# Patient Record
Sex: MALE | Race: WHITE
[De-identification: names, ages, dates, MRNs, and addresses within clinical notes are randomized per-mention and may not be internally consistent; named-entity substitution may affect disease eponyms.]

---

## 2017-04-27 ENCOUNTER — HOSPITAL ENCOUNTER (OUTPATIENT)
Dept: HOSPITAL 61 - ECHO | Age: 82
Discharge: HOME | End: 2017-04-27
Attending: INTERNAL MEDICINE
Payer: MEDICARE

## 2017-04-27 DIAGNOSIS — I08.3: Primary | ICD-10-CM

## 2017-04-27 PROCEDURE — 93306 TTE W/DOPPLER COMPLETE: CPT

## 2017-04-27 NOTE — CARD
--------------- APPROVED REPORT --------------





EXAM: Two-dimensional and M-mode echocardiogram with Doppler and color Doppler.



Other Information 

Quality : Average

Rhythm : Pacemaker



INDICATION

Dyspnea 

Fatigue 

Cardiomyopathy 



2D DIMENSIONS 

RVDd2.6 (2.9-3.5cm)Left Atrium(2D)3.1 (1.6-4.0cm)

IVSd1.2 (0.7-1.1cm)Aortic Root(2D)3.4 (2.0-3.7cm)

LVDd5.4 (3.9-5.9cm)LVOT Diameter2.1 (1.8-2.4cm)

PWd1.2 (0.7-1.1cm)LVDs4.9 (2.5-4.0cm)

SV28.5 mlLVEF(%)38.0 (>50%)



Aortic Valve

AoV Peak Karsten.98.6cm/sAoV VTI21.0cm

AO Peak GR.3.9mmHgLVOT Peak Karsten.76.4cm/s

AO Mean GR.2mmHgAVA (VMAX)2.63cm2

AI P 1/2 [id number]



Mitral Valve

MV E Oafbiari52.7cm/sMV DECEL [id number]

MV A Tnoxcsdr99.6cm/sMV [id number]

E/A  Ratio1.1MV A [id number]

MVA (PHT)4.36cm2



Tricuspid Valve

TR P. Sipjvwar417qd/sRAP [id number]

TR Peak Gr.37ceIjYQAM15ztAn



 LEFT VENTRICLE 

The left ventricle is normal size. There is borderline concentric left ventricular hypertrophy. Left 
ventricle systolic function is moderately impaired. The Ejection Fraction is 38%. There is global hyp
okinesis of the left ventricle more moderate in the basal inferior wall. The left ventricular diastol
ic function and filling is normal for age.



 RIGHT VENTRICLE 

The right ventricle is normal size. The right ventricular systolic function is normal. There is a pac
emaker/ICD lead in the RV/RA.



 ATRIA 

The left atrium size is normal. The right atrium size is normal. The interatrial septum is intact wit
h no evidence for an atrial septal defect or patent foramen ovale as noted on 2-D or Doppler imaging.




 AORTIC VALVE 

The aortic valve is calcified but opens well. The aortic valve is trileaflet. Doppler and Color Flow 
revealed mild aortic regurgitation. There is no significant aortic valvular stenosis.



 MITRAL VALVE 

Mitral annular calcification is mild to moderate. There is no mitral valve stenosis. Doppler and [place]
r Flow revealed mild mitral regurgitation.



 TRICUSPID VALVE 

The tricuspid valve is normal in structure Doppler and Color Flow revealed mild tricuspid regurgitati
on. The PA pressure was estimated at 26 mmHg. There is no tricuspid valve stenosis.



 PULMONIC VALVE 

The pulmonic valve is not well visualized. Doppler and Color Flow revealed mild pulmonic valvular reg
urgitation. There is no pulmonic valvular stenosis.



 GREAT VESSELS 

The aortic root is normal in size. The IVC is normal in size and collapses >50% with inspiration.



 PERICARDIAL EFFUSION 

There is no evidence of significant pericardial effusion.



Critical Notification

Critical Value: No



<Conclusion>

Left ventricle systolic function is moderately impaired.

The Ejection Fraction is 38%.

There is global hypokinesis of the left ventricle more moderate in the basal inferior wall.

There is borderline concentric left ventricular hypertrophy.

The left atrium size is normal.

The right atrium size is normal.

The aortic valve is calcified but opens well.

The aortic valve is trileaflet.

Doppler and Color Flow revealed  mild aortic regurgitation.

Mitral annular calcification is mild to moderate.

Doppler and Color Flow revealed mild mitral regurgitation.

Doppler and Color Flow revealed mild tricuspid regurgitation.

The PA pressure was estimated at 26 mmHg.

Doppler and Color Flow revealed mild pulmonic valvular regurgitation.

There is no evidence of significant pericardial effusion.

## 2017-10-27 ENCOUNTER — HOSPITAL ENCOUNTER (OUTPATIENT)
Dept: HOSPITAL 61 - ECHO | Age: 82
Discharge: HOME | End: 2017-10-27
Attending: INTERNAL MEDICINE
Payer: MEDICARE

## 2017-10-27 DIAGNOSIS — I42.9: ICD-10-CM

## 2017-10-27 DIAGNOSIS — I25.5: ICD-10-CM

## 2017-10-27 DIAGNOSIS — R01.1: ICD-10-CM

## 2017-10-27 DIAGNOSIS — I08.3: Primary | ICD-10-CM

## 2017-10-27 PROCEDURE — 93306 TTE W/DOPPLER COMPLETE: CPT

## 2017-10-27 NOTE — CARD
--------------- APPROVED REPORT --------------





EXAM: Two-dimensional and M-mode echocardiogram with Doppler and color Doppler.



Other Information 

Quality : Good



INDICATION

Cardiomyopathy 

Murmur 



2D DIMENSIONS 

Left Atrium(2D)3.2 (1.6-4.0cm)IVSd1.3 (0.7-1.1cm)

Aortic Root(2D)4.0 (2.0-3.7cm)LVDd4.5 (3.9-5.9cm)

LVOT Diameter2.2 (1.8-2.4cm)PWd1.2 (0.7-1.1cm)

LVDs3.1 (2.5-4.0cm)FS (%) 30.3 %

SV54.0 mlLVEF(%)58.0 (>50%)



Aortic Valve

AoV Peak Kartsen.112.9cm/sAoV VTI24.9cm

AO Peak GR.5.1mmHgLVOT Peak Karsten.64.1cm/s

AO Mean GR.3mmHgAVA (VMAX)2.15cm2

JULIO   (VTI)2.53cs6ID P 1/2 [id number]



Mitral Valve

MV E Dmpliuru19.5cm/sMV DECEL [id number]

MV A Zgkhyvxp69.9cm/sE/A  Ratio0.5



Tricuspid Valve

TR P. Qhjivijg019jg/sRAP [id number]

TR Peak Gr.12ogGwJJTB36xcJw



 LEFT VENTRICLE 

The left ventricle is normal size. There is mild concentric left ventricular hypertrophy. Left ventri
may systolic function is low normal. The estimated Ejection Fraction is 58%. There is mild to moderat
e hypokinesis in the mid septal wall. Septal motion consistent with post-operative state. Transmitral
 Doppler flow pattern is Grade I-abnormal relaxation pattern. 



 RIGHT VENTRICLE 

The right ventricle is normal size. The right ventricular systolic function is normal. Defibulator wi
re seen in right ventricle and right atria.



 ATRIA 

The left atrium size is normal. The right atrium size is normal. The interatrial septum is intact wit
h no evidence for an atrial septal defect or patent foramen ovale as noted on 2-D or Doppler imaging.




 AORTIC VALVE 

The aortic valve is mildly thickened but opens well. Doppler and Color Flow revealed mild aortic regu
rgitation. There is no significant aortic valvular stenosis.



 MITRAL VALVE 

The mitral valve is mildly thickened. There is no evidence of mitral valve prolapse. There is no mitr
al valve stenosis. Doppler and Color-flow revealed mild mitral regurgitation.



 TRICUSPID VALVE 

The tricuspid valve is normal in structure Doppler and Color Flow revealed mild tricuspid regurgitati
on. There is no pulmonary hypertension. The PA pressure was estimated at 27 mmHg. There is no tricusp
id valve stenosis.



 PULMONIC VALVE 

The pulmonic valve is mildly thickened. Doppler and Color Flow revealed mild pulmonic valvular regurg
itation. There is no pulmonic valvular stenosis.



 GREAT VESSELS 

The aortic root appears mildly dilatedl in size. The ascending aorta is normal in size. The IVC is no
rmal in size and collapses >50% with inspiration.



 PERICARDIAL EFFUSION 

There is no pleural effusion. There is no evidence of significant pericardial effusion.



Critical Notification

Critical Value: No



<Conclusion>

There is mild to moderate hypokinesis in the mid septal wall.

Septal motion consistent with post-operative state.

Transmitral Doppler flow pattern is Grade I-abnormal relaxation pattern.

Defibulator wire seen in right ventricle and right atria.

The left atrium size is normal.

The right atrium size is normal.

The aortic valve is mildly thickened but opens well.

Doppler and Color Flow revealed mild aortic regurgitation.

Doppler and Color-flow revealed mild mitral regurgitation.

Doppler and Color Flow revealed mild tricuspid regurgitation.

There is no pulmonary hypertension.

The PA pressure was estimated at 27 mmHg.

Doppler and Color Flow revealed mild pulmonic valvular regurgitation.

The aortic root appears mildly dilatedl in size.

There is no evidence of significant pericardial effusion.

## 2018-05-24 ENCOUNTER — HOSPITAL ENCOUNTER (OUTPATIENT)
Dept: HOSPITAL 61 - CCL | Age: 83
Discharge: HOME | End: 2018-05-24
Attending: INTERNAL MEDICINE
Payer: MEDICARE

## 2018-05-24 DIAGNOSIS — Z88.1: ICD-10-CM

## 2018-05-24 DIAGNOSIS — Z79.899: ICD-10-CM

## 2018-05-24 DIAGNOSIS — F17.200: ICD-10-CM

## 2018-05-24 DIAGNOSIS — E78.00: ICD-10-CM

## 2018-05-24 DIAGNOSIS — I42.9: ICD-10-CM

## 2018-05-24 DIAGNOSIS — Z87.440: ICD-10-CM

## 2018-05-24 DIAGNOSIS — Z98.42: ICD-10-CM

## 2018-05-24 DIAGNOSIS — M19.90: ICD-10-CM

## 2018-05-24 DIAGNOSIS — F41.9: ICD-10-CM

## 2018-05-24 DIAGNOSIS — Z82.49: ICD-10-CM

## 2018-05-24 DIAGNOSIS — I50.9: ICD-10-CM

## 2018-05-24 DIAGNOSIS — Z95.5: ICD-10-CM

## 2018-05-24 DIAGNOSIS — Z83.3: ICD-10-CM

## 2018-05-24 DIAGNOSIS — I08.0: ICD-10-CM

## 2018-05-24 DIAGNOSIS — Z79.84: ICD-10-CM

## 2018-05-24 DIAGNOSIS — I49.5: ICD-10-CM

## 2018-05-24 DIAGNOSIS — Z85.840: ICD-10-CM

## 2018-05-24 DIAGNOSIS — Z45.02: Primary | ICD-10-CM

## 2018-05-24 DIAGNOSIS — Z95.1: ICD-10-CM

## 2018-05-24 DIAGNOSIS — K21.9: ICD-10-CM

## 2018-05-24 DIAGNOSIS — I11.0: ICD-10-CM

## 2018-05-24 DIAGNOSIS — Z98.41: ICD-10-CM

## 2018-05-24 DIAGNOSIS — Z91.011: ICD-10-CM

## 2018-05-24 DIAGNOSIS — E11.9: ICD-10-CM

## 2018-05-24 DIAGNOSIS — I47.2: ICD-10-CM

## 2018-05-24 DIAGNOSIS — I25.10: ICD-10-CM

## 2018-05-24 DIAGNOSIS — Z85.46: ICD-10-CM

## 2018-05-24 DIAGNOSIS — Z95.810: ICD-10-CM

## 2018-05-24 DIAGNOSIS — Z79.82: ICD-10-CM

## 2018-05-24 LAB
ANION GAP SERPL CALC-SCNC: 8 MMOL/L (ref 6–14)
BLOOD UREA NITROGEN: 15 MG/DL (ref 8–26)
CALCIUM: 9.1 MG/DL (ref 8.5–10.1)
CHLORIDE: 109 MMOL/L (ref 98–107)
CO2 SERPL-SCNC: 29 MMOL/L (ref 21–32)
CREAT SERPL-MCNC: 1.4 MG/DL (ref 0.7–1.3)
GFR SERPLBLD BASED ON 1.73 SQ M-ARVRAT: 48.8 ML/MIN
GLUCOSE SERPL-MCNC: 114 MG/DL (ref 70–99)
HCG SERPL-ACNC: 5.4 X10^3/UL (ref 4–11)
HEMATOCRIT: 37.4 % (ref 39–53)
HEMOGLOBIN: 12.7 G/DL (ref 13–17.5)
INR: 1.1 (ref 0.8–1.1)
MEAN CORPUSCULAR HEMOGLOBIN: 30 PG (ref 25–35)
MEAN CORPUSCULAR HGB CONC: 34 G/DL (ref 31–37)
MEAN CORPUSCULAR VOLUME: 87 FL (ref 79–100)
PLATELET COUNT: 146 X10^3/UL (ref 140–400)
POTASSIUM SERPL-SCNC: 4.8 MMOL/L (ref 3.5–5.1)
PROTHROMBIN TIME PATIENT: 13.8 SEC (ref 11.7–14)
RED BLOOD COUNT: 4.28 X10^6/UL (ref 4.3–5.7)
RED CELL DISTRIBUTION WIDTH: 13.3 % (ref 11.5–14.5)
SODIUM: 146 MMOL/L (ref 136–145)

## 2018-05-24 PROCEDURE — 36415 COLL VENOUS BLD VENIPUNCTURE: CPT

## 2018-05-24 PROCEDURE — 99153 MOD SED SAME PHYS/QHP EA: CPT

## 2018-05-24 PROCEDURE — 33263 RMVL & RPLCMT DFB GEN 2 LEAD: CPT

## 2018-05-24 PROCEDURE — 85027 COMPLETE CBC AUTOMATED: CPT

## 2018-05-24 PROCEDURE — 99152 MOD SED SAME PHYS/QHP 5/>YRS: CPT

## 2018-05-24 PROCEDURE — 80048 BASIC METABOLIC PNL TOTAL CA: CPT

## 2018-05-24 PROCEDURE — 85610 PROTHROMBIN TIME: CPT

## 2018-05-24 RX ADMIN — CEFAZOLIN 1 GM: 330 INJECTION, POWDER, FOR SOLUTION INTRAMUSCULAR; INTRAVENOUS at 13:50

## 2018-05-24 RX ADMIN — LIDOCAINE HYDROCHLORIDE,EPINEPHRINE BITARTRATE 2 ML: 20; .01 INJECTION, SOLUTION INFILTRATION; PERINEURAL at 13:50

## 2018-05-24 RX ADMIN — BACITRACIN 1 MLS/HR: 5000 INJECTION, POWDER, FOR SOLUTION INTRAMUSCULAR at 13:50

## 2018-05-24 RX ADMIN — FENTANYL CITRATE 1 MCG: 50 INJECTION INTRAMUSCULAR; INTRAVENOUS at 13:51

## 2018-05-24 RX ADMIN — MIDAZOLAM HYDROCHLORIDE 1 MG: 1 INJECTION, SOLUTION INTRAMUSCULAR; INTRAVENOUS at 13:51

## 2018-07-02 ENCOUNTER — HOSPITAL ENCOUNTER (OUTPATIENT)
Dept: HOSPITAL 61 - CT | Age: 83
Discharge: HOME | End: 2018-07-02
Attending: INTERNAL MEDICINE
Payer: MEDICARE

## 2018-07-02 DIAGNOSIS — I71.4: Primary | ICD-10-CM

## 2018-07-02 DIAGNOSIS — K57.30: ICD-10-CM

## 2018-07-02 DIAGNOSIS — I50.9: ICD-10-CM

## 2018-07-02 DIAGNOSIS — E11.9: ICD-10-CM

## 2018-07-02 DIAGNOSIS — K86.2: ICD-10-CM

## 2018-07-02 DIAGNOSIS — I11.0: ICD-10-CM

## 2018-07-02 DIAGNOSIS — E78.5: ICD-10-CM

## 2018-07-02 PROCEDURE — 74177 CT ABD & PELVIS W/CONTRAST: CPT

## 2018-07-02 RX ADMIN — IOHEXOL 1 ML: 240 INJECTION, SOLUTION INTRATHECAL; INTRAVASCULAR; INTRAVENOUS; ORAL at 08:30

## 2018-07-02 RX ADMIN — IOHEXOL 1 ML: 300 INJECTION, SOLUTION INTRAVENOUS at 09:15

## 2018-08-11 ENCOUNTER — HOSPITAL ENCOUNTER (EMERGENCY)
Dept: HOSPITAL 61 - ER | Age: 83
Discharge: HOME | End: 2018-08-11
Payer: MEDICARE

## 2018-08-11 VITALS — SYSTOLIC BLOOD PRESSURE: 120 MMHG | DIASTOLIC BLOOD PRESSURE: 81 MMHG

## 2018-08-11 VITALS — HEIGHT: 66 IN | BODY MASS INDEX: 18.8 KG/M2 | WEIGHT: 117 LBS

## 2018-08-11 DIAGNOSIS — Z88.0: ICD-10-CM

## 2018-08-11 DIAGNOSIS — R21: Primary | ICD-10-CM

## 2018-08-11 DIAGNOSIS — E11.9: ICD-10-CM

## 2018-08-11 DIAGNOSIS — Z95.0: ICD-10-CM

## 2018-08-11 DIAGNOSIS — I11.9: ICD-10-CM

## 2018-08-11 PROCEDURE — 99283 EMERGENCY DEPT VISIT LOW MDM: CPT

## 2018-08-11 NOTE — PHYS DOC
Past Medical History


Past Medical History:  Diabetes-Type II, Heart Disease, Hypertension


Additional Past Medical Histor:  Eye Cancer, Prostate Cancer


Past Surgical History:  Pacemaker


Additional Past Surgical Histo:  Prostatectomy, Left Eye Surgery, Defib 

Placement


Alcohol Use:  None


Drug Use:  None





Adult General


Chief Complaint


Chief Complaint:  INSECT BITE





HPI


HPI





Patient is an 82  year old male with a history of diabetes whom presents to the 

ED complaining redness to left lower leg x 1 day ago. States a circular red 

rash appeared one day ago. States he is unsure if he was bit by something. 

States it doesnt hurt and isnt warm. States [place] 

[place] and may have been bitten by a tick. Thinks that the rash is close in 

appearance to the Lyme disease rash. States he never picked a tick off of him. 

Denies pain, nausea/vomiting, neck pain, photophobia, fever, night sweats, 

chest pain, shortness of breath, abdominal pain, joint pain, swollen lymph nodes

, or weakness.





Review of Systems


Review of Systems





Constitutional: Denies fever or chills []


Eyes: Denies change in visual acuity, redness, or eye pain []


HENT: Denies nasal congestion or sore throat []


Respiratory: Denies cough or shortness of breath []


Cardiovascular: No additional information not addressed in HPI []


GI: Denies abdominal pain, nausea, vomiting, bloody stools or diarrhea []


: Denies dysuria or hematuria []


Musculoskeletal: Denies back pain or joint pain []


Integument: Complains of rash to left lower leg. Denies skin lesions []


Neurologic: Denies headache, focal weakness or sensory changes []





All other systems were reviewed and found to be within normal limits, except as 

documented in this note.





Allergies


Allergies





Allergies








Coded Allergies Type Severity Reaction Last Updated Verified


 


  Sulfa (Sulfonamide Antibiotics) Allergy Unknown  7/21/14 Yes











Physical Exam


Physical Exam





Constitutional: Well developed, well nourished, no acute distress, non-toxic 

appearance. []


HENT: Normocephalic, atraumatic


Eyes: PERRLA, EOMI, conjunctiva normal, no discharge. [] 


Neck: Normal range of motion, no tenderness, supple, no stridor. [] 


Cardiovascular:Heart rate regular rhythm, no murmur []


Lungs & Thorax:  Bilateral breath sounds clear to auscultation []


Abdomen: Bowel sounds normal, soft, no tenderness, no masses, no pulsatile 

masses. [] 


Skin: Warm, dry. erythematous annular rash with central clearing to left 

proximal tibia area.


Back: No tenderness, no CVA tenderness. [] 


Extremities: No tenderness, no cyanosis, no clubbing, ROM intact, no edema. [] 


Neurologic: Alert and oriented X 3, normal motor function, normal sensory 

function, no focal deficits noted. []


Psychologic: Affect normal, judgement normal, mood normal. []





Current Patient Data


Vital Signs





 Vital Signs








  Date Time  Temp Pulse Resp B/P (MAP) Pulse Ox O2 Delivery O2 Flow Rate FiO2


 


8/11/18 12:55 98.5 60 20 120/81 (94) 97 Room Air  





 98.5       











EKG


EKG


[]





Radiology/Procedures


Radiology/Procedures


[]





Course & Med Decision Making


Course & Med Decision Making


Pertinent Labs and Imaging studies reviewed. (See chart for details)





[]Erythematous annular like rash with central clearing which would possibly be 

related to a tick bite. Patient has no systemic symptoms or swollen lymph 

nodes.. We'll place patient on doxycycline. Patient has follow-up with his PCP 

tomorrow for blood testing and further evaluation. Discussed symptomatic 

treatment in the meantime. Discussed follow-up and reasons to return to the ED. 

Patient understands and agrees with plan. Went bedside.





Dragon Disclaimer


Dragon Disclaimer


This electronic medical record was generated, in whole or in part, using a 

voice recognition dictation system.





Departure


Departure


Impression:  


 Primary Impression:  


 Rash


Disposition:  01 HOME, SELF-CARE


Condition:  STABLE


Referrals:  


DOLORES CALLEJAS MD (PCP)


Patient Instructions:  Lyme Disease


Scripts


Doxycycline Hyclate (DOXYCYCLINE HYCLATE) 100 Mg Tablet.


1 TAB PO BID for 10 Days, #20 TAB


   Prov: XAVIER CRONIN         8/11/18











XAVIER CRONIN Aug 11, 2018 13:12

## 2018-10-22 ENCOUNTER — HOSPITAL ENCOUNTER (INPATIENT)
Dept: HOSPITAL 61 - ER | Age: 83
LOS: 4 days | Discharge: HOME HEALTH | DRG: 309 | End: 2018-10-26
Attending: FAMILY MEDICINE | Admitting: FAMILY MEDICINE
Payer: MEDICARE

## 2018-10-22 VITALS — WEIGHT: 115.06 LBS | HEIGHT: 66 IN | BODY MASS INDEX: 18.49 KG/M2

## 2018-10-22 VITALS — DIASTOLIC BLOOD PRESSURE: 49 MMHG | SYSTOLIC BLOOD PRESSURE: 174 MMHG

## 2018-10-22 VITALS — SYSTOLIC BLOOD PRESSURE: 141 MMHG | DIASTOLIC BLOOD PRESSURE: 63 MMHG

## 2018-10-22 DIAGNOSIS — I47.2: Primary | ICD-10-CM

## 2018-10-22 DIAGNOSIS — Z82.49: ICD-10-CM

## 2018-10-22 DIAGNOSIS — E44.1: ICD-10-CM

## 2018-10-22 DIAGNOSIS — I49.5: ICD-10-CM

## 2018-10-22 DIAGNOSIS — M19.90: ICD-10-CM

## 2018-10-22 DIAGNOSIS — Z95.1: ICD-10-CM

## 2018-10-22 DIAGNOSIS — I25.5: ICD-10-CM

## 2018-10-22 DIAGNOSIS — Z95.810: ICD-10-CM

## 2018-10-22 DIAGNOSIS — E83.42: ICD-10-CM

## 2018-10-22 DIAGNOSIS — Z86.79: ICD-10-CM

## 2018-10-22 DIAGNOSIS — I12.9: ICD-10-CM

## 2018-10-22 DIAGNOSIS — I25.10: ICD-10-CM

## 2018-10-22 DIAGNOSIS — Z95.5: ICD-10-CM

## 2018-10-22 DIAGNOSIS — Z83.3: ICD-10-CM

## 2018-10-22 DIAGNOSIS — Z85.46: ICD-10-CM

## 2018-10-22 DIAGNOSIS — Z85.840: ICD-10-CM

## 2018-10-22 DIAGNOSIS — I48.91: ICD-10-CM

## 2018-10-22 DIAGNOSIS — K21.9: ICD-10-CM

## 2018-10-22 DIAGNOSIS — N18.9: ICD-10-CM

## 2018-10-22 DIAGNOSIS — E11.22: ICD-10-CM

## 2018-10-22 DIAGNOSIS — Z91.011: ICD-10-CM

## 2018-10-22 LAB
ALBUMIN SERPL-MCNC: 3.1 G/DL (ref 3.4–5)
ALBUMIN/GLOB SERPL: 0.9 {RATIO} (ref 1–1.7)
ALP SERPL-CCNC: 39 U/L (ref 46–116)
ALT SERPL-CCNC: 21 U/L (ref 16–63)
ANION GAP SERPL CALC-SCNC: 11 MMOL/L (ref 6–14)
AST SERPL-CCNC: 19 U/L (ref 15–37)
BASOPHILS # BLD AUTO: 0 X10^3/UL (ref 0–0.2)
BASOPHILS NFR BLD: 1 % (ref 0–3)
BILIRUB SERPL-MCNC: 0.3 MG/DL (ref 0.2–1)
BUN SERPL-MCNC: 24 MG/DL (ref 8–26)
BUN/CREAT SERPL: 18 (ref 6–20)
CALCIUM SERPL-MCNC: 9.2 MG/DL (ref 8.5–10.1)
CHLORIDE SERPL-SCNC: 104 MMOL/L (ref 98–107)
CK SERPL-CCNC: 28 U/L (ref 39–308)
CO2 SERPL-SCNC: 25 MMOL/L (ref 21–32)
CREAT SERPL-MCNC: 1.3 MG/DL (ref 0.7–1.3)
EOSINOPHIL NFR BLD: 0.2 X10^3/UL (ref 0–0.7)
EOSINOPHIL NFR BLD: 4 % (ref 0–3)
ERYTHROCYTE [DISTWIDTH] IN BLOOD BY AUTOMATED COUNT: 14 % (ref 11.5–14.5)
GFR SERPLBLD BASED ON 1.73 SQ M-ARVRAT: 53 ML/MIN
GLOBULIN SER-MCNC: 3.4 G/DL (ref 2.2–3.8)
GLUCOSE SERPL-MCNC: 114 MG/DL (ref 70–99)
HCT VFR BLD CALC: 31.9 % (ref 39–53)
HGB BLD-MCNC: 10.7 G/DL (ref 13–17.5)
LYMPHOCYTES # BLD: 0.7 X10^3/UL (ref 1–4.8)
LYMPHOCYTES NFR BLD AUTO: 11 % (ref 24–48)
MAGNESIUM SERPL-MCNC: 1.6 MG/DL (ref 1.8–2.4)
MCH RBC QN AUTO: 29 PG (ref 25–35)
MCHC RBC AUTO-ENTMCNC: 34 G/DL (ref 31–37)
MCV RBC AUTO: 87 FL (ref 79–100)
MONO #: 0.5 X10^3/UL (ref 0–1.1)
MONOCYTES NFR BLD: 8 % (ref 0–9)
NEUT #: 4.6 X10^3UL (ref 1.8–7.7)
NEUTROPHILS NFR BLD AUTO: 77 % (ref 31–73)
PLATELET # BLD AUTO: 198 X10^3/UL (ref 140–400)
POTASSIUM SERPL-SCNC: 4.4 MMOL/L (ref 3.5–5.1)
PROT SERPL-MCNC: 6.5 G/DL (ref 6.4–8.2)
RBC # BLD AUTO: 3.68 X10^6/UL (ref 4.3–5.7)
SODIUM SERPL-SCNC: 140 MMOL/L (ref 136–145)
WBC # BLD AUTO: 6 X10^3/UL (ref 4–11)

## 2018-10-22 PROCEDURE — 84484 ASSAY OF TROPONIN QUANT: CPT

## 2018-10-22 PROCEDURE — 36415 COLL VENOUS BLD VENIPUNCTURE: CPT

## 2018-10-22 PROCEDURE — 85025 COMPLETE CBC W/AUTO DIFF WBC: CPT

## 2018-10-22 PROCEDURE — 82962 GLUCOSE BLOOD TEST: CPT

## 2018-10-22 PROCEDURE — 93005 ELECTROCARDIOGRAM TRACING: CPT

## 2018-10-22 PROCEDURE — 4B02XTZ MEASUREMENT OF CARDIAC DEFIBRILLATOR, EXTERNAL APPROACH: ICD-10-PCS | Performed by: FAMILY MEDICINE

## 2018-10-22 PROCEDURE — 83735 ASSAY OF MAGNESIUM: CPT

## 2018-10-22 PROCEDURE — 80053 COMPREHEN METABOLIC PANEL: CPT

## 2018-10-22 PROCEDURE — 82553 CREATINE MB FRACTION: CPT

## 2018-10-22 PROCEDURE — 96365 THER/PROPH/DIAG IV INF INIT: CPT

## 2018-10-22 PROCEDURE — 80048 BASIC METABOLIC PNL TOTAL CA: CPT

## 2018-10-22 PROCEDURE — 71045 X-RAY EXAM CHEST 1 VIEW: CPT

## 2018-10-22 PROCEDURE — 83880 ASSAY OF NATRIURETIC PEPTIDE: CPT

## 2018-10-22 RX ADMIN — PROPAFENONE HYDROCHLORIDE SCH MG: 150 TABLET, FILM COATED ORAL at 20:50

## 2018-10-22 RX ADMIN — ACETAMINOPHEN SCH MG: 500 TABLET ORAL at 20:50

## 2018-10-22 RX ADMIN — ATORVASTATIN CALCIUM SCH MG: 20 TABLET, FILM COATED ORAL at 20:51

## 2018-10-22 NOTE — PHYS DOC
Past Medical History


Past Medical History:  Diabetes-Type II, Heart Disease, Hypertension


Additional Past Medical Histor:  Eye Cancer, Prostate Cancer


Past Surgical History:  Angioplasty, Coronary Bypass Surgery, Pacemaker


Additional Past Surgical Histo:  Prostatectomy, Left Eye Surgery, Defib 

Placement, CARDIAC STENTS


Alcohol Use:  Occasionally


Drug Use:  None





Adult General


Chief Complaint


Chief Complaint:  AICD FIRED OR SHOCKED





HPI


HPI


Patient is an 83-year-old male with a past history of coronary artery disease, 

CABG, history of ventricular tachycardia, status post and ICD placement, who 

presents to the emergency department for evaluation. He states he had just 

finished doing some yard work and was winding up and rolling up his garden hose

, when he began experiencing some dizziness and lightheadedness. He states he 

did not have any chest pain at the time but this lasted several minutes, and he 

states he went inside. He states he sat down and felt slightly better although 

he still felt dizzy, and throughout this entire episode he felt his heart 

beating fast. He states without warning he felt the defibrillator shock, and 

after that his symptoms resolved. He is feeling back to his baseline now and 

has no complaints. He has not had any nausea or vomiting, denies any 

significant shortness of breath. He states he has had defibrillator discharges 

in the past, last one was over a year ago. There are no alleviating, or 

exacerbating factors to his symptoms otherwise.





Review of Systems


Review of Systems





Constitutional: Denies fever or chills []


Eyes: Denies change in visual acuity, redness, or eye pain []


HENT: Denies nasal congestion or sore throat []


Respiratory: Denies cough or shortness of breath []


Cardiovascular: The patient denies any shortness of breath, chest pain, 

palpitations, or orthopnea. Did report palpitations earlier, terminated by the 

defibrillator discharge. []


GI: Denies abdominal pain, nausea, vomiting, bloody stools or diarrhea []


: Denies dysuria or hematuria []


Musculoskeletal: Denies back pain or joint pain []


Integument: Denies rash or skin lesions []


Neurologic: Denies headache, focal weakness or sensory changes []


Endocrine: Denies polyuria or polydipsia []





All other systems were reviewed and found to be within normal limits, except as 

documented in this note.





Current Medications


Current Medications





Current Medications








 Medications


  (Trade)  Dose


 Ordered  Sig/Pedro Pablo  Start Time


 Stop Time Status Last Admin


Dose Admin


 


 Aspirin


  ([facility]


 Aspirin)  324 mg  1X  ONCE  10/22/18 15:15


 10/22/18 15:16 DC 10/22/18 15:14


324 MG











Allergies


Allergies





Allergies








Coded Allergies Type Severity Reaction Last Updated Verified


 


  Sulfa (Sulfonamide Antibiotics) Allergy Unknown  7/21/14 Yes











Physical Exam


Physical Exam


PHYSICAL EXAM:





CONSTITUTIONAL: Well developed, well nourished


HEAD: normocephalic, atraumatic


EENT: PERRL, EOMI. Conjunctivae normal color, sclerae non-icteric; moist mucous 

membranes.


NECK: Supple, non-tender; no meningismus.


LUNGS: Lungs CTA, breathing even and unlabored. Normal air movement.


HEART: Regular rate and rhythm, there is a soft systolic murmur


CHEST: No deformity; non-tender


ABDOMEN: The abdomen is soft, and non-tender, no masses or bruits.


EXTREM: Normal ROM; no deformity, no calf tenderness. Normal pulses palpable in 

all extremities. There is no pedal edema.


SKIN: No rash; no diaphoresis


NEURO: Alert; normal speech and cognition; CN's grossly intact; strength 

grossly intact without focal deficit.


BACK: No CVA TTP.





Current Patient Data


Vital Signs





 Vital Signs








  Date Time  Temp Pulse Resp B/P (MAP) Pulse Ox O2 Delivery O2 Flow Rate FiO2


 


10/22/18 14:25 97.3 57 18 148/64 (92) 98 Room Air  





 97.3       








Lab Values





 Laboratory Tests








Test


 10/22/18


15:37


 


White Blood Count


 6.0 x10^3/uL


(4.0-11.0)


 


Red Blood Count


 3.68 x10^6/uL


(4.30-5.70)  L


 


Hemoglobin


 10.7 g/dL


(13.0-17.5)  L


 


Hematocrit


 31.9 %


(39.0-53.0)  L


 


Mean Corpuscular Volume


 87 fL ()





 


Mean Corpuscular Hemoglobin 29 pg (25-35)  


 


Mean Corpuscular Hemoglobin


Concent 34 g/dL


(31-37)


 


Red Cell Distribution Width


 14.0 %


(11.5-14.5)


 


Platelet Count


 198 x10^3/uL


(140-400)


 


Neutrophils (%) (Auto) 77 % (31-73)  H


 


Lymphocytes (%) (Auto) 11 % (24-48)  L


 


Monocytes (%) (Auto) 8 % (0-9)  


 


Eosinophils (%) (Auto) 4 % (0-3)  H


 


Basophils (%) (Auto) 1 % (0-3)  


 


Neutrophils # (Auto)


 4.6 x10^3uL


(1.8-7.7)


 


Lymphocytes # (Auto)


 0.7 x10^3/uL


(1.0-4.8)  L


 


Monocytes # (Auto)


 0.5 x10^3/uL


(0.0-1.1)


 


Eosinophils # (Auto)


 0.2 x10^3/uL


(0.0-0.7)


 


Basophils # (Auto)


 0.0 x10^3/uL


(0.0-0.2)


 


Sodium Level


 140 mmol/L


(136-145)


 


Potassium Level


 4.4 mmol/L


(3.5-5.1)


 


Chloride Level


 104 mmol/L


()


 


Carbon Dioxide Level


 25 mmol/L


(21-32)


 


Anion Gap 11 (6-14)  


 


Blood Urea Nitrogen


 24 mg/dL


(8-26)


 


Creatinine


 1.3 mg/dL


(0.7-1.3)


 


Estimated GFR


(Cockcroft-Gault) 53.0  





 


BUN/Creatinine Ratio 18 (6-20)  


 


Glucose Level


 114 mg/dL


(70-99)  H


 


Calcium Level


 9.2 mg/dL


(8.5-10.1)


 


Magnesium Level


 1.6 mg/dL


(1.8-2.4)  L


 


Total Bilirubin


 0.3 mg/dL


(0.2-1.0)


 


Aspartate Amino Transferase


(AST) 19 U/L (15-37)





 


Alanine Aminotransferase (ALT)


 21 U/L (16-63)





 


Alkaline Phosphatase


 39 U/L


()  L


 


Troponin I Quantitative


 0.052 ng/mL


(0.000-0.055)


 


Total Protein


 6.5 g/dL


(6.4-8.2)


 


Albumin


 3.1 g/dL


(3.4-5.0)  L


 


Albumin/Globulin Ratio


 0.9 (1.0-1.7)


L





 Laboratory Tests


10/22/18 15:37








 Laboratory Tests


10/22/18 15:37











EKG


EKG


[Normal sinus rhythm at a rate of 57 beats for minute, right axis deviation, 

right bundle-branch block, inferior Q waves are present. There are no acute 

ischemic ST/T changes.]





Radiology/Procedures


Radiology/Procedures


[PROCEDURE: PORTABLE CHEST 1V





Portable chest, 10/22/2018:


 


HISTORY: Dizziness


 


There has been a previous median sternotomy. A left-sided AICD remains in 


place with a single lead extending into the right ventricle. Faint 


wire-like opacities overlying the heart are compatible with old epicardial


leads. The heart size and pulmonary vascularity are normal. There is 


calcific plaquing of the aorta. No pulmonary infiltrate is seen. There is 


no evidence of pleural fluid.


 


IMPRESSION: No acute cardiopulmonary abnormality is detected.]





Course & Med Decision Making


Course & Med Decision Making


Pertinent Labs and Imaging studies reviewed. (See chart for details)





[4:15 PM: The patient's condition remained stable. He has had no further 

symptoms. Interrogation of his defibrillator confirm that between about 1:33 

and 1:39 PM this afternoon, he had 6 separate episodes of ventricular 

tachycardia. One episode self terminated, and 5 episodes were terminated with 

antitachycardia pacing after less than 20 seconds. The final episode went on 

for just under 1 minute, and required a defibrillatory shock after failure of 

antitachycardia pacing. I discussed the patient with Dr. Zhong, his 

cardiologist, who states that he has been adjusting the patient's 

antiarrhythmic medications, and would like him hospitalized for further 

evaluation and treatment. I spoke with Dr. Hou, covering for the patient's PCP

, who will admit the patient.]





Dragon Disclaimer


Dragon Disclaimer


This electronic medical record was generated, in whole or in part, using a 

voice recognition dictation system.





Departure


Departure


Impression:  


 Primary Impression:  


 Paroxysmal ventricular tachycardia


 Additional Impressions:  


 Hypomagnesemia


 Defibrillator discharge


Disposition:  09 ADMITTED AS INPATIENT


Admitting Physician:  MALI Hou


Condition:  STABLE


Referrals:  


DOLORES CALLEJAS MD (PCP)





Problem Qualifiers











CARLOS STANTON MD Oct 22, 2018 15:10

## 2018-10-22 NOTE — EKG
[facility]

              [address] [place] [address]

Test Date:    2018-10-22               Test Time:    14:28:40

Pat Name:     DORIAN WILSON           Department:   

Patient ID:   [id number]           Room:          

Gender:       M                        Technician:   

:          [id number]               Requested By: CARLOS STANTON

Order Number: [id number]           Reading MD:   Ryne Olivares MD

                                 Measurements

Intervals                              Axis          

Rate:         57                       P:            -90

KY:           174                      QRS:          126

QRSD:         164                      T:            3

QT:           462                                    

QTc:          453                                    

                           Interpretive Statements

SINUS RHYTHM

RBBB

LPFB

Electronically Signed On 10- 9:42:03 CDT by Ryne Olivares MD

## 2018-10-22 NOTE — RAD
Portable chest, 10/22/2018:

 

HISTORY: Dizziness

 

There has been a previous median sternotomy. A left-sided AICD remains in 

place with a single lead extending into the right ventricle. Faint 

wire-like opacities overlying the heart are compatible with old epicardial

leads. The heart size and pulmonary vascularity are normal. There is 

calcific plaquing of the aorta. No pulmonary infiltrate is seen. There is 

no evidence of pleural fluid.

 

IMPRESSION: No acute cardiopulmonary abnormality is detected.

 

Electronically signed by: Rick Moritz, MD (10/22/2018 4:03 PM) [facility]

## 2018-10-23 VITALS — SYSTOLIC BLOOD PRESSURE: 117 MMHG | DIASTOLIC BLOOD PRESSURE: 62 MMHG

## 2018-10-23 VITALS — SYSTOLIC BLOOD PRESSURE: 105 MMHG | DIASTOLIC BLOOD PRESSURE: 49 MMHG

## 2018-10-23 VITALS — DIASTOLIC BLOOD PRESSURE: 51 MMHG | SYSTOLIC BLOOD PRESSURE: 119 MMHG

## 2018-10-23 VITALS — SYSTOLIC BLOOD PRESSURE: 123 MMHG | DIASTOLIC BLOOD PRESSURE: 47 MMHG

## 2018-10-23 VITALS — SYSTOLIC BLOOD PRESSURE: 130 MMHG | DIASTOLIC BLOOD PRESSURE: 55 MMHG

## 2018-10-23 VITALS — DIASTOLIC BLOOD PRESSURE: 56 MMHG | SYSTOLIC BLOOD PRESSURE: 122 MMHG

## 2018-10-23 RX ADMIN — ATORVASTATIN CALCIUM SCH MG: 20 TABLET, FILM COATED ORAL at 16:58

## 2018-10-23 RX ADMIN — FENOFIBRATE SCH MG: 134 CAPSULE ORAL at 09:02

## 2018-10-23 RX ADMIN — ENOXAPARIN SODIUM SCH MG: 100 INJECTION SUBCUTANEOUS at 16:59

## 2018-10-23 RX ADMIN — PROPAFENONE HYDROCHLORIDE SCH MG: 150 TABLET, FILM COATED ORAL at 20:47

## 2018-10-23 RX ADMIN — ACETAMINOPHEN SCH MG: 500 TABLET ORAL at 20:46

## 2018-10-23 RX ADMIN — PROPAFENONE HYDROCHLORIDE SCH MG: 150 TABLET, FILM COATED ORAL at 09:02

## 2018-10-23 RX ADMIN — ASPIRIN SCH MG: 325 TABLET, DELAYED RELEASE ORAL at 16:58

## 2018-10-23 RX ADMIN — MULTIPLE VITAMINS W/ MINERALS TAB SCH TAB: TAB at 09:02

## 2018-10-23 RX ADMIN — MAGNESIUM OXIDE TAB 400 MG (241.3 MG ELEMENTAL MG) SCH MG: 400 (241.3 MG) TAB at 09:02

## 2018-10-23 NOTE — PDOC2
CONSULT


Date of Consult


Date of Consult


DATE: 10/23/18 


TIME: 13:44





Reason for Consult


Reason for Consult:


Cardiac dysrhythmias and AICD firing





Referring Physician


Referring Physician:


Dr Hou





Identification/Chief Complaint


Chief Complaint


AICD firing





Source


Source:  Patient





History of Present Illness


Reason for Visit:


Patient is a pleasant 83 year old male that presents with chief complaint of 

AICD firing yesterday. Patient developed dizziness and lightheadedness as 

completing yardwork yesterday. He tried sitting down, which did not alleviate 

his symptoms. He then felt his AICD fire, which resolved his symptoms. Patient 

denies any current symptoms.





Past Medical History


Cardiovascular:  AFIB, HTN, Other


Pulmonary:  Bronchitis


GI:  GERD





Current Problem List


Problem List


Problems


Medical Problems:


(1) Defibrillator discharge


Status: Acute  











Current Medications


Current Medications





Current Medications


Aspirin (Children'S Aspirin) 324 mg 1X  ONCE PO  Last administered on 10/22/

18at 15:14;  Start 10/22/18 at 15:15;  Stop 10/22/18 at 15:16;  Status DC


Magnesium Sulfate/ Dextrose 100 ml @  100 mls/hr 1X  ONCE IV  Last administered 

on 10/22/18at 16:47;  Start 10/22/18 at 16:45;  Stop 10/22/18 at 17:44;  Status 

DC


Propafenone HCl (Rythmol) 150 mg BID PO  Last administered on 10/23/18at 09:02;

  Start 10/22/18 at 21:00


Aspirin (Ecotrin) 325 mg DAILY16 PO ;  Start 10/23/18 at 16:00


Atorvastatin Calcium (Lipitor) 20 mg DAILY16 PO  Last administered on 10/22/

18at 20:51;  Start 10/22/18 at 19:30


Acetaminophen (Tylenol) 500 mg QHS PO  Last administered on 10/22/18at 20:50;  

Start 10/22/18 at 21:00


Fenofibrate (Lofibra) 134 mg DAILY PO  Last administered on 10/23/18at 09:02;  

Start 10/23/18 at 09:00


Magnesium Oxide (Magnesium Oxide) 400 mg DAILY PO  Last administered on 10/23/

18at 09:02;  Start 10/23/18 at 09:00


Metformin HCl (Glucophage) 500 mg BIDWMEALS PO  Last administered on 10/23/18at 

09:02;  Start 10/22/18 at 19:30


Multivitamins (Thera M Plus) 1 tab DAILY PO  Last administered on 10/23/18at 09:

02;  Start 10/23/18 at 09:00


Non-Formulary Medication ([melatonin] ) 10 mg QHS PO ;  Start 10/22/18 at 21:00

;  Status UNV


Diphenhydramine HCl (Benadryl) 25 mg QHS PO  Last administered on 10/22/18at 20:

50;  Start 10/22/18 at 21:00





Active Scripts


Active


Reported


Magnesium (Magnesium Oxide) 400 Mg Capsule 250 Mg PO DAILY


[melatonin]   10 Mg PO QHS


Centrum Silver Tablet (Multivits-Min/Fa/Lycopene/Lut) 1 Each Tablet 1 Tab  DAILY


Acetadryl 500-25 Mg Caplet (Acetaminophen/Diphenhydramine) 1 Each Tablet 1 Tab  

HS


Aspir-Vilma (Aspirin) 325 Mg Tablet.dr 1 Tab PO DAILY16


Metformin Hcl 500 Mg Tablet 1 Tab  BIDBFRMEAL


Atorvastatin Calcium 20 Mg Tablet 1 Tab  DAILY16


Fenofibrate 160 Mg Tablet 1 Tab  DAILY





Allergies


Allergies:  


Coded Allergies:  


     Milk Containing Products (Verified  Allergy, Unknown, 10/22/18)





ROS


Cardiovascular:  yes Lt Headedness





Physical Exam


General:  Alert, Oriented X3, Cooperative, No acute distress


Lungs:  Clear to auscultation


Heart:  Normal S1, Normal S2, Other (bradycardic)


Extremities:  No clubbing, No cyanosis, No edema, Normal pulses





Vitals


VITALS





Vital Signs








  Date Time  Temp Pulse Resp B/P (MAP) Pulse Ox O2 Delivery O2 Flow Rate FiO2


 


10/23/18 11:10 97.4 49 16 105/49 (67) 96 Room Air  





 97.4       











Labs


Labs





Laboratory Tests








Test


 10/22/18


15:37 10/22/18


19:25 10/22/18


22:20 10/23/18


07:41


 


White Blood Count


 6.0 x10^3/uL


(4.0-11.0) 


 


 





 


Red Blood Count


 3.68 x10^6/uL


(4.30-5.70) 


 


 





 


Hemoglobin


 10.7 g/dL


(13.0-17.5) 


 


 





 


Hematocrit


 31.9 %


(39.0-53.0) 


 


 





 


Mean Corpuscular Volume 87 fL ()    


 


Mean Corpuscular Hemoglobin 29 pg (25-35)    


 


Mean Corpuscular Hemoglobin


Concent 34 g/dL


(31-37) 


 


 





 


Red Cell Distribution Width


 14.0 %


(11.5-14.5) 


 


 





 


Platelet Count


 198 x10^3/uL


(140-400) 


 


 





 


Neutrophils (%) (Auto) 77 % (31-73)    


 


Lymphocytes (%) (Auto) 11 % (24-48)    


 


Monocytes (%) (Auto) 8 % (0-9)    


 


Eosinophils (%) (Auto) 4 % (0-3)    


 


Basophils (%) (Auto) 1 % (0-3)    


 


Neutrophils # (Auto)


 4.6 x10^3uL


(1.8-7.7) 


 


 





 


Lymphocytes # (Auto)


 0.7 x10^3/uL


(1.0-4.8) 


 


 





 


Monocytes # (Auto)


 0.5 x10^3/uL


(0.0-1.1) 


 


 





 


Eosinophils # (Auto)


 0.2 x10^3/uL


(0.0-0.7) 


 


 





 


Basophils # (Auto)


 0.0 x10^3/uL


(0.0-0.2) 


 


 





 


Sodium Level


 140 mmol/L


(136-145) 


 


 





 


Potassium Level


 4.4 mmol/L


(3.5-5.1) 


 


 





 


Chloride Level


 104 mmol/L


() 


 


 





 


Carbon Dioxide Level


 25 mmol/L


(21-32) 


 


 





 


Anion Gap 11 (6-14)    


 


Blood Urea Nitrogen


 24 mg/dL


(8-26) 


 


 





 


Creatinine


 1.3 mg/dL


(0.7-1.3) 


 


 





 


Estimated GFR


(Cockcroft-Gault) 53.0 


 


 


 





 


BUN/Creatinine Ratio 18 (6-20)    


 


Glucose Level


 114 mg/dL


(70-99) 


 


 





 


Calcium Level


 9.2 mg/dL


(8.5-10.1) 


 


 





 


Magnesium Level


 1.6 mg/dL


(1.8-2.4) 


 


 





 


Total Bilirubin


 0.3 mg/dL


(0.2-1.0) 


 


 





 


Aspartate Amino Transf


(AST/SGOT) 19 U/L (15-37) 


 


 


 





 


Alanine Aminotransferase


(ALT/SGPT) 21 U/L (16-63) 


 


 


 





 


Alkaline Phosphatase


 39 U/L


() 


 


 





 


Creatine Kinase


 28 U/L


() 


 


 





 


Creatine Kinase MB (Mass)


 0.7 ng/mL


(0.0-3.6) 


 


 





 


Creatine Kinase MB Relative


Index  % (0-4) 


 


 


 





 


Troponin I Quantitative


 0.052 ng/mL


(0.000-0.055) 0.148 ng/mL


(0.000-0.055) 0.144 ng/mL


(0.000-0.055) 





 


NT-Pro-B-Type Natriuretic


Peptide 974 pg/mL


(0-449) 


 


 





 


Total Protein


 6.5 g/dL


(6.4-8.2) 


 


 





 


Albumin


 3.1 g/dL


(3.4-5.0) 


 


 





 


Albumin/Globulin Ratio 0.9 (1.0-1.7)    


 


Glucose (Fingerstick)


 


 


 


 74 mg/dL


(70-99)








Laboratory Tests








Test


 10/22/18


15:37 10/22/18


19:25 10/22/18


22:20 10/23/18


07:41


 


White Blood Count


 6.0 x10^3/uL


(4.0-11.0) 


 


 





 


Red Blood Count


 3.68 x10^6/uL


(4.30-5.70) 


 


 





 


Hemoglobin


 10.7 g/dL


(13.0-17.5) 


 


 





 


Hematocrit


 31.9 %


(39.0-53.0) 


 


 





 


Mean Corpuscular Volume 87 fL ()    


 


Mean Corpuscular Hemoglobin 29 pg (25-35)    


 


Mean Corpuscular Hemoglobin


Concent 34 g/dL


(31-37) 


 


 





 


Red Cell Distribution Width


 14.0 %


(11.5-14.5) 


 


 





 


Platelet Count


 198 x10^3/uL


(140-400) 


 


 





 


Neutrophils (%) (Auto) 77 % (31-73)    


 


Lymphocytes (%) (Auto) 11 % (24-48)    


 


Monocytes (%) (Auto) 8 % (0-9)    


 


Eosinophils (%) (Auto) 4 % (0-3)    


 


Basophils (%) (Auto) 1 % (0-3)    


 


Neutrophils # (Auto)


 4.6 x10^3uL


(1.8-7.7) 


 


 





 


Lymphocytes # (Auto)


 0.7 x10^3/uL


(1.0-4.8) 


 


 





 


Monocytes # (Auto)


 0.5 x10^3/uL


(0.0-1.1) 


 


 





 


Eosinophils # (Auto)


 0.2 x10^3/uL


(0.0-0.7) 


 


 





 


Basophils # (Auto)


 0.0 x10^3/uL


(0.0-0.2) 


 


 





 


Sodium Level


 140 mmol/L


(136-145) 


 


 





 


Potassium Level


 4.4 mmol/L


(3.5-5.1) 


 


 





 


Chloride Level


 104 mmol/L


() 


 


 





 


Carbon Dioxide Level


 25 mmol/L


(21-32) 


 


 





 


Anion Gap 11 (6-14)    


 


Blood Urea Nitrogen


 24 mg/dL


(8-26) 


 


 





 


Creatinine


 1.3 mg/dL


(0.7-1.3) 


 


 





 


Estimated GFR


(Cockcroft-Gault) 53.0 


 


 


 





 


BUN/Creatinine Ratio 18 (6-20)    


 


Glucose Level


 114 mg/dL


(70-99) 


 


 





 


Calcium Level


 9.2 mg/dL


(8.5-10.1) 


 


 





 


Magnesium Level


 1.6 mg/dL


(1.8-2.4) 


 


 





 


Total Bilirubin


 0.3 mg/dL


(0.2-1.0) 


 


 





 


Aspartate Amino Transf


(AST/SGOT) 19 U/L (15-37) 


 


 


 





 


Alanine Aminotransferase


(ALT/SGPT) 21 U/L (16-63) 


 


 


 





 


Alkaline Phosphatase


 39 U/L


() 


 


 





 


Creatine Kinase


 28 U/L


() 


 


 





 


Creatine Kinase MB (Mass)


 0.7 ng/mL


(0.0-3.6) 


 


 





 


Creatine Kinase MB Relative


Index  % (0-4) 


 


 


 





 


Troponin I Quantitative


 0.052 ng/mL


(0.000-0.055) 0.148 ng/mL


(0.000-0.055) 0.144 ng/mL


(0.000-0.055) 





 


NT-Pro-B-Type Natriuretic


Peptide 974 pg/mL


(0-449) 


 


 





 


Total Protein


 6.5 g/dL


(6.4-8.2) 


 


 





 


Albumin


 3.1 g/dL


(3.4-5.0) 


 


 





 


Albumin/Globulin Ratio 0.9 (1.0-1.7)    


 


Glucose (Fingerstick)


 


 


 


 74 mg/dL


(70-99)











Assessment/Plan


Assessment/Plan


Patient with an episode of tachycardia and successful shocking from the AICD.





We have been having difficulties in controlling his rhythm and tried different 

medications but when we tried the flecainide he did not tolerate a significant 

dose.





In view of this I would like to start the patient on Rythmol and see how she 

does.





Elevated troponins possibly due to AICD firing





Thank you very much for asking me to participate in the care of this patient











OSWALD COE MD Oct 23, 2018 13:45

## 2018-10-23 NOTE — PDOC1
History and Physical


Date of Admission


Date of Admission


10/22/18





Identification/Chief Complaint


Chief Complaint


AICD fired after rolling up hose at home





Source


Source:  Chart review, Patient





History of Present Illness


History of Present Illness


He got lightheaded and his AICD fired while rolling up hose yesterday, came to 

ER and found to have low Mg and mildly elevated troponin and pro - BNP





Past Medical History


Cardiovascular:  AFIB, CAD, HTN, Other


Pulmonary:  Bronchitis


GI:  GERD


Heme/Onc:  Cancer (melanoma of eye, left)


Hepatobiliary:  No pertinent hx


Psych:  No pertinent hx, Other


Rheumatologic:  Other (OA, spinal stenosis of lumbar spine)


Infectious disease:  No pertinent hx


ENT:  No pertinent hx


Renal/:  Chronic renal insuff, Prostate Ca.


Endocrine:  Diabetes


Dermatology:  No pertinent hx





Past Surgical History


Past Surgical History:  Pacemaker, CABG, Other (left eye tumor removal, lift)





Family History


Family History:  Diabetes, Heart Disease





Social History


Smoke:  No


ALCOHOL:  none


Drugs:  None





Current Problem List


Problem List


Problems


Medical Problems:


(1) Defibrillator discharge


Status: Acute  











Current Medications


Current Medications





Current Medications








 Medications


  (Trade)  Dose


 Ordered  Sig/Pedro Pablo  Start Time


 Stop Time Status Last Admin


Dose Admin


 


 Acetaminophen


  (Tylenol)  500 mg  QHS  10/22/18 21:00


    10/22/18 20:50


500 MG


 


 Aspirin


  (Children'S


 Aspirin)  324 mg  1X  ONCE  10/22/18 15:15


 10/22/18 15:16 DC 10/22/18 15:14


324 MG


 


 Aspirin


  (Ecotrin)  325 mg  DAILY16  10/23/18 16:00


     





 


 Atorvastatin


 Calcium


  (Lipitor)  20 mg  DAILY16  10/22/18 19:30


    10/22/18 20:51


20 MG


 


 Diphenhydramine


 HCl


  (Benadryl)  25 mg  QHS  10/22/18 21:00


    10/22/18 20:50


25 MG


 


 Fenofibrate


  (Lofibra)  134 mg  DAILY  10/23/18 09:00


     





 


 Magnesium Oxide


  (Magnesium Oxide)  400 mg  DAILY  10/23/18 09:00


     





 


 Magnesium Sulfate/


 Dextrose  100 ml @ 


 100 mls/hr  1X  ONCE  10/22/18 16:45


 10/22/18 17:44 DC 10/22/18 16:47


100 MLS/HR


 


 Metformin HCl


  (Glucophage)  500 mg  BIDWMEALS  10/22/18 19:30


    10/22/18 20:51


500 MG


 


 Multivitamins


  (Thera M Plus)  1 tab  DAILY  10/23/18 09:00


     





 


 Non-Formulary


 Medication


  ([melatonin] )  10 mg  QHS  10/22/18 21:00


   UNV  





 


 Propafenone HCl


  (Rythmol)  150 mg  BID  10/22/18 21:00


    10/22/18 20:50


150 MG











Allergies


Allergies





Allergies








Coded Allergies Type Severity Reaction Last Updated Verified


 


  Milk Containing Products Allergy Unknown  10/22/18 Yes











ROS


Review of System


CONSTITUTIONAL:        No fever or chills


EYES:                          No recent changes


SKIN:               No rash or itching


CARDIOVASCULAR:     No chest pain, syncope, palpitations, or edema


RESPIRATORY:            No SOB or cough


GASTROINTESTINAL:    No nausea, vomiting or abdominal pain


NEUROLOGICAL:          No headaches or weakness


ENDOCRINE:               No cold or heat intolerance


GENITOURINARY:         No urgency or frequency of urination


MUSCULOSKELETAL:   chronic back pain or joint pain


LYMPHATICS:               No enlarged lymph nodes


PSYCHIATRIC:              No anxiety or depression





Physical Exam


Physical Exam


GEN.:    No apparent distress.  Alert and oriented.


HEENT:    Head is normocephalic, atraumatic


NECK:    Supple.  


CHEST:            no tenderness, AICD in place


LUNGS:    Clear to auscultation.


HEART:    RRR, S1, S2 present.  Peripheral pulses intact


ABDOMEN:    Soft, nontender.  Positive bowel sounds.


EXTREMITIES:    Without any cyanosis.    


NEUROLOGIC:     Normal speech, normal tone


PSYCHIATRIC:    Normal affect, normal mood.


SKIN:   No ulcerations





Vitals


Vitals





Vital Signs








  Date Time  Temp Pulse Resp B/P (MAP) Pulse Ox O2 Delivery O2 Flow Rate FiO2


 


10/23/18 07:30 98.0 66 16 117/62 (80) 98 Room Air  





 98.0       











Labs


Labs





Laboratory Tests








Test


 10/22/18


15:37 10/22/18


19:25 10/22/18


22:20 10/23/18


07:41


 


White Blood Count


 6.0 x10^3/uL


(4.0-11.0) 


 


 





 


Red Blood Count


 3.68 x10^6/uL


(4.30-5.70) 


 


 





 


Hemoglobin


 10.7 g/dL


(13.0-17.5) 


 


 





 


Hematocrit


 31.9 %


(39.0-53.0) 


 


 





 


Mean Corpuscular Volume 87 fL ()    


 


Mean Corpuscular Hemoglobin 29 pg (25-35)    


 


Mean Corpuscular Hemoglobin


Concent 34 g/dL


(31-37) 


 


 





 


Red Cell Distribution Width


 14.0 %


(11.5-14.5) 


 


 





 


Platelet Count


 198 x10^3/uL


(140-400) 


 


 





 


Neutrophils (%) (Auto) 77 % (31-73)    


 


Lymphocytes (%) (Auto) 11 % (24-48)    


 


Monocytes (%) (Auto) 8 % (0-9)    


 


Eosinophils (%) (Auto) 4 % (0-3)    


 


Basophils (%) (Auto) 1 % (0-3)    


 


Neutrophils # (Auto)


 4.6 x10^3uL


(1.8-7.7) 


 


 





 


Lymphocytes # (Auto)


 0.7 x10^3/uL


(1.0-4.8) 


 


 





 


Monocytes # (Auto)


 0.5 x10^3/uL


(0.0-1.1) 


 


 





 


Eosinophils # (Auto)


 0.2 x10^3/uL


(0.0-0.7) 


 


 





 


Basophils # (Auto)


 0.0 x10^3/uL


(0.0-0.2) 


 


 





 


Sodium Level


 140 mmol/L


(136-145) 


 


 





 


Potassium Level


 4.4 mmol/L


(3.5-5.1) 


 


 





 


Chloride Level


 104 mmol/L


() 


 


 





 


Carbon Dioxide Level


 25 mmol/L


(21-32) 


 


 





 


Anion Gap 11 (6-14)    


 


Blood Urea Nitrogen


 24 mg/dL


(8-26) 


 


 





 


Creatinine


 1.3 mg/dL


(0.7-1.3) 


 


 





 


Estimated GFR


(Cockcroft-Gault) 53.0 


 


 


 





 


BUN/Creatinine Ratio 18 (6-20)    


 


Glucose Level


 114 mg/dL


(70-99) 


 


 





 


Calcium Level


 9.2 mg/dL


(8.5-10.1) 


 


 





 


Magnesium Level


 1.6 mg/dL


(1.8-2.4) 


 


 





 


Total Bilirubin


 0.3 mg/dL


(0.2-1.0) 


 


 





 


Aspartate Amino Transf


(AST/SGOT) 19 U/L (15-37) 


 


 


 





 


Alanine Aminotransferase


(ALT/SGPT) 21 U/L (16-63) 


 


 


 





 


Alkaline Phosphatase


 39 U/L


() 


 


 





 


Creatine Kinase


 28 U/L


() 


 


 





 


Creatine Kinase MB (Mass)


 0.7 ng/mL


(0.0-3.6) 


 


 





 


Creatine Kinase MB Relative


Index  % (0-4) 


 


 


 





 


Troponin I Quantitative


 0.052 ng/mL


(0.000-0.055) 0.148 ng/mL


(0.000-0.055) 0.144 ng/mL


(0.000-0.055) 





 


NT-Pro-B-Type Natriuretic


Peptide 974 pg/mL


(0-449) 


 


 





 


Total Protein


 6.5 g/dL


(6.4-8.2) 


 


 





 


Albumin


 3.1 g/dL


(3.4-5.0) 


 


 





 


Albumin/Globulin Ratio 0.9 (1.0-1.7)    


 


Glucose (Fingerstick)


 


 


 


 74 mg/dL


(70-99)








Laboratory Tests








Test


 10/22/18


15:37 10/22/18


19:25 10/22/18


22:20 10/23/18


07:41


 


White Blood Count


 6.0 x10^3/uL


(4.0-11.0) 


 


 





 


Red Blood Count


 3.68 x10^6/uL


(4.30-5.70) 


 


 





 


Hemoglobin


 10.7 g/dL


(13.0-17.5) 


 


 





 


Hematocrit


 31.9 %


(39.0-53.0) 


 


 





 


Mean Corpuscular Volume 87 fL ()    


 


Mean Corpuscular Hemoglobin 29 pg (25-35)    


 


Mean Corpuscular Hemoglobin


Concent 34 g/dL


(31-37) 


 


 





 


Red Cell Distribution Width


 14.0 %


(11.5-14.5) 


 


 





 


Platelet Count


 198 x10^3/uL


(140-400) 


 


 





 


Neutrophils (%) (Auto) 77 % (31-73)    


 


Lymphocytes (%) (Auto) 11 % (24-48)    


 


Monocytes (%) (Auto) 8 % (0-9)    


 


Eosinophils (%) (Auto) 4 % (0-3)    


 


Basophils (%) (Auto) 1 % (0-3)    


 


Neutrophils # (Auto)


 4.6 x10^3uL


(1.8-7.7) 


 


 





 


Lymphocytes # (Auto)


 0.7 x10^3/uL


(1.0-4.8) 


 


 





 


Monocytes # (Auto)


 0.5 x10^3/uL


(0.0-1.1) 


 


 





 


Eosinophils # (Auto)


 0.2 x10^3/uL


(0.0-0.7) 


 


 





 


Basophils # (Auto)


 0.0 x10^3/uL


(0.0-0.2) 


 


 





 


Sodium Level


 140 mmol/L


(136-145) 


 


 





 


Potassium Level


 4.4 mmol/L


(3.5-5.1) 


 


 





 


Chloride Level


 104 mmol/L


() 


 


 





 


Carbon Dioxide Level


 25 mmol/L


(21-32) 


 


 





 


Anion Gap 11 (6-14)    


 


Blood Urea Nitrogen


 24 mg/dL


(8-26) 


 


 





 


Creatinine


 1.3 mg/dL


(0.7-1.3) 


 


 





 


Estimated GFR


(Cockcroft-Gault) 53.0 


 


 


 





 


BUN/Creatinine Ratio 18 (6-20)    


 


Glucose Level


 114 mg/dL


(70-99) 


 


 





 


Calcium Level


 9.2 mg/dL


(8.5-10.1) 


 


 





 


Magnesium Level


 1.6 mg/dL


(1.8-2.4) 


 


 





 


Total Bilirubin


 0.3 mg/dL


(0.2-1.0) 


 


 





 


Aspartate Amino Transf


(AST/SGOT) 19 U/L (15-37) 


 


 


 





 


Alanine Aminotransferase


(ALT/SGPT) 21 U/L (16-63) 


 


 


 





 


Alkaline Phosphatase


 39 U/L


() 


 


 





 


Creatine Kinase


 28 U/L


() 


 


 





 


Creatine Kinase MB (Mass)


 0.7 ng/mL


(0.0-3.6) 


 


 





 


Creatine Kinase MB Relative


Index  % (0-4) 


 


 


 





 


Troponin I Quantitative


 0.052 ng/mL


(0.000-0.055) 0.148 ng/mL


(0.000-0.055) 0.144 ng/mL


(0.000-0.055) 





 


NT-Pro-B-Type Natriuretic


Peptide 974 pg/mL


(0-449) 


 


 





 


Total Protein


 6.5 g/dL


(6.4-8.2) 


 


 





 


Albumin


 3.1 g/dL


(3.4-5.0) 


 


 





 


Albumin/Globulin Ratio 0.9 (1.0-1.7)    


 


Glucose (Fingerstick)


 


 


 


 74 mg/dL


(70-99)











VTE Prophylaxis Ordered


VTE Prophylaxis Devices:  No


VTE Pharmacological Prophylaxi:  Yes





Assessment/Plan


Assessment/Plan


arrhythmia causing firing of AICD - admitted for cardiac monitoring, 

replacement of magnesium, cardiac consultation and start of Rythmol


elevated troponin- likely from defibrillator shock


hypomagnesemia - replace, he is not on a water pill


cardiomyopathy











FLORY WALTON MD Oct 23, 2018 08:57

## 2018-10-24 VITALS — SYSTOLIC BLOOD PRESSURE: 109 MMHG | DIASTOLIC BLOOD PRESSURE: 55 MMHG

## 2018-10-24 VITALS — DIASTOLIC BLOOD PRESSURE: 53 MMHG | SYSTOLIC BLOOD PRESSURE: 115 MMHG

## 2018-10-24 VITALS — DIASTOLIC BLOOD PRESSURE: 57 MMHG | SYSTOLIC BLOOD PRESSURE: 111 MMHG

## 2018-10-24 VITALS — SYSTOLIC BLOOD PRESSURE: 121 MMHG | DIASTOLIC BLOOD PRESSURE: 56 MMHG

## 2018-10-24 VITALS — DIASTOLIC BLOOD PRESSURE: 44 MMHG | SYSTOLIC BLOOD PRESSURE: 92 MMHG

## 2018-10-24 VITALS — SYSTOLIC BLOOD PRESSURE: 112 MMHG | DIASTOLIC BLOOD PRESSURE: 45 MMHG

## 2018-10-24 RX ADMIN — ATORVASTATIN CALCIUM SCH MG: 20 TABLET, FILM COATED ORAL at 21:41

## 2018-10-24 RX ADMIN — FENOFIBRATE SCH MG: 134 CAPSULE ORAL at 09:18

## 2018-10-24 RX ADMIN — ENOXAPARIN SODIUM SCH MG: 100 INJECTION SUBCUTANEOUS at 17:16

## 2018-10-24 RX ADMIN — MAGNESIUM OXIDE TAB 400 MG (241.3 MG ELEMENTAL MG) SCH MG: 400 (241.3 MG) TAB at 09:19

## 2018-10-24 RX ADMIN — ASPIRIN SCH MG: 325 TABLET, DELAYED RELEASE ORAL at 17:15

## 2018-10-24 RX ADMIN — PROPAFENONE HYDROCHLORIDE SCH MG: 150 TABLET, FILM COATED ORAL at 09:18

## 2018-10-24 RX ADMIN — MULTIPLE VITAMINS W/ MINERALS TAB SCH TAB: TAB at 09:19

## 2018-10-24 RX ADMIN — PROPAFENONE HYDROCHLORIDE SCH MG: 150 TABLET, FILM COATED ORAL at 21:42

## 2018-10-24 RX ADMIN — ACETAMINOPHEN SCH MG: 500 TABLET ORAL at 21:42

## 2018-10-24 NOTE — PDOC
PROGRESS NOTES


Subjective


Subjective


Patient doing well with no complaints at this time





Objective


Objective





Vital Signs








  Date Time  Temp Pulse Resp B/P (MAP) Pulse Ox O2 Delivery O2 Flow Rate FiO2


 


10/24/18 10:46 97.4 47 18 92/44 (60) 99 Room Air  





 97.4       














Intake and Output 


 


 10/24/18





 07:00


 


Intake Total 1200 ml


 


Output Total 1550 ml


 


Balance -350 ml


 


 


 


Intake Oral 1200 ml


 


Output Urine Total 1550 ml


 


# Voids 2











Physical Exam


Physical Exam


No significant change in cardiac exam





Assessment


Assessment


Problems


Medical Problems:


(1) Defibrillator discharge


Status: Acute  











Plan


Plan of Care


Patient has been having tachycardic/bradycardic events


Currently bradycardic with pulse in mid 40's


Check and reprogram PPM settings to prevent further bradycardic events


Continue Rhythmol and monitor overnight


Thank you very much for asking me to participate in the care of this patient





Comment


Review of Relevant


I have reviewed the following items falguni (where applicable) has been applied.


Labs





Laboratory Tests








Test


 10/22/18


15:37 10/22/18


19:25 10/22/18


22:20 10/23/18


07:41


 


White Blood Count


 6.0 x10^3/uL


(4.0-11.0) 


 


 





 


Red Blood Count


 3.68 x10^6/uL


(4.30-5.70) 


 


 





 


Hemoglobin


 10.7 g/dL


(13.0-17.5) 


 


 





 


Hematocrit


 31.9 %


(39.0-53.0) 


 


 





 


Mean Corpuscular Volume 87 fL ()    


 


Mean Corpuscular Hemoglobin 29 pg (25-35)    


 


Mean Corpuscular Hemoglobin


Concent 34 g/dL


(31-37) 


 


 





 


Red Cell Distribution Width


 14.0 %


(11.5-14.5) 


 


 





 


Platelet Count


 198 x10^3/uL


(140-400) 


 


 





 


Neutrophils (%) (Auto) 77 % (31-73)    


 


Lymphocytes (%) (Auto) 11 % (24-48)    


 


Monocytes (%) (Auto) 8 % (0-9)    


 


Eosinophils (%) (Auto) 4 % (0-3)    


 


Basophils (%) (Auto) 1 % (0-3)    


 


Neutrophils # (Auto)


 4.6 x10^3uL


(1.8-7.7) 


 


 





 


Lymphocytes # (Auto)


 0.7 x10^3/uL


(1.0-4.8) 


 


 





 


Monocytes # (Auto)


 0.5 x10^3/uL


(0.0-1.1) 


 


 





 


Eosinophils # (Auto)


 0.2 x10^3/uL


(0.0-0.7) 


 


 





 


Basophils # (Auto)


 0.0 x10^3/uL


(0.0-0.2) 


 


 





 


Sodium Level


 140 mmol/L


(136-145) 


 


 





 


Potassium Level


 4.4 mmol/L


(3.5-5.1) 


 


 





 


Chloride Level


 104 mmol/L


() 


 


 





 


Carbon Dioxide Level


 25 mmol/L


(21-32) 


 


 





 


Anion Gap 11 (6-14)    


 


Blood Urea Nitrogen


 24 mg/dL


(8-26) 


 


 





 


Creatinine


 1.3 mg/dL


(0.7-1.3) 


 


 





 


Estimated GFR


(Cockcroft-Gault) 53.0 


 


 


 





 


BUN/Creatinine Ratio 18 (6-20)    


 


Glucose Level


 114 mg/dL


(70-99) 


 


 





 


Calcium Level


 9.2 mg/dL


(8.5-10.1) 


 


 





 


Magnesium Level


 1.6 mg/dL


(1.8-2.4) 


 


 





 


Total Bilirubin


 0.3 mg/dL


(0.2-1.0) 


 


 





 


Aspartate Amino Transf


(AST/SGOT) 19 U/L (15-37) 


 


 


 





 


Alanine Aminotransferase


(ALT/SGPT) 21 U/L (16-63) 


 


 


 





 


Alkaline Phosphatase


 39 U/L


() 


 


 





 


Creatine Kinase


 28 U/L


() 


 


 





 


Creatine Kinase MB (Mass)


 0.7 ng/mL


(0.0-3.6) 


 


 





 


Creatine Kinase MB Relative


Index  % (0-4) 


 


 


 





 


Troponin I Quantitative


 0.052 ng/mL


(0.000-0.055) 0.148 ng/mL


(0.000-0.055) 0.144 ng/mL


(0.000-0.055) 





 


NT-Pro-B-Type Natriuretic


Peptide 974 pg/mL


(0-449) 


 


 





 


Total Protein


 6.5 g/dL


(6.4-8.2) 


 


 





 


Albumin


 3.1 g/dL


(3.4-5.0) 


 


 





 


Albumin/Globulin Ratio 0.9 (1.0-1.7)    


 


Glucose (Fingerstick)


 


 


 


 74 mg/dL


(70-99)


 


Test


 10/24/18


08:05 


 


 





 


Glucose (Fingerstick)


 73 mg/dL


(70-99) 


 


 











Laboratory Tests








Test


 10/24/18


08:05


 


Glucose (Fingerstick)


 73 mg/dL


(70-99)








Medications





Current Medications


Aspirin (Children'S Aspirin) 324 mg 1X  ONCE PO  Last administered on 10/22/

18at 15:14;  Start 10/22/18 at 15:15;  Stop 10/22/18 at 15:16;  Status DC


Magnesium Sulfate/ Dextrose 100 ml @  100 mls/hr 1X  ONCE IV  Last administered 

on 10/22/18at 16:47;  Start 10/22/18 at 16:45;  Stop 10/22/18 at 17:44;  Status 

DC


Propafenone HCl (Rythmol) 150 mg BID PO  Last administered on 10/24/18at 09:18;

  Start 10/22/18 at 21:00


Aspirin (Ecotrin) 325 mg DAILY16 PO  Last administered on 10/23/18at 16:58;  

Start 10/23/18 at 16:00


Atorvastatin Calcium (Lipitor) 20 mg DAILY16 PO  Last administered on 10/23/

18at 16:58;  Start 10/22/18 at 19:30


Acetaminophen (Tylenol) 500 mg QHS PO  Last administered on 10/23/18at 20:46;  

Start 10/22/18 at 21:00


Fenofibrate (Lofibra) 134 mg DAILY PO  Last administered on 10/24/18at 09:18;  

Start 10/23/18 at 09:00


Magnesium Oxide (Magnesium Oxide) 400 mg DAILY PO  Last administered on 10/24/

18at 09:19;  Start 10/23/18 at 09:00


Metformin HCl (Glucophage) 500 mg BIDWMEALS PO  Last administered on 10/24/18at 

09:19;  Start 10/22/18 at 19:30


Multivitamins (Thera M Plus) 1 tab DAILY PO  Last administered on 10/24/18at 09:

19;  Start 10/23/18 at 09:00


Non-Formulary Medication ([melatonin] ) 10 mg QHS PO ;  Start 10/22/18 at 21:00

;  Status UNV


Diphenhydramine HCl (Benadryl) 25 mg QHS PO  Last administered on 10/23/18at 20:

46;  Start 10/22/18 at 21:00


Enoxaparin Sodium (Lovenox 30mg Syringe) 30 mg Q24H SQ  Last administered on 10/

23/18at 16:59;  Start 10/23/18 at 17:00





Active Scripts


Active


Reported


Magnesium (Magnesium Oxide) 400 Mg Capsule 250 Mg PO DAILY


[melatonin]   10 Mg PO QHS


Centrum Silver Tablet (Multivits-Min/Fa/Lycopene/Lut) 1 Each Tablet 1 Tab  DAILY


Acetadryl 500-25 Mg Caplet (Acetaminophen/Diphenhydramine) 1 Each Tablet 1 Tab  

HS


Aspir-Vilma (Aspirin) 325 Mg Tablet.dr 1 Tab PO DAILY16


Metformin Hcl 500 Mg Tablet 1 Tab  BIDBFRMEAL


Atorvastatin Calcium 20 Mg Tablet 1 Tab  DAILY16


Fenofibrate 160 Mg Tablet 1 Tab  DAILY


Vitals/I & O





Vital Sign - Last 24 Hours








 10/23/18 10/23/18 10/23/18 10/23/18





 15:42 19:05 20:15 20:47


 


Temp 97.4 97.6  





 97.4 97.6  


 


Pulse 47 50  55


 


Resp 16 18  


 


B/P (MAP) 123/47 (72) 130/55 (80)  130/55


 


Pulse Ox 96 95  


 


O2 Delivery Room Air Room Air Room Air 


 


    





    





 10/23/18 10/24/18 10/24/18 10/24/18





 23:05 03:05 07:30 09:18


 


Temp 97.8 98.1 97.5 





 97.8 98.1 97.5 


 


Pulse 55 45 62 62


 


Resp 16 18 18 


 


B/P (MAP) 119/51 (73) 115/53 (73) 109/55 (73) 109/55


 


Pulse Ox 98 98 97 


 


O2 Delivery Room Air Room Air Room Air 


 


    





    





 10/24/18   





 10:46   


 


Temp 97.4   





 97.4   


 


Pulse 47   


 


Resp 18   


 


B/P (MAP) 92/44 (60)   


 


Pulse Ox 99   


 


O2 Delivery Room Air   














Intake and Output   


 


 10/23/18 10/23/18 10/24/18





 15:00 23:00 07:00


 


Intake Total  500 ml 700 ml


 


Output Total 300 ml 600 ml 650 ml


 


Balance -300 ml -100 ml 50 ml

















OSWALD COE MD Oct 24, 2018 11:54

## 2018-10-24 NOTE — PDOC
PROGRESS NOTES


Subjective


He has had some lightheadedness and HR has been from low 40s to 140.  Pacemaker 

set at HR of 40.  No Vtach


Objective


Afebrile


General:  NAD


Heart:  bradycardic 40-50s


Lungs:  CTAB


Abd:  soft and non tender


Ext:  no C/C/E


Vital Signs





Vital Signs








  Date Time  Temp Pulse Resp B/P (MAP) Pulse Ox O2 Delivery O2 Flow Rate FiO2


 


10/24/18 10:46 97.4 47 18 92/44 (60) 99 Room Air  





 97.4       








I & O











Intake and Output 


 


 10/24/18





 07:00


 


Intake Total 1200 ml


 


Output Total 1550 ml


 


Balance -350 ml


 


 


 


Intake Oral 1200 ml


 


Output Urine Total 1550 ml


 


# Voids 2








Assessment and Plan





(1) Defibrillator discharge


(2) SSS - continue to monitor and adjust meds, cardiology followng


(3) weakness - PT/OT


(4) hypomagnesemia - replace











FLORY WALTON MD Oct 24, 2018 13:13

## 2018-10-25 VITALS — DIASTOLIC BLOOD PRESSURE: 48 MMHG | SYSTOLIC BLOOD PRESSURE: 85 MMHG

## 2018-10-25 VITALS — DIASTOLIC BLOOD PRESSURE: 56 MMHG | SYSTOLIC BLOOD PRESSURE: 116 MMHG

## 2018-10-25 VITALS — DIASTOLIC BLOOD PRESSURE: 49 MMHG | SYSTOLIC BLOOD PRESSURE: 93 MMHG

## 2018-10-25 VITALS — DIASTOLIC BLOOD PRESSURE: 51 MMHG | SYSTOLIC BLOOD PRESSURE: 97 MMHG

## 2018-10-25 VITALS — SYSTOLIC BLOOD PRESSURE: 94 MMHG | DIASTOLIC BLOOD PRESSURE: 51 MMHG

## 2018-10-25 VITALS — DIASTOLIC BLOOD PRESSURE: 48 MMHG | SYSTOLIC BLOOD PRESSURE: 102 MMHG

## 2018-10-25 LAB
BASOPHILS # BLD AUTO: 0 X10^3/UL (ref 0–0.2)
BASOPHILS NFR BLD: 0 % (ref 0–3)
EOSINOPHIL NFR BLD: 0.2 X10^3/UL (ref 0–0.7)
EOSINOPHIL NFR BLD: 2 % (ref 0–3)
ERYTHROCYTE [DISTWIDTH] IN BLOOD BY AUTOMATED COUNT: 13.7 % (ref 11.5–14.5)
HCT VFR BLD CALC: 36 % (ref 39–53)
HGB BLD-MCNC: 12.2 G/DL (ref 13–17.5)
LYMPHOCYTES # BLD: 1 X10^3/UL (ref 1–4.8)
LYMPHOCYTES NFR BLD AUTO: 10 % (ref 24–48)
MCH RBC QN AUTO: 29 PG (ref 25–35)
MCHC RBC AUTO-ENTMCNC: 34 G/DL (ref 31–37)
MCV RBC AUTO: 87 FL (ref 79–100)
MONO #: 0.8 X10^3/UL (ref 0–1.1)
MONOCYTES NFR BLD: 8 % (ref 0–9)
NEUT #: 7.8 X10^3UL (ref 1.8–7.7)
NEUTROPHILS NFR BLD AUTO: 80 % (ref 31–73)
PLATELET # BLD AUTO: 251 X10^3/UL (ref 140–400)
RBC # BLD AUTO: 4.15 X10^6/UL (ref 4.3–5.7)
WBC # BLD AUTO: 9.8 X10^3/UL (ref 4–11)

## 2018-10-25 RX ADMIN — ACETAMINOPHEN SCH MG: 500 TABLET ORAL at 20:54

## 2018-10-25 RX ADMIN — ATORVASTATIN CALCIUM SCH MG: 20 TABLET, FILM COATED ORAL at 20:54

## 2018-10-25 RX ADMIN — MAGNESIUM OXIDE TAB 400 MG (241.3 MG ELEMENTAL MG) SCH MG: 400 (241.3 MG) TAB at 08:53

## 2018-10-25 RX ADMIN — MULTIPLE VITAMINS W/ MINERALS TAB SCH TAB: TAB at 08:53

## 2018-10-25 RX ADMIN — ASPIRIN SCH MG: 325 TABLET, DELAYED RELEASE ORAL at 18:06

## 2018-10-25 RX ADMIN — ENOXAPARIN SODIUM SCH MG: 100 INJECTION SUBCUTANEOUS at 18:09

## 2018-10-25 RX ADMIN — PROPAFENONE HYDROCHLORIDE SCH MG: 150 TABLET, FILM COATED ORAL at 08:53

## 2018-10-25 RX ADMIN — FENOFIBRATE SCH MG: 134 CAPSULE ORAL at 08:53

## 2018-10-25 RX ADMIN — PROPAFENONE HYDROCHLORIDE SCH MG: 150 TABLET, FILM COATED ORAL at 20:54

## 2018-10-25 RX ADMIN — MAGNESIUM OXIDE TAB 400 MG (241.3 MG ELEMENTAL MG) SCH MG: 400 (241.3 MG) TAB at 20:54

## 2018-10-25 NOTE — PDOC
PROGRESS NOTES


Subjective


MG was still low so replacing IV and increasing po dose.  His wife is worried 

about his BP being low (SBP 85).  He denies being lightheaded.  Monitor not 

showing significant arrhythmia


Objective


Afebrile


General:  NAD, A&O


Heart:  RRR


Lungs:  CTAB


Abd:  soft and non distended


Ext:  no C/C/E


Vital Signs





Vital Signs








  Date Time  Temp Pulse Resp B/P (MAP) Pulse Ox O2 Delivery O2 Flow Rate FiO2


 


10/25/18 10:29 99.2 63 20 85/48 (60) 97 Room Air  





 99.2       








I & O











Intake and Output 


 


 10/25/18





 07:00


 


Intake Total 840 ml


 


Output Total 1250 ml


 


Balance -410 ml


 


 


 


Intake Oral 840 ml


 


Output Urine Total 1250 ml


 


# Voids 2








Assessment and Plan


(1) Defibrillator discharge


(2) SSS - continue to monitor and adjust meds, cardiology followng


(3) weakness - PT/OT


(4) hypomagnesemia - replace











FLORY WALTON MD Oct 25, 2018 12:13

## 2018-10-25 NOTE — PDOC
PROGRESS NOTES


Subjective


Subjective


pt is feeling well this morning after his pacemaker was paced at a rate of 60


he denies any other chest pain, shortness of breath or light headedness


patient is going to try walking around the unit as he feels able to perform 

activity today





Objective


Objective





Vital Signs








  Date Time  Temp Pulse Resp B/P (MAP) Pulse Ox O2 Delivery O2 Flow Rate FiO2


 


10/25/18 10:29 99.2 63 20 85/48 (60) 97 Room Air  





 99.2       














Intake and Output 


 


 10/25/18





 07:00


 


Intake Total 840 ml


 


Output Total 1250 ml


 


Balance -410 ml


 


 


 


Intake Oral 840 ml


 


Output Urine Total 1250 ml


 


# Voids 2











Physical Exam


Heart:  Regular rate, Normal S1, Normal S2, No murmurs


Extremities:  No clubbing, No cyanosis, No edema, Normal pulses


General:  Alert, Oriented X3, Cooperative, No acute distress


Lungs:  Clear to auscultation, Normal air movement





Assessment


Assessment


Problems


Medical Problems:


(1) Defibrillator discharge


Status: Acute  











Plan


Plan of Care


continue to monitor patient at new pacemaker rate


encouraged patient to walk around the unit and monitor heart rhythm  


if patient can tolerate activity, would recommend discharge in AM if okay from 

primary physician





Comment


Review of Relevant


I have reviewed the following items falguni (where applicable) has been applied.


Labs





Laboratory Tests








Test


 10/24/18


08:05 10/24/18


14:35 10/25/18


08:13


 


Glucose (Fingerstick)


 73 mg/dL


(70-99) 


 81 mg/dL


(70-99)


 


Magnesium Level


 


 1.6 mg/dL


(1.8-2.4) 











Laboratory Tests








Test


 10/24/18


14:35 10/25/18


08:13


 


Magnesium Level


 1.6 mg/dL


(1.8-2.4) 





 


Glucose (Fingerstick)


 


 81 mg/dL


(70-99)








Medications





Current Medications


Aspirin (Children'S Aspirin) 324 mg 1X  ONCE PO  Last administered on 10/22/

18at 15:14;  Start 10/22/18 at 15:15;  Stop 10/22/18 at 15:16;  Status DC


Magnesium Sulfate/ Dextrose 100 ml @  100 mls/hr 1X  ONCE IV  Last administered 

on 10/22/18at 16:47;  Start 10/22/18 at 16:45;  Stop 10/22/18 at 17:44;  Status 

DC


Propafenone HCl (Rythmol) 150 mg BID PO  Last administered on 10/25/18at 08:53;

  Start 10/22/18 at 21:00


Aspirin (Ecotrin) 325 mg DAILY16 PO  Last administered on 10/24/18at 17:15;  

Start 10/23/18 at 16:00


Atorvastatin Calcium (Lipitor) 20 mg DAILY16 PO  Last administered on 10/23/

18at 16:58;  Start 10/22/18 at 19:30;  Stop 10/24/18 at 16:02;  Status DC


Acetaminophen (Tylenol) 500 mg QHS PO  Last administered on 10/24/18at 21:42;  

Start 10/22/18 at 21:00


Fenofibrate (Lofibra) 134 mg DAILY PO  Last administered on 10/25/18at 08:53;  

Start 10/23/18 at 09:00


Magnesium Oxide (Magnesium Oxide) 400 mg DAILY PO  Last administered on 10/25/

18at 08:53;  Start 10/23/18 at 09:00;  Stop 10/25/18 at 09:40;  Status DC


Metformin HCl (Glucophage) 500 mg BIDWMEALS PO  Last administered on 10/25/18at 

08:53;  Start 10/22/18 at 19:30


Multivitamins (Thera M Plus) 1 tab DAILY PO  Last administered on 10/25/18at 08:

53;  Start 10/23/18 at 09:00


Non-Formulary Medication ([melatonin] ) 10 mg QHS PO ;  Start 10/22/18 at 21:00

;  Status UNV


Diphenhydramine HCl (Benadryl) 25 mg QHS PO  Last administered on 10/24/18at 21:

42;  Start 10/22/18 at 21:00


Enoxaparin Sodium (Lovenox 30mg Syringe) 30 mg Q24H SQ  Last administered on 10/

24/18at 17:16;  Start 10/23/18 at 17:00


Atorvastatin Calcium (Lipitor) 20 mg HS PO  Last administered on 10/24/18at 21:

41;  Start 10/24/18 at 21:00


Magnesium Oxide (Magnesium Oxide) 400 mg BID PO ;  Start 10/25/18 at 21:00


Magnesium Sulfate 50 ml @ 25 mls/hr 1X  ONCE IV  Last administered on 10/25/

18at 10:54;  Start 10/25/18 at 10:00;  Stop 10/25/18 at 11:59;  Status DC





Active Scripts


Active


Reported


Magnesium (Magnesium Oxide) 400 Mg Capsule 250 Mg PO DAILY


[melatonin]   10 Mg PO QHS


Centrum Silver Tablet (Multivits-Min/Fa/Lycopene/Lut) 1 Each Tablet 1 Tab  DAILY


Acetadryl 500-25 Mg Caplet (Acetaminophen/Diphenhydramine) 1 Each Tablet 1 Tab  

HS


Aspir-Vilma (Aspirin) 325 Mg Tablet.dr 1 Tab PO DAILY16


Metformin Hcl 500 Mg Tablet 1 Tab  BIDBFRMEAL


Atorvastatin Calcium 20 Mg Tablet 1 Tab  DAILY16


Fenofibrate 160 Mg Tablet 1 Tab  DAILY


Vitals/I & O





Vital Sign - Last 24 Hours








 10/24/18 10/24/18 10/24/18 10/24/18





 15:11 19:23 20:00 21:42


 


Temp 97.4 98.4  





 97.4 98.4  


 


Pulse 44 53  73


 


Resp 18 18  


 


B/P (MAP) 111/57 (75) 112/45 (67)  112/45


 


Pulse Ox 98 97  


 


O2 Delivery Room Air Room Air Room Air 


 


    





    





 10/24/18 10/25/18 10/25/18 10/25/18





 23:05 03:24 07:00 08:53


 


Temp 98.4 98.3 98.6 





 98.4 98.3 98.6 


 


Pulse 76 70 74 89


 


Resp 16 18 20 


 


B/P (MAP) 121/56 (77) 93/49 (64) 97/51 (66) 97/51


 


Pulse Ox 96 97 97 


 


O2 Delivery Room Air Room Air Room Air 


 


    





    





 10/25/18   





 10:29   


 


Temp 99.2   





 99.2   


 


Pulse 63   


 


Resp 20   


 


B/P (MAP) 85/48 (60)   


 


Pulse Ox 97   


 


O2 Delivery Room Air   














Intake and Output   


 


 10/24/18 10/24/18 10/25/18





 15:00 23:00 07:00


 


Intake Total  540 ml 300 ml


 


Output Total  650 ml 600 ml


 


Balance  -110 ml -300 ml

















OSWALD COE MD Oct 25, 2018 12:31

## 2018-10-26 VITALS — DIASTOLIC BLOOD PRESSURE: 57 MMHG | SYSTOLIC BLOOD PRESSURE: 103 MMHG

## 2018-10-26 VITALS — SYSTOLIC BLOOD PRESSURE: 118 MMHG | DIASTOLIC BLOOD PRESSURE: 57 MMHG

## 2018-10-26 VITALS — DIASTOLIC BLOOD PRESSURE: 57 MMHG | SYSTOLIC BLOOD PRESSURE: 100 MMHG

## 2018-10-26 VITALS — DIASTOLIC BLOOD PRESSURE: 59 MMHG | SYSTOLIC BLOOD PRESSURE: 103 MMHG

## 2018-10-26 LAB
ANION GAP SERPL CALC-SCNC: 7 MMOL/L (ref 6–14)
BUN SERPL-MCNC: 25 MG/DL (ref 8–26)
CALCIUM SERPL-MCNC: 9.5 MG/DL (ref 8.5–10.1)
CHLORIDE SERPL-SCNC: 106 MMOL/L (ref 98–107)
CO2 SERPL-SCNC: 30 MMOL/L (ref 21–32)
CREAT SERPL-MCNC: 1.4 MG/DL (ref 0.7–1.3)
GFR SERPLBLD BASED ON 1.73 SQ M-ARVRAT: 48.6 ML/MIN
GLUCOSE SERPL-MCNC: 68 MG/DL (ref 70–99)
MAGNESIUM SERPL-MCNC: 2.1 MG/DL (ref 1.8–2.4)
POTASSIUM SERPL-SCNC: 4.5 MMOL/L (ref 3.5–5.1)
SODIUM SERPL-SCNC: 143 MMOL/L (ref 136–145)

## 2018-10-26 RX ADMIN — FENOFIBRATE SCH MG: 134 CAPSULE ORAL at 10:08

## 2018-10-26 RX ADMIN — MAGNESIUM OXIDE TAB 400 MG (241.3 MG ELEMENTAL MG) SCH MG: 400 (241.3 MG) TAB at 10:08

## 2018-10-26 RX ADMIN — MULTIPLE VITAMINS W/ MINERALS TAB SCH TAB: TAB at 10:08

## 2018-10-26 RX ADMIN — PROPAFENONE HYDROCHLORIDE SCH MG: 150 TABLET, FILM COATED ORAL at 10:08

## 2018-10-26 NOTE — DISCH
DISCHARGE


DISCHARGE INFORMATION:


DISCHARGE DATE:  Oct 26, 2018


FINAL DIAGNOSIS


Problems


Medical Problems:


(1) Defibrillator discharge


Status: Acute  





CONDITION ON DISCHARGE:  Stable





CODE STATUS:


Code Status:  Full





POST DISCHARGE ORDERS:


ACTIVITY ORDERS:  Activity as tolerated


WEIGHT BEARING STATUS:  As tolerated


DIET AFTER DISCHARGE:  Cardiac





FOLLOW-UP:


PHYSICIAN FOLLOW-UP:  Jace Thrasher and L[facility] within 2 weeks





TREATMENT/EQUIPMENT ORDERS:


ADAPTIVE EQUIPMENT NEEDED:  None


Physical Therapy For:  Evalulation/Treatment


Occupational Therapy For:  Evaluation/Treatment





DISCHARGE MEDICATIONS:


Home Meds


Reported Medications


Magnesium Oxide (MAGNESIUM) 400 Mg Capsule, 250 MG PO DAILY, CAP


   10/22/18


[melatonin]   No Conflict Check, 10 MG PO QHS


   5/24/18


Multivits-Min/Fa/Lycopene/Lut (CENTRUM SILVER TABLET) 1 Each Tablet, 1 TAB DAILY


   2/8/14


Acetaminophen/Diphenhydramine (ACETADRYL 500-25 MG CAPLET) 1 Each Tablet, 1 TAB 

HS


   2/8/14


Aspirin (ASPIR-CAMI) 325 Mg Tablet., 1 TAB PO DAILY16


   2/8/14


Metformin Hcl (METFORMIN HCL) 500 Mg Tablet, 1 TAB BIDBFRMEAL


   2/8/14


Atorvastatin Calcium (ATORVASTATIN CALCIUM) 20 Mg Tablet, 1 TAB DAILY16


   2/8/14


Fenofibrate (FENOFIBRATE) 160 Mg Tablet, 1 TAB DAILY


   2/8/14











FLORY WALTON MD Oct 26, 2018 12:52

## 2018-10-26 NOTE — PDOC
PROGRESS NOTES


Subjective


Subjective


Pt feeling much better today.





Objective


Objective





Vital Signs








  Date Time  Temp Pulse Resp B/P (MAP) Pulse Ox O2 Delivery O2 Flow Rate FiO2


 


10/26/18 15:14 97.5 83 16 103/57 (72) 98 Room Air  





 97.5       


 


10/25/18 15:49       97.0 














Intake and Output 


 


 10/26/18





 07:00


 


Intake Total 400 ml


 


Output Total 1100 ml


 


Balance -700 ml


 


 


 


Intake Oral 400 ml


 


Output Urine Total 1100 ml


 


# Voids 1











Physical Exam


Physical Exam


No significant cardiac changes.





Assessment


Assessment


Problems


Medical Problems:


(1) Defibrillator discharge


Status: Acute  











Plan


Plan of Care


Pt is okay to discharge from my perspective.





Follow up at clinic in 2 weeks.





Comment


Review of Relevant


I have reviewed the following items falguni (where applicable) has been applied.


Labs





Laboratory Tests








Test


 10/25/18


08:13 10/25/18


12:50 10/26/18


05:05 10/26/18


07:45


 


Glucose (Fingerstick)


 81 mg/dL


(70-99) 


 


 77 mg/dL


(70-99)


 


White Blood Count


 


 9.8 x10^3/uL


(4.0-11.0) 


 





 


Red Blood Count


 


 4.15 x10^6/uL


(4.30-5.70) 


 





 


Hemoglobin


 


 12.2 g/dL


(13.0-17.5) 


 





 


Hematocrit


 


 36.0 %


(39.0-53.0) 


 





 


Mean Corpuscular Volume  87 fL ()   


 


Mean Corpuscular Hemoglobin  29 pg (25-35)   


 


Mean Corpuscular Hemoglobin


Concent 


 34 g/dL


(31-37) 


 





 


Red Cell Distribution Width


 


 13.7 %


(11.5-14.5) 


 





 


Platelet Count


 


 251 x10^3/uL


(140-400) 


 





 


Neutrophils (%) (Auto)  80 % (31-73)   


 


Lymphocytes (%) (Auto)  10 % (24-48)   


 


Monocytes (%) (Auto)  8 % (0-9)   


 


Eosinophils (%) (Auto)  2 % (0-3)   


 


Basophils (%) (Auto)  0 % (0-3)   


 


Neutrophils # (Auto)


 


 7.8 x10^3uL


(1.8-7.7) 


 





 


Lymphocytes # (Auto)


 


 1.0 x10^3/uL


(1.0-4.8) 


 





 


Monocytes # (Auto)


 


 0.8 x10^3/uL


(0.0-1.1) 


 





 


Eosinophils # (Auto)


 


 0.2 x10^3/uL


(0.0-0.7) 


 





 


Basophils # (Auto)


 


 0.0 x10^3/uL


(0.0-0.2) 


 





 


Sodium Level


 


 


 143 mmol/L


(136-145) 





 


Potassium Level


 


 


 4.5 mmol/L


(3.5-5.1) 





 


Chloride Level


 


 


 106 mmol/L


() 





 


Carbon Dioxide Level


 


 


 30 mmol/L


(21-32) 





 


Anion Gap   7 (6-14)  


 


Blood Urea Nitrogen


 


 


 25 mg/dL


(8-26) 





 


Creatinine


 


 


 1.4 mg/dL


(0.7-1.3) 





 


Estimated GFR


(Cockcroft-Gault) 


 


 48.6 


 





 


Glucose Level


 


 


 68 mg/dL


(70-99) 





 


Calcium Level


 


 


 9.5 mg/dL


(8.5-10.1) 





 


Magnesium Level


 


 


 2.1 mg/dL


(1.8-2.4) 











Laboratory Tests








Test


 10/26/18


05:05 10/26/18


07:45


 


Sodium Level


 143 mmol/L


(136-145) 





 


Potassium Level


 4.5 mmol/L


(3.5-5.1) 





 


Chloride Level


 106 mmol/L


() 





 


Carbon Dioxide Level


 30 mmol/L


(21-32) 





 


Anion Gap 7 (6-14)  


 


Blood Urea Nitrogen


 25 mg/dL


(8-26) 





 


Creatinine


 1.4 mg/dL


(0.7-1.3) 





 


Estimated GFR


(Cockcroft-Gault) 48.6 


 





 


Glucose Level


 68 mg/dL


(70-99) 





 


Calcium Level


 9.5 mg/dL


(8.5-10.1) 





 


Magnesium Level


 2.1 mg/dL


(1.8-2.4) 





 


Glucose (Fingerstick)


 


 77 mg/dL


(70-99)








Medications





Current Medications


Aspirin (Children'S Aspirin) 324 mg 1X  ONCE PO  Last administered on 10/22/

18at 15:14;  Start 10/22/18 at 15:15;  Stop 10/22/18 at 15:16;  Status DC


Magnesium Sulfate/ Dextrose 100 ml @  100 mls/hr 1X  ONCE IV  Last administered 

on 10/22/18at 16:47;  Start 10/22/18 at 16:45;  Stop 10/22/18 at 17:44;  Status 

DC


Propafenone HCl (Rythmol) 150 mg BID PO  Last administered on 10/26/18at 10:08;

  Start 10/22/18 at 21:00


Aspirin (Ecotrin) 325 mg DAILY16 PO  Last administered on 10/25/18at 18:06;  

Start 10/23/18 at 16:00


Atorvastatin Calcium (Lipitor) 20 mg DAILY16 PO  Last administered on 10/23/

18at 16:58;  Start 10/22/18 at 19:30;  Stop 10/24/18 at 16:02;  Status DC


Acetaminophen (Tylenol) 500 mg QHS PO  Last administered on 10/25/18at 20:54;  

Start 10/22/18 at 21:00


Fenofibrate (Lofibra) 134 mg DAILY PO  Last administered on 10/26/18at 10:08;  

Start 10/23/18 at 09:00


Magnesium Oxide (Magnesium Oxide) 400 mg DAILY PO  Last administered on 10/25/

18at 08:53;  Start 10/23/18 at 09:00;  Stop 10/25/18 at 09:40;  Status DC


Metformin HCl (Glucophage) 500 mg BIDWMEALS PO  Last administered on 10/26/18at 

10:08;  Start 10/22/18 at 19:30


Multivitamins (Thera M Plus) 1 tab DAILY PO  Last administered on 10/26/18at 10:

08;  Start 10/23/18 at 09:00


Non-Formulary Medication ([melatonin] ) 10 mg QHS PO ;  Start 10/22/18 at 21:00

;  Status UNV


Diphenhydramine HCl (Benadryl) 25 mg QHS PO  Last administered on 10/25/18at 20:

54;  Start 10/22/18 at 21:00


Enoxaparin Sodium (Lovenox 30mg Syringe) 30 mg Q24H SQ  Last administered on 10/

25/18at 18:09;  Start 10/23/18 at 17:00


Atorvastatin Calcium (Lipitor) 20 mg HS PO  Last administered on 10/25/18at 20:

54;  Start 10/24/18 at 21:00


Magnesium Oxide (Magnesium Oxide) 400 mg BID PO  Last administered on 10/26/

18at 10:08;  Start 10/25/18 at 21:00


Magnesium Sulfate 50 ml @ 25 mls/hr 1X  ONCE IV  Last administered on 10/25/

18at 10:54;  Start 10/25/18 at 10:00;  Stop 10/25/18 at 11:59;  Status DC





Active Scripts


Active


Reported


Magnesium (Magnesium Oxide) 400 Mg Capsule 250 Mg PO DAILY


[melatonin]   10 Mg PO QHS


Centrum Silver Tablet (Multivits-Min/Fa/Lycopene/Lut) 1 Each Tablet 1 Tab  DAILY


Acetadryl 500-25 Mg Caplet (Acetaminophen/Diphenhydramine) 1 Each Tablet 1 Tab  

HS


Aspir-Vilma (Aspirin) 325 Mg Tablet.dr 1 Tab PO DAILY16


Metformin Hcl 500 Mg Tablet 1 Tab  BIDBFRMEAL


Atorvastatin Calcium 20 Mg Tablet 1 Tab  DAILY16


Fenofibrate 160 Mg Tablet 1 Tab  DAILY


Vitals/I & O





Vital Sign - Last 24 Hours








 10/25/18 10/25/18 10/25/18 10/25/18





 15:49 19:36 20:00 20:54


 


Temp 97.0 97.5  





 97.0 97.5  


 


Pulse 74 61  61


 


Resp 20 18  


 


B/P (MAP) 116/56 (76) 102/48 (66)  102/48


 


Pulse Ox  98  


 


O2 Delivery Room Air Room Air Room Air 


 


O2 Flow Rate 97.0   


 


    





    





 10/25/18 10/26/18 10/26/18 10/26/18





 23:02 03:43 07:25 07:40


 


Temp 98.5 97.9 97.5 





 98.5 97.9 97.5 


 


Pulse 66 77 83 


 


Resp 16 16 16 


 


B/P (MAP) 94/51 (65) 103/59 (74) 100/57 (71) 


 


Pulse Ox 96 97 98 


 


O2 Delivery Room Air Room Air Room Air Room Air


 


    





    





 10/26/18 10/26/18 10/26/18 





 10:08 11:17 15:14 


 


Temp  97.7 97.5 





  97.7 97.5 


 


Pulse 85 83 83 


 


Resp  16 16 


 


B/P (MAP)  118/57 (77) 103/57 (72) 


 


Pulse Ox  98 98 


 


O2 Delivery  Room Air Room Air 














Intake and Output   


 


 10/25/18 10/25/18 10/26/18





 15:00 23:00 07:00


 


Intake Total   400 ml


 


Output Total  600 ml 500 ml


 


Balance  -600 ml -100 ml

















OSWALD COE MD Oct 26, 2018 15:34

## 2018-10-26 NOTE — PDOC3
Discharge Summary [facility]


Date of Admission:  Oct 22, 2018


Discharge Date:  Oct 26, 2018


Admitting Diagnosis


defibrillator discharged


Final Diagnosis


Problems


Medical Problems:


(1) Defibrillator discharge


Status: Acute  








CONSULTS


Trevin


Brief Hospital Course


Mr. Paul  is a 83 old who presented with


(1) Defibrillator discharge and noted to have arrhythmia in ER, mildly elevated 

troponin and pro BNP, he was started on Rythmol 150 mg bid and symptoms have 

resolved, he has previously been on Mexiletine, Flecainide and Dig for ischemic 

cardiomyopathy


(2) SSS - continue to monitor and adjust meds, cardiology followed, pace 

baseline reset from 40 bpm to 60


(3) weakness - PT/OT- evaluated and he is requesting HH services as he feels 

his legs are still deconditioned


(4) hypomagnesemia - replaced IV and po and does doubled for discharge from 

admission





Patient History:  


FH: cardiovascular disease


  G8 BROTHER


  33 FATHER


  32 MOTHER


  G8 SISTER


FH: diabetes mellitus


  G8 BROTHER


  33 FATHER


  32 MOTHER


  G8 SISTER


FH: sudden cardiac death (SCD)


  G8 BROTHER


  33 FATHER


  G8 SISTER


Disposition


home with wife, HH eval and tx PT/OT


CONDITION AT DISCHARGE:  Improved, Stable


Diet


cardiac


Scheduled


Acetaminophen/Diphenhydramine (Acetadryl 500-25 Mg Caplet), 1 TAB HS, (Reported)


Aspirin (Aspir-Vilma), 1 TAB PO DAILY16, (Reported)


Atorvastatin Calcium (Atorvastatin Calcium), 1 TAB DAILY16, (Reported)


Fenofibrate (Fenofibrate), 1 TAB DAILY, (Reported)


Magnesium Oxide (Magnesium), 250 MG PO DAILY, (Reported)


Magnesium Oxide (Mag-Oxide), 400 MG PO BID


Metformin Hcl (Metformin Hcl), 1 TAB BIDBFRMEAL, (Reported)


Multivits-Min/Fa/Lycopene/Lut (Centrum Silver Tablet), 1 TAB DAILY, (Reported)


Propafenone Hcl (Propafenone Hcl), 150 MG PO BID


[melatonin], 10 MG PO QHS, (Reported)


Follow Up


1-2 weeks with Jace Thrasher and FLORY Covarrubias MD Oct 26, 2018 17:24

## 2018-11-03 ENCOUNTER — HOSPITAL ENCOUNTER (INPATIENT)
Dept: HOSPITAL 61 - ER | Age: 83
LOS: 3 days | Discharge: HOME | DRG: 309 | End: 2018-11-06
Attending: FAMILY MEDICINE | Admitting: FAMILY MEDICINE
Payer: MEDICARE

## 2018-11-03 VITALS — SYSTOLIC BLOOD PRESSURE: 138 MMHG | DIASTOLIC BLOOD PRESSURE: 72 MMHG

## 2018-11-03 VITALS — SYSTOLIC BLOOD PRESSURE: 125 MMHG | DIASTOLIC BLOOD PRESSURE: 67 MMHG

## 2018-11-03 VITALS — DIASTOLIC BLOOD PRESSURE: 62 MMHG | SYSTOLIC BLOOD PRESSURE: 117 MMHG

## 2018-11-03 VITALS — SYSTOLIC BLOOD PRESSURE: 138 MMHG | DIASTOLIC BLOOD PRESSURE: 75 MMHG

## 2018-11-03 VITALS — SYSTOLIC BLOOD PRESSURE: 124 MMHG | DIASTOLIC BLOOD PRESSURE: 63 MMHG

## 2018-11-03 VITALS — BODY MASS INDEX: 18.48 KG/M2 | HEIGHT: 66 IN | WEIGHT: 115 LBS

## 2018-11-03 VITALS — DIASTOLIC BLOOD PRESSURE: 60 MMHG | SYSTOLIC BLOOD PRESSURE: 129 MMHG

## 2018-11-03 VITALS — DIASTOLIC BLOOD PRESSURE: 68 MMHG | SYSTOLIC BLOOD PRESSURE: 134 MMHG

## 2018-11-03 VITALS — SYSTOLIC BLOOD PRESSURE: 129 MMHG | DIASTOLIC BLOOD PRESSURE: 56 MMHG

## 2018-11-03 DIAGNOSIS — I12.9: ICD-10-CM

## 2018-11-03 DIAGNOSIS — K21.9: ICD-10-CM

## 2018-11-03 DIAGNOSIS — E11.22: ICD-10-CM

## 2018-11-03 DIAGNOSIS — Z79.899: ICD-10-CM

## 2018-11-03 DIAGNOSIS — J44.9: ICD-10-CM

## 2018-11-03 DIAGNOSIS — I47.2: Primary | ICD-10-CM

## 2018-11-03 DIAGNOSIS — Z79.84: ICD-10-CM

## 2018-11-03 DIAGNOSIS — I95.1: ICD-10-CM

## 2018-11-03 DIAGNOSIS — Z79.82: ICD-10-CM

## 2018-11-03 DIAGNOSIS — N18.3: ICD-10-CM

## 2018-11-03 DIAGNOSIS — Z85.840: ICD-10-CM

## 2018-11-03 DIAGNOSIS — Z95.1: ICD-10-CM

## 2018-11-03 DIAGNOSIS — M89.9: ICD-10-CM

## 2018-11-03 DIAGNOSIS — E44.0: ICD-10-CM

## 2018-11-03 DIAGNOSIS — N40.0: ICD-10-CM

## 2018-11-03 DIAGNOSIS — Z83.3: ICD-10-CM

## 2018-11-03 DIAGNOSIS — M48.061: ICD-10-CM

## 2018-11-03 DIAGNOSIS — Z95.810: ICD-10-CM

## 2018-11-03 DIAGNOSIS — I25.5: ICD-10-CM

## 2018-11-03 DIAGNOSIS — F41.9: ICD-10-CM

## 2018-11-03 DIAGNOSIS — Z95.5: ICD-10-CM

## 2018-11-03 DIAGNOSIS — Z85.46: ICD-10-CM

## 2018-11-03 DIAGNOSIS — E87.3: ICD-10-CM

## 2018-11-03 DIAGNOSIS — E78.00: ICD-10-CM

## 2018-11-03 DIAGNOSIS — Z92.3: ICD-10-CM

## 2018-11-03 DIAGNOSIS — Z85.820: ICD-10-CM

## 2018-11-03 DIAGNOSIS — I48.91: ICD-10-CM

## 2018-11-03 DIAGNOSIS — I25.10: ICD-10-CM

## 2018-11-03 DIAGNOSIS — I49.3: ICD-10-CM

## 2018-11-03 DIAGNOSIS — E83.42: ICD-10-CM

## 2018-11-03 DIAGNOSIS — Z82.49: ICD-10-CM

## 2018-11-03 LAB
ALBUMIN SERPL-MCNC: 3.6 G/DL (ref 3.4–5)
ALBUMIN/GLOB SERPL: 1 {RATIO} (ref 1–1.7)
ALP SERPL-CCNC: 38 U/L (ref 46–116)
ALT SERPL-CCNC: 18 U/L (ref 16–63)
AMORPH SED URNS QL MICRO: PRESENT /HPF
ANION GAP SERPL CALC-SCNC: 7 MMOL/L (ref 6–14)
APTT PPP: YELLOW S
AST SERPL-CCNC: 15 U/L (ref 15–37)
BACTERIA #/AREA URNS HPF: 0 /HPF
BASOPHILS # BLD AUTO: 0 X10^3/UL (ref 0–0.2)
BASOPHILS NFR BLD: 1 % (ref 0–3)
BILIRUB SERPL-MCNC: 0.3 MG/DL (ref 0.2–1)
BILIRUB UR QL STRIP: NEGATIVE
BUN SERPL-MCNC: 22 MG/DL (ref 8–26)
BUN/CREAT SERPL: 17 (ref 6–20)
CALCIUM SERPL-MCNC: 9.7 MG/DL (ref 8.5–10.1)
CHLORIDE SERPL-SCNC: 108 MMOL/L (ref 98–107)
CK SERPL-CCNC: 24 U/L (ref 39–308)
CK SERPL-CCNC: 24 U/L (ref 39–308)
CO2 SERPL-SCNC: 31 MMOL/L (ref 21–32)
CREAT SERPL-MCNC: 1.3 MG/DL (ref 0.7–1.3)
EOSINOPHIL NFR BLD: 0.3 X10^3/UL (ref 0–0.7)
EOSINOPHIL NFR BLD: 4 % (ref 0–3)
ERYTHROCYTE [DISTWIDTH] IN BLOOD BY AUTOMATED COUNT: 13.9 % (ref 11.5–14.5)
FIBRINOGEN PPP-MCNC: (no result) MG/DL
GFR SERPLBLD BASED ON 1.73 SQ M-ARVRAT: 53 ML/MIN
GLOBULIN SER-MCNC: 3.6 G/DL (ref 2.2–3.8)
GLUCOSE SERPL-MCNC: 83 MG/DL (ref 70–99)
HCT VFR BLD CALC: 35.5 % (ref 39–53)
HGB BLD-MCNC: 11.7 G/DL (ref 13–17.5)
LYMPHOCYTES # BLD: 0.8 X10^3/UL (ref 1–4.8)
LYMPHOCYTES NFR BLD AUTO: 12 % (ref 24–48)
MAGNESIUM SERPL-MCNC: 1.7 MG/DL (ref 1.8–2.4)
MCH RBC QN AUTO: 29 PG (ref 25–35)
MCHC RBC AUTO-ENTMCNC: 33 G/DL (ref 31–37)
MCV RBC AUTO: 87 FL (ref 79–100)
MONO #: 0.4 X10^3/UL (ref 0–1.1)
MONOCYTES NFR BLD: 6 % (ref 0–9)
NEUT #: 5.3 X10^3UL (ref 1.8–7.7)
NEUTROPHILS NFR BLD AUTO: 77 % (ref 31–73)
NITRITE UR QL STRIP: NEGATIVE
PH UR STRIP: 7.5 [PH]
PLATELET # BLD AUTO: 255 X10^3/UL (ref 140–400)
POTASSIUM SERPL-SCNC: 4 MMOL/L (ref 3.5–5.1)
PROT SERPL-MCNC: 7.2 G/DL (ref 6.4–8.2)
PROT UR STRIP-MCNC: NEGATIVE MG/DL
PROTHROMBIN TIME: 14 SEC (ref 11.7–14)
RBC # BLD AUTO: 4.09 X10^6/UL (ref 4.3–5.7)
RBC #/AREA URNS HPF: 0 /HPF (ref 0–2)
SODIUM SERPL-SCNC: 146 MMOL/L (ref 136–145)
UROBILINOGEN UR-MCNC: 1 MG/DL
WBC # BLD AUTO: 6.9 X10^3/UL (ref 4–11)
WBC #/AREA URNS HPF: 0 /HPF (ref 0–4)

## 2018-11-03 PROCEDURE — 84484 ASSAY OF TROPONIN QUANT: CPT

## 2018-11-03 PROCEDURE — 74176 CT ABD & PELVIS W/O CONTRAST: CPT

## 2018-11-03 PROCEDURE — 82553 CREATINE MB FRACTION: CPT

## 2018-11-03 PROCEDURE — 88305 TISSUE EXAM BY PATHOLOGIST: CPT

## 2018-11-03 PROCEDURE — 82550 ASSAY OF CK (CPK): CPT

## 2018-11-03 PROCEDURE — A9503 TC99M MEDRONATE: HCPCS

## 2018-11-03 PROCEDURE — 99152 MOD SED SAME PHYS/QHP 5/>YRS: CPT

## 2018-11-03 PROCEDURE — 84133 ASSAY OF URINE POTASSIUM: CPT

## 2018-11-03 PROCEDURE — 83880 ASSAY OF NATRIURETIC PEPTIDE: CPT

## 2018-11-03 PROCEDURE — 96366 THER/PROPH/DIAG IV INF ADDON: CPT

## 2018-11-03 PROCEDURE — 96374 THER/PROPH/DIAG INJ IV PUSH: CPT

## 2018-11-03 PROCEDURE — 84481 FREE ASSAY (FT-3): CPT

## 2018-11-03 PROCEDURE — 99153 MOD SED SAME PHYS/QHP EA: CPT

## 2018-11-03 PROCEDURE — 71250 CT THORAX DX C-: CPT

## 2018-11-03 PROCEDURE — 84439 ASSAY OF FREE THYROXINE: CPT

## 2018-11-03 PROCEDURE — G0103 PSA SCREENING: HCPCS

## 2018-11-03 PROCEDURE — 93005 ELECTROCARDIOGRAM TRACING: CPT

## 2018-11-03 PROCEDURE — 85610 PROTHROMBIN TIME: CPT

## 2018-11-03 PROCEDURE — 81001 URINALYSIS AUTO W/SCOPE: CPT

## 2018-11-03 PROCEDURE — 82436 ASSAY OF URINE CHLORIDE: CPT

## 2018-11-03 PROCEDURE — 84300 ASSAY OF URINE SODIUM: CPT

## 2018-11-03 PROCEDURE — 96365 THER/PROPH/DIAG IV INF INIT: CPT

## 2018-11-03 PROCEDURE — 36415 COLL VENOUS BLD VENIPUNCTURE: CPT

## 2018-11-03 PROCEDURE — 83735 ASSAY OF MAGNESIUM: CPT

## 2018-11-03 PROCEDURE — 84443 ASSAY THYROID STIM HORMONE: CPT

## 2018-11-03 PROCEDURE — 84100 ASSAY OF PHOSPHORUS: CPT

## 2018-11-03 PROCEDURE — 80048 BASIC METABOLIC PNL TOTAL CA: CPT

## 2018-11-03 PROCEDURE — 82962 GLUCOSE BLOOD TEST: CPT

## 2018-11-03 PROCEDURE — 78306 BONE IMAGING WHOLE BODY: CPT

## 2018-11-03 PROCEDURE — 80053 COMPREHEN METABOLIC PANEL: CPT

## 2018-11-03 PROCEDURE — 85025 COMPLETE CBC W/AUTO DIFF WBC: CPT

## 2018-11-03 PROCEDURE — 20220 BONE BIOPSY TROCAR/NDL SUPFC: CPT

## 2018-11-03 PROCEDURE — 77012 CT SCAN FOR NEEDLE BIOPSY: CPT

## 2018-11-03 PROCEDURE — 71045 X-RAY EXAM CHEST 1 VIEW: CPT

## 2018-11-03 RX ADMIN — ASPIRIN SCH MG: 325 TABLET, DELAYED RELEASE ORAL at 16:00

## 2018-11-03 RX ADMIN — ACETAMINOPHEN SCH MG: 500 TABLET ORAL at 20:39

## 2018-11-03 RX ADMIN — MULTIPLE VITAMINS W/ MINERALS TAB SCH TAB: TAB at 17:00

## 2018-11-03 RX ADMIN — ASPIRIN SCH MG: 325 TABLET, DELAYED RELEASE ORAL at 17:33

## 2018-11-03 RX ADMIN — MAGNESIUM OXIDE TAB 400 MG (241.3 MG ELEMENTAL MG) SCH MG: 400 (241.3 MG) TAB at 20:39

## 2018-11-03 RX ADMIN — ATORVASTATIN CALCIUM SCH MG: 20 TABLET, FILM COATED ORAL at 20:39

## 2018-11-03 NOTE — PDOC2
CONSULT


Date of Consult


Date of Consult


DATE: 11/3/18 


TIME: 13:29





Reason for Consult


Reason for Consult:


Ventricular tachycardia





Referring Physician


Referring Physician:


Dr. Hou





Identification/Chief Complaint


Chief Complaint


Ventricular tachycardia





History of Present Illness


Reason for Visit:


This patient is an 83-year-old gentleman with a long cardiac history that has a 

cardiomyopathy and known coronary artery disease. He is status post CABG and 

status post AICD pacemaker.





Recently the patient's overall condition has been deteriorating, he has been 

loosing weight and has had some issues with memory as well as some confusion 

from time to time.





The patient had been admitted because of an episode of ventricular tachycardia 

that caused his AICD to go off. He was tried on different antiarrhythmics. He 

has been on sotalol, flecainide, and other beta blockers.





Today he was at home when he is AICD went off again and the patient was brought 

to the emergency room. After arrival in the ER he had the AICD interrogated by 

[facility] and it was reported that the patient had multiple episodes of 

ventricular tachycardia and had been shocked. Presently the patient is awake 

and alert and responsive and in sinus rhythm.





Patient denies having any palpitations or chest pains at this time and while at 

rest he is not having any dyspnea.





He states that he has also continued to lose some weight even though he eats a 

lot.





The patient has a known history of diabetes as well as a cancer of the prostate 

in the past.





Past Medical History


Cardiovascular:  AFIB, CAD, HTN, Other


Pulmonary:  Bronchitis, COPD


GI:  GERD


Heme/Onc:  Cancer


Hepatobiliary:  No pertinent hx


Psych:  No pertinent hx, Other


Rheumatologic:  Other


Infectious disease:  No pertinent hx


Renal/:  Chronic renal insuff, Prostate Ca.


Endocrine:  Diabetes





Past Surgical History


Past Surgical History:  Pacemaker, CABG, Other





Family History


Family History:  Diabetes, Heart Disease





Social History


ALCOHOL:  none


Drugs:  None





Current Medications


Current Medications





Current Medications


Magnesium Sulfate 50 ml @ 25 mls/hr 1X  ONCE IV  Last administered on 11/3/18at 

11:00;  Start 11/3/18 at 11:00;  Stop 11/3/18 at 12:59;  Status DC


Amiodarone HCl 150 mg/Dextrose 103 ml @  600 mls/hr 1X  ONCE IV ;  Start 11/3/

18 at 14:00;  Stop 11/3/18 at 14:10


Amiodarone HCl 900 mg/Dextrose 518 ml @  33.33 mls/ hr CONT  PRN IV SEE I/O 

RECORD;  Start 11/3/18 at 14:30


Amiodarone HCl 900 mg/Dextrose 518 ml @  16.67 mls/ hr CONT  PRN IV SEE I/O 

RECORD;  Start 11/3/18 at 13:15;  Stop 11/4/18 at 07:14





Active Scripts


Active


Mag-Oxide (Magnesium Oxide) 400 Mg Tablet 400 Mg PO BID 30 Days


Propafenone Hcl 150 Mg Tablet 150 Mg PO BID 30 Days


Reported


[melatonin]   10 Mg PO QHS


Centrum Silver Tablet (Multivits-Min/Fa/Lycopene/Lut) 1 Each Tablet 1 Tab  DAILY


Acetadryl 500-25 Mg Caplet (Acetaminophen/Diphenhydramine) 1 Each Tablet 1 Tab  

HS


Aspir-Vilma (Aspirin) 325 Mg Tablet.dr 1 Tab PO DAILY16


Metformin Hcl 500 Mg Tablet 1 Tab  BIDBFRMEAL


Atorvastatin Calcium 20 Mg Tablet 1 Tab  DAILY16





Allergies


Allergies:  


Coded Allergies:  


     Milk Containing Products (Verified  Allergy, Unknown, 10/22/18)





Physical Exam


General:  Alert, Oriented X3, Cooperative


HEENT:  PERRLA, Mucous membr. moist/pink


Lungs:  Clear to auscultation


Heart:  Regular rate, Normal S1, Normal S2


Abdomen:  Normal bowel sounds, Soft


Extremities:  No edema


Psych/Mental Status:  Mental status NL





Vitals


VITALS





Vital Signs








  Date Time  Temp Pulse Resp B/P (MAP) Pulse Ox O2 Delivery O2 Flow Rate FiO2


 


11/3/18 13:20 97.5 86 16 138/75 (96) 98 Room Air  





 97.5       











Labs


Labs





Laboratory Tests








Test


 11/3/18


09:50 11/3/18


09:59 11/3/18


12:33


 


White Blood Count


 6.9 x10^3/uL


(4.0-11.0) 


 





 


Red Blood Count


 4.09 x10^6/uL


(4.30-5.70) 


 





 


Hemoglobin


 11.7 g/dL


(13.0-17.5) 


 





 


Hematocrit


 35.5 %


(39.0-53.0) 


 





 


Mean Corpuscular Volume 87 fL ()   


 


Mean Corpuscular Hemoglobin 29 pg (25-35)   


 


Mean Corpuscular Hemoglobin


Concent 33 g/dL


(31-37) 


 





 


Red Cell Distribution Width


 13.9 %


(11.5-14.5) 


 





 


Platelet Count


 255 x10^3/uL


(140-400) 


 





 


Neutrophils (%) (Auto) 77 % (31-73)   


 


Lymphocytes (%) (Auto) 12 % (24-48)   


 


Monocytes (%) (Auto) 6 % (0-9)   


 


Eosinophils (%) (Auto) 4 % (0-3)   


 


Basophils (%) (Auto) 1 % (0-3)   


 


Neutrophils # (Auto)


 5.3 x10^3uL


(1.8-7.7) 


 





 


Lymphocytes # (Auto)


 0.8 x10^3/uL


(1.0-4.8) 


 





 


Monocytes # (Auto)


 0.4 x10^3/uL


(0.0-1.1) 


 





 


Eosinophils # (Auto)


 0.3 x10^3/uL


(0.0-0.7) 


 





 


Basophils # (Auto)


 0.0 x10^3/uL


(0.0-0.2) 


 





 


Prothrombin Time


 14.0 SEC


(11.7-14.0) 


 





 


Prothromb Time International


Ratio 1.1 (0.8-1.1) 


 


 





 


Sodium Level


 146 mmol/L


(136-145) 


 





 


Potassium Level


 4.0 mmol/L


(3.5-5.1) 


 





 


Chloride Level


 108 mmol/L


() 


 





 


Carbon Dioxide Level


 31 mmol/L


(21-32) 


 





 


Anion Gap 7 (6-14)   


 


Blood Urea Nitrogen


 22 mg/dL


(8-26) 


 





 


Creatinine


 1.3 mg/dL


(0.7-1.3) 


 





 


Estimated GFR


(Cockcroft-Gault) 53.0 


 


 





 


BUN/Creatinine Ratio 17 (6-20)   


 


Glucose Level


 83 mg/dL


(70-99) 


 





 


Calcium Level


 9.7 mg/dL


(8.5-10.1) 


 





 


Magnesium Level


 1.7 mg/dL


(1.8-2.4) 


 





 


Total Bilirubin


 0.3 mg/dL


(0.2-1.0) 


 





 


Aspartate Amino Transf


(AST/SGOT) 15 U/L (15-37) 


 


 





 


Alanine Aminotransferase


(ALT/SGPT) 18 U/L (16-63) 


 


 





 


Alkaline Phosphatase


 38 U/L


() 


 





 


Creatine Kinase


 24 U/L


() 


 





 


Creatine Kinase MB (Mass)


 < 0.5 ng/mL


(0.0-3.6) 


 





 


Creatine Kinase MB Relative


Index  % (0-4) 


 


 





 


Troponin I Quantitative


 < 0.017 ng/mL


(0.000-0.055) 


 





 


NT-Pro-B-Type Natriuretic


Peptide 842 pg/mL


(0-449) 


 





 


Total Protein


 7.2 g/dL


(6.4-8.2) 


 





 


Albumin


 3.6 g/dL


(3.4-5.0) 


 





 


Albumin/Globulin Ratio 1.0 (1.0-1.7)   


 


Glucose (Fingerstick)


 


 89 mg/dL


(70-99) 





 


Urine Collection Type   Void 


 


Urine Color   Yellow 


 


Urine Clarity   Turbid 


 


Urine pH   7.5 


 


Urine Specific Gravity   1.020 


 


Urine Protein


 


 


 Negative mg/dL


(NEG-TRACE)


 


Urine Glucose (UA)


 


 


 Negative mg/dL


(NEG)


 


Urine Ketones (Stick)


 


 


 Negative mg/dL


(NEG)


 


Urine Blood   Negative (NEG) 


 


Urine Nitrite   Negative (NEG) 


 


Urine Bilirubin   Negative (NEG) 


 


Urine Urobilinogen Dipstick


 


 


 1.0 mg/dL (0.2


mg/dL)


 


Urine Leukocyte Esterase   Negative (NEG) 


 


Urine RBC   0 /HPF (0-2) 


 


Urine WBC   0 /HPF (0-4) 


 


Urine Amorphous Sediment   Present /HPF 


 


Urine Bacteria   0 /HPF (0-FEW) 








Laboratory Tests








Test


 11/3/18


09:50 11/3/18


09:59 11/3/18


12:33


 


White Blood Count


 6.9 x10^3/uL


(4.0-11.0) 


 





 


Red Blood Count


 4.09 x10^6/uL


(4.30-5.70) 


 





 


Hemoglobin


 11.7 g/dL


(13.0-17.5) 


 





 


Hematocrit


 35.5 %


(39.0-53.0) 


 





 


Mean Corpuscular Volume 87 fL ()   


 


Mean Corpuscular Hemoglobin 29 pg (25-35)   


 


Mean Corpuscular Hemoglobin


Concent 33 g/dL


(31-37) 


 





 


Red Cell Distribution Width


 13.9 %


(11.5-14.5) 


 





 


Platelet Count


 255 x10^3/uL


(140-400) 


 





 


Neutrophils (%) (Auto) 77 % (31-73)   


 


Lymphocytes (%) (Auto) 12 % (24-48)   


 


Monocytes (%) (Auto) 6 % (0-9)   


 


Eosinophils (%) (Auto) 4 % (0-3)   


 


Basophils (%) (Auto) 1 % (0-3)   


 


Neutrophils # (Auto)


 5.3 x10^3uL


(1.8-7.7) 


 





 


Lymphocytes # (Auto)


 0.8 x10^3/uL


(1.0-4.8) 


 





 


Monocytes # (Auto)


 0.4 x10^3/uL


(0.0-1.1) 


 





 


Eosinophils # (Auto)


 0.3 x10^3/uL


(0.0-0.7) 


 





 


Basophils # (Auto)


 0.0 x10^3/uL


(0.0-0.2) 


 





 


Prothrombin Time


 14.0 SEC


(11.7-14.0) 


 





 


Prothromb Time International


Ratio 1.1 (0.8-1.1) 


 


 





 


Sodium Level


 146 mmol/L


(136-145) 


 





 


Potassium Level


 4.0 mmol/L


(3.5-5.1) 


 





 


Chloride Level


 108 mmol/L


() 


 





 


Carbon Dioxide Level


 31 mmol/L


(21-32) 


 





 


Anion Gap 7 (6-14)   


 


Blood Urea Nitrogen


 22 mg/dL


(8-26) 


 





 


Creatinine


 1.3 mg/dL


(0.7-1.3) 


 





 


Estimated GFR


(Cockcroft-Gault) 53.0 


 


 





 


BUN/Creatinine Ratio 17 (6-20)   


 


Glucose Level


 83 mg/dL


(70-99) 


 





 


Calcium Level


 9.7 mg/dL


(8.5-10.1) 


 





 


Magnesium Level


 1.7 mg/dL


(1.8-2.4) 


 





 


Total Bilirubin


 0.3 mg/dL


(0.2-1.0) 


 





 


Aspartate Amino Transf


(AST/SGOT) 15 U/L (15-37) 


 


 





 


Alanine Aminotransferase


(ALT/SGPT) 18 U/L (16-63) 


 


 





 


Alkaline Phosphatase


 38 U/L


() 


 





 


Creatine Kinase


 24 U/L


() 


 





 


Creatine Kinase MB (Mass)


 < 0.5 ng/mL


(0.0-3.6) 


 





 


Creatine Kinase MB Relative


Index  % (0-4) 


 


 





 


Troponin I Quantitative


 < 0.017 ng/mL


(0.000-0.055) 


 





 


NT-Pro-B-Type Natriuretic


Peptide 842 pg/mL


(0-449) 


 





 


Total Protein


 7.2 g/dL


(6.4-8.2) 


 





 


Albumin


 3.6 g/dL


(3.4-5.0) 


 





 


Albumin/Globulin Ratio 1.0 (1.0-1.7)   


 


Glucose (Fingerstick)


 


 89 mg/dL


(70-99) 





 


Urine Collection Type   Void 


 


Urine Color   Yellow 


 


Urine Clarity   Turbid 


 


Urine pH   7.5 


 


Urine Specific Gravity   1.020 


 


Urine Protein


 


 


 Negative mg/dL


(NEG-TRACE)


 


Urine Glucose (UA)


 


 


 Negative mg/dL


(NEG)


 


Urine Ketones (Stick)


 


 


 Negative mg/dL


(NEG)


 


Urine Blood   Negative (NEG) 


 


Urine Nitrite   Negative (NEG) 


 


Urine Bilirubin   Negative (NEG) 


 


Urine Urobilinogen Dipstick


 


 


 1.0 mg/dL (0.2


mg/dL)


 


Urine Leukocyte Esterase   Negative (NEG) 


 


Urine RBC   0 /HPF (0-2) 


 


Urine WBC   0 /HPF (0-4) 


 


Urine Amorphous Sediment   Present /HPF 


 


Urine Bacteria   0 /HPF (0-FEW) 











Assessment/Plan


Assessment/Plan


This patient comes in after an episode of ventricular tachycardia. He has an 

ischemic cardiomyopathy. Will start him on IV amiodarone today and then 

tomorrow will start the by mouth loading of the amiodarone. The flecainide does 

not seem to be controlling the rhythm even after increasing the dose.





I'm concerned with the patient's loss of weight for no apparent reason 

therefore I would like to get a CT of the chest abdomen and pelvis without 

contrast and also because of the history of the prostate cancer get a bone 

scan. Will check a PSA.





I would like to also check other labs including a thyroid profile, magnesium 

level, calcium level, after multiple shocks it's a waste of time to check more 

troponins.





We will give the patient IV magnesium today.





Thank you very much for asking me to participate in the care of this patient.











OSWALD COE MD Nov 3, 2018 13:38

## 2018-11-03 NOTE — PHYS DOC
Past Medical History


Past Medical History:  Diabetes-Type II, Heart Disease, Hypertension


Additional Past Medical Histor:  Eye Cancer, Prostate Cancer


Past Surgical History:  Angioplasty, Coronary Bypass Surgery, Pacemaker


Additional Past Surgical Histo:  Prostatectomy, Left Eye Surgery, Defib 

Placement, CARDIAC STENTS


Alcohol Use:  Occasionally


Drug Use:  None





Adult General


Chief Complaint


Chief Complaint:  NEAR SYNCOPE





HPI


HPI





Patient is a 83  year old chest pressure, generalized weakness, felt like his 

AICD came on this morning.  Patient was admitted here recently for the same 

problem. His AICD was adjusted.  He denies any nausea, vomiting.  He felt weak, 

felt like he may pass out.





Review of Systems


Review of Systems





Constitutional: Denies fever or chills []


Eyes: Denies change in visual acuity, redness, or eye pain []


HENT: Denies nasal congestion or sore throat []


Respiratory: Denies cough or shortness of breath []


Cardiovascular: No additional information not addressed in HPI []


GI: Denies abdominal pain, nausea, vomiting, bloody stools or diarrhea []


: Denies dysuria or hematuria []


Musculoskeletal: Denies back pain or joint pain []


Integument: Denies rash or skin lesions []


Neurologic: Denies headache, focal weakness or sensory changes []


Endocrine: Denies polyuria or polydipsia []





All other systems were reviewed and found to be within normal limits, except as 

documented in this note.





Current Medications


Current Medications





Current Medications








 Medications


  (Trade)  Dose


 Ordered  Sig/Pedro Pablo  Start Time


 Stop Time Status Last Admin


Dose Admin


 


 Magnesium Sulfate  50 ml @ 25


 mls/hr  1X  ONCE  11/3/18 11:00


 11/3/18 12:59  11/3/18 11:00


25 MLS/HR











Allergies


Allergies





Allergies








Coded Allergies Type Severity Reaction Last Updated Verified


 


  Milk Containing Products Allergy Unknown  10/22/18 Yes











Physical Exam


Physical Exam





Constitutional: Well developed, well nourished, no acute distress, non-toxic 

appearance. []


HENT: Normocephalic, atraumatic, bilateral external ears normal, oropharynx 

moist, no oral exudates, nose normal. []


Eyes:  EOMI, conjunctiva normal, no discharge. [] 


Neck: Normal range of motion, no tenderness, supple, no stridor. [] 


Cardiovascular:Heart rate regular rhythm, no murmur []


Lungs & Thorax:  Bilateral breath sounds clear to auscultation []


Abdomen: Bowel sounds normal, soft, no tenderness, no masses, no pulsatile 

masses. [] 


Skin: Warm, dry, no erythema, no rash. [] 


Back: No tenderness, no CVA tenderness. [] 


Extremities: No tenderness, no cyanosis, no clubbing, ROM intact, no edema. [] 


Neurologic: Alert and oriented X 3, normal motor function, normal sensory 

function, no focal deficits noted. []


Psychologic: Affect normal, judgement normal, mood normal. []





Current Patient Data


Vital Signs





 Vital Signs








  Date Time  Temp Pulse Resp B/P (MAP) Pulse Ox O2 Delivery O2 Flow Rate FiO2


 


11/3/18 11:23  72 18  97   


 


11/3/18 09:41 97.6   142/69 (93)  Room Air  





 97.6       








Lab Values





 Laboratory Tests








Test


 11/3/18


09:50 11/3/18


09:59


 


White Blood Count


 6.9 x10^3/uL


(4.0-11.0) 





 


Red Blood Count


 4.09 x10^6/uL


(4.30-5.70)  L 





 


Hemoglobin


 11.7 g/dL


(13.0-17.5)  L 





 


Hematocrit


 35.5 %


(39.0-53.0)  L 





 


Mean Corpuscular Volume


 87 fL ()


 





 


Mean Corpuscular Hemoglobin 29 pg (25-35)   


 


Mean Corpuscular Hemoglobin


Concent 33 g/dL


(31-37) 





 


Red Cell Distribution Width


 13.9 %


(11.5-14.5) 





 


Platelet Count


 255 x10^3/uL


(140-400) 





 


Neutrophils (%) (Auto) 77 % (31-73)  H 


 


Lymphocytes (%) (Auto) 12 % (24-48)  L 


 


Monocytes (%) (Auto) 6 % (0-9)   


 


Eosinophils (%) (Auto) 4 % (0-3)  H 


 


Basophils (%) (Auto) 1 % (0-3)   


 


Neutrophils # (Auto)


 5.3 x10^3uL


(1.8-7.7) 





 


Lymphocytes # (Auto)


 0.8 x10^3/uL


(1.0-4.8)  L 





 


Monocytes # (Auto)


 0.4 x10^3/uL


(0.0-1.1) 





 


Eosinophils # (Auto)


 0.3 x10^3/uL


(0.0-0.7) 





 


Basophils # (Auto)


 0.0 x10^3/uL


(0.0-0.2) 





 


Prothrombin Time


 14.0 SEC


(11.7-14.0) 





 


Prothrombin Time INR 1.1 (0.8-1.1)   


 


Sodium Level


 146 mmol/L


(136-145)  H 





 


Potassium Level


 4.0 mmol/L


(3.5-5.1) 





 


Chloride Level


 108 mmol/L


()  H 





 


Carbon Dioxide Level


 31 mmol/L


(21-32) 





 


Anion Gap 7 (6-14)   


 


Blood Urea Nitrogen


 22 mg/dL


(8-26) 





 


Creatinine


 1.3 mg/dL


(0.7-1.3) 





 


Estimated GFR


(Cockcroft-Gault) 53.0  


 





 


BUN/Creatinine Ratio 17 (6-20)   


 


Glucose Level


 83 mg/dL


(70-99) 





 


Calcium Level


 9.7 mg/dL


(8.5-10.1) 





 


Magnesium Level


 1.7 mg/dL


(1.8-2.4)  L 





 


Total Bilirubin


 0.3 mg/dL


(0.2-1.0) 





 


Aspartate Amino Transferase


(AST) 15 U/L (15-37)


 





 


Alanine Aminotransferase (ALT)


 18 U/L (16-63)


 





 


Alkaline Phosphatase


 38 U/L


()  L 





 


Creatine Kinase


 24 U/L


()  L 





 


Creatine Kinase MB (Mass)


 < 0.5 ng/mL


(0.0-3.6) 





 


Creatine Kinase MB Relative


Index  % (0-4)  


 





 


Troponin I Quantitative


 < 0.017 ng/mL


(0.000-0.055) 





 


NT-Pro-B-Type Natriuretic


Peptide 842 pg/mL


(0-449)  H 





 


Total Protein


 7.2 g/dL


(6.4-8.2) 





 


Albumin


 3.6 g/dL


(3.4-5.0) 





 


Albumin/Globulin Ratio 1.0 (1.0-1.7)   


 


Glucose (Fingerstick)


 


 89 mg/dL


(70-99)





 Laboratory Tests


11/3/18 09:50








 Laboratory Tests


11/3/18 09:50














EKG


EKG


EKG RATE OF 87 BPM, RBBB, AFIB


NO STEMI. []





Radiology/Procedures


Radiology/Procedures


[]





Course & Med Decision Making


Course & Med Decision Making


Pertinent Labs and Imaging studies reviewed. (See chart for details)





[]





Dragon Disclaimer


Dragon Disclaimer


This electronic medical record was generated, in whole or in part, using a 

voice recognition dictation system.





Departure


Departure


Impression:  


 Primary Impression:  


 Nonsustained paroxysmal ventricular tachycardia


Disposition:  09 ADMITTED AS INPATIENT


Admitting Physician:  MALI Hou


Condition:  STABLE


Referrals:  


DOLORES CALLEJAS MD (PCP)











MAGDALENE ZHANG DO Nov 3, 2018 11:50

## 2018-11-03 NOTE — HP
ADMIT DATE:  11/03/2018



ADMISSION DIAGNOSIS:  Ventricular tachycardia.



HISTORY OF PRESENT ILLNESS:  This is an 83-year-old white male with longstanding

heart disease who has an AICD and he felt like he was going to have a spell

where it might fire again.  That has recently happened and he was hospitalized

for it.  His medications were adjusted, but he is still having typically morning

symptoms when he wakes up in the morning.  This morning, he had a fullness in

his neck that went up into his jaw area and he felt quite lightheaded, but did

not have a syncopal episode and did not feel his defibrillator discharge.  He

did not feel his heart racing.  He says typically when he has symptoms like this

in the morning, it is because he has had several episodes of nocturia overnight.

 He has a history of prostate cancer.  He also has a history of low magnesium

and his magnesium again this visit is low.  He has been seen in the Emergency

Room without any findings of an acute cardiac event and is being admitted.  Dr. Zhong plans to start him on amiodarone.



PAST MEDICAL HISTORY:  Type 2 diabetes, heart disease, hypertension, prostate

cancer, eye cancer.



PAST SURGICAL HISTORY:  Include angioplasty, coronary artery bypass, pacemaker

placement, AICD placement, prostatectomy, left eye surgery.



ALLERGIES:  TO MILK CONTAINING PRODUCTS.



HOME MEDICATIONS:  Propafenone 150 mg b.i.d., atorvastatin 20 mg daily, aspirin

325 daily, acetaminophen/diphenhydramine 500-25 one at bedtime, magnesium oxide

2 tabs 400 mg daily, metformin 500 mg b.i.d. with meals, a multivitamin daily

and melatonin at bedtime.



FAMILY HISTORY:  Noncontributory.



SOCIAL HISTORY:  He rarely drinks alcohol.  Does not smoke.  He is , wife

is present.



REVIEW OF SYSTEMS:

CONSTITUTIONAL:  No fever, chills or night sweats.

HEENT:  No change in his hearing, vision or taste.

CARDIOVASCULAR:  As above.

PULMONARY:  Negative for cough or shortness of breath.

GASTROINTESTINAL:  Negative for change in bowels, nausea or vomiting.

GENITOURINARY:  As above.

MUSCULOSKELETAL:  Generalized weakness.  He has had some slow but progressive

weight loss too.

SKIN:  No bleeding or bruising.

NEUROLOGIC:  Perhaps some memory loss.



PHYSICAL EXAMINATION:

VITAL SIGNS:  He is afebrile with room air oxygen saturation 98%, blood pressure

138/75, heart rate 86, respiratory rate of 16.

GENERAL:  He is in no acute distress.  He is lying comfortably on the bed.  He

is alert and oriented.  His wife is present.

HEENT:  Shows his conjunctiva to be clear.  Mucous membranes to be moist.

NECK:  Supple.  Unable to hear any bruits.

HEART:  Regular rate and rhythm.  AICD is nontender.

CHEST:  Nontender.  Lungs are clear anteriorly.

ABDOMEN:  Soft, nondistended, nontender.

EXTREMITIES:  Without clubbing or cyanosis.

SKIN:  Turgor is normal.   strength is normal.



LABORATORY DATA:  Mild anemia with hemoglobin 11.7, hematocrit 35.5, platelets

255, white count 6.9.  Coags show an INR of 1.1.  Sodium slightly high 4.6,

chloride is high at 108, potassium is normal at 4.  Creatinine is 1.3, which is

consistent with his baseline.  Magnesium is 1.7, which is low.  ProBNP is 842,

which is minimally elevated.  Creatinine kinase is normal.  Liver enzymes are

normal.  Urinalysis was yellow and turbid showing some amorphous sediment, but

no bacteria, white cells and is negative for nitrite, blood and leukocyte

esterase.



IMAGING STUDIES:  Chest x-ray shows clear lungs.  Cardiac silhouette is

unremarkable.  Left-sided cardiac pacer is in place.



ASSESSMENT AND PLAN:

1.  Nonsustained ventricular tachycardia.  He is admitted for management of his

medications.  Dr. Zhong will follow.  He has had some progressive weakness,

weight loss and he does have a history of prostate cancer.

2.  Hypomagnesemia.  This is chronic on and off, it is contributing to his

arrhythmias.  We will ask renal to see.

 



______________________________

W ROSAURA WALTON MD



DR:  HANNAH/brenda  JOB#:  [id number] / [id number]

DD:  11/03/2018 13:46  DT:  11/03/2018 14:35

## 2018-11-03 NOTE — RAD
EXAM: CHEST 1 VIEW 

 

History: Syncope 

 

COMPARISON: 10/22/2018

 

TECHNIQUE: Single portable radiograph of the chest

 

FINDINGS:  The cardiac silhouette is unremarkable. The lungs are clear 

bilaterally. The costophrenic sulci are clear and well demarcated. 

Left-sided cardiac pacer AICD is unchanged.

 

IMPRESSION:  No radiographic evidence of an acute cardiopulmonary process.

 

 

Electronically signed by: Nic Pang MD (11/3/2018 10:18 AM) [facility]

## 2018-11-03 NOTE — EKG
[facility]

              [address] [place] [address]

Test Date:    2018               Test Time:    09:32:31

Pat Name:     DORIAN WILSON           Department:   

Patient ID:   [id number]           Room:          

Gender:       M                        Technician:   

:          [id number]               Requested By: MAGDALENE ZHANG

Order Number: [id number]           Reading MD:   Ryne Olivares MD

                                 Measurements

Intervals                              Axis          

Rate:         86                       P:            

MI:                                    QRS:          99

QRSD:         136                      T:            9

QT:           368                                    

QTc:          443                                    

                           Interpretive Statements

SR

RBBB

NON-SPECIFIC ST/T CHANGES

Electronically Signed On 2018 14:44:18 CST by Ryne Olivares MD

## 2018-11-04 VITALS — DIASTOLIC BLOOD PRESSURE: 52 MMHG | SYSTOLIC BLOOD PRESSURE: 105 MMHG

## 2018-11-04 VITALS — SYSTOLIC BLOOD PRESSURE: 110 MMHG | DIASTOLIC BLOOD PRESSURE: 58 MMHG

## 2018-11-04 VITALS — DIASTOLIC BLOOD PRESSURE: 68 MMHG | SYSTOLIC BLOOD PRESSURE: 118 MMHG

## 2018-11-04 VITALS — DIASTOLIC BLOOD PRESSURE: 59 MMHG | SYSTOLIC BLOOD PRESSURE: 133 MMHG

## 2018-11-04 VITALS — DIASTOLIC BLOOD PRESSURE: 50 MMHG | SYSTOLIC BLOOD PRESSURE: 105 MMHG

## 2018-11-04 VITALS — SYSTOLIC BLOOD PRESSURE: 115 MMHG | DIASTOLIC BLOOD PRESSURE: 64 MMHG

## 2018-11-04 VITALS — DIASTOLIC BLOOD PRESSURE: 63 MMHG | SYSTOLIC BLOOD PRESSURE: 129 MMHG

## 2018-11-04 LAB
ALBUMIN SERPL-MCNC: 3 G/DL (ref 3.4–5)
ALBUMIN/GLOB SERPL: 1 {RATIO} (ref 1–1.7)
ALP SERPL-CCNC: 27 U/L (ref 46–116)
ALT SERPL-CCNC: 12 U/L (ref 16–63)
ANION GAP SERPL CALC-SCNC: 8 MMOL/L (ref 6–14)
AST SERPL-CCNC: 12 U/L (ref 15–37)
BASOPHILS # BLD AUTO: 0.1 X10^3/UL (ref 0–0.2)
BASOPHILS NFR BLD: 1 % (ref 0–3)
BILIRUB SERPL-MCNC: 0.3 MG/DL (ref 0.2–1)
BUN SERPL-MCNC: 21 MG/DL (ref 8–26)
BUN/CREAT SERPL: 18 (ref 6–20)
CALCIUM SERPL-MCNC: 9.2 MG/DL (ref 8.5–10.1)
CHLORIDE SERPL-SCNC: 108 MMOL/L (ref 98–107)
CO2 SERPL-SCNC: 28 MMOL/L (ref 21–32)
CREAT SERPL-MCNC: 1.2 MG/DL (ref 0.7–1.3)
EOSINOPHIL NFR BLD: 0.4 X10^3/UL (ref 0–0.7)
EOSINOPHIL NFR BLD: 6 % (ref 0–3)
ERYTHROCYTE [DISTWIDTH] IN BLOOD BY AUTOMATED COUNT: 14.2 % (ref 11.5–14.5)
GFR SERPLBLD BASED ON 1.73 SQ M-ARVRAT: 58.1 ML/MIN
GLOBULIN SER-MCNC: 3.1 G/DL (ref 2.2–3.8)
GLUCOSE SERPL-MCNC: 85 MG/DL (ref 70–99)
HCT VFR BLD CALC: 32.7 % (ref 39–53)
HGB BLD-MCNC: 10.9 G/DL (ref 13–17.5)
LYMPHOCYTES # BLD: 1.1 X10^3/UL (ref 1–4.8)
LYMPHOCYTES NFR BLD AUTO: 16 % (ref 24–48)
MAGNESIUM SERPL-MCNC: 1.9 MG/DL (ref 1.8–2.4)
MCH RBC QN AUTO: 29 PG (ref 25–35)
MCHC RBC AUTO-ENTMCNC: 34 G/DL (ref 31–37)
MCV RBC AUTO: 86 FL (ref 79–100)
MONO #: 0.5 X10^3/UL (ref 0–1.1)
MONOCYTES NFR BLD: 7 % (ref 0–9)
NEUT #: 4.8 X10^3UL (ref 1.8–7.7)
NEUTROPHILS NFR BLD AUTO: 70 % (ref 31–73)
PLATELET # BLD AUTO: 219 X10^3/UL (ref 140–400)
POTASSIUM SERPL-SCNC: 3.9 MMOL/L (ref 3.5–5.1)
PROT SERPL-MCNC: 6.1 G/DL (ref 6.4–8.2)
RBC # BLD AUTO: 3.8 X10^6/UL (ref 4.3–5.7)
SODIUM SERPL-SCNC: 144 MMOL/L (ref 136–145)
T4 FREE SERPL-MCNC: 1.07 NG/DL (ref 0.76–1.46)
THYROID STIM HORMONE (TSH): 1.07 UIU/ML (ref 0.36–3.74)
UR POTASSIUM: 35.4 MMOL/L
WBC # BLD AUTO: 6.8 X10^3/UL (ref 4–11)

## 2018-11-04 RX ADMIN — MAGNESIUM OXIDE TAB 400 MG (241.3 MG ELEMENTAL MG) SCH MG: 400 (241.3 MG) TAB at 21:17

## 2018-11-04 RX ADMIN — ASPIRIN SCH MG: 325 TABLET, DELAYED RELEASE ORAL at 16:35

## 2018-11-04 RX ADMIN — AMIODARONE HYDROCHLORIDE SCH MG: 200 TABLET ORAL at 21:16

## 2018-11-04 RX ADMIN — MULTIPLE VITAMINS W/ MINERALS TAB SCH TAB: TAB at 09:39

## 2018-11-04 RX ADMIN — ACETAMINOPHEN SCH MG: 500 TABLET ORAL at 21:16

## 2018-11-04 RX ADMIN — MAGNESIUM OXIDE TAB 400 MG (241.3 MG ELEMENTAL MG) SCH MG: 400 (241.3 MG) TAB at 09:40

## 2018-11-04 RX ADMIN — AMIODARONE HYDROCHLORIDE SCH MG: 200 TABLET ORAL at 09:39

## 2018-11-04 RX ADMIN — ATORVASTATIN CALCIUM SCH MG: 20 TABLET, FILM COATED ORAL at 21:16

## 2018-11-04 NOTE — RAD
Whole-body bone scan.

 

HISTORY: Staging prostate cancer

 

Whole body bone scan was done using 26.1 mCi technetium 99 MDP. There is a

CT chest abdomen pelvis for comparison. There is no prior bone scan. There

is normal renal and bladder activity. There is a focus of activity in the 

posterior lateral left seventh rib. There is a small blastic or sclerotic 

focus at that location without an obvious old fracture. A blastic 

metastatic lesion is possible. No other abnormal activity is noted. There 

is slight activity along the posterior costovertebral junctions on the 

left at several levels which typically is related to trauma although 

hypertrophic change would be possible. CT showed slight sclerosis could be

arthritis or old trauma or metastatic disease would be less likely.

 

IMPRESSION:

1. Small focus of activity posterior left seventh rib corresponding to a 

blastic or sclerotic lesion on the CT, possible bony metastatic disease, 

an old injury would be possible.

2. Slight activity at the costovertebral junctions on the left metastatic 

disease would be unusual in this pattern or old trauma or arthritis can 

have this pattern.

 

Electronically signed by: Navarro Pina MD (11/4/2018 2:17 PM) [facility]

## 2018-11-04 NOTE — PDOC
PROGRESS NOTES


Subjective


Tolerating amiodarone and his appetite has returned today and he feels much 

better, he did have urinary frequency overnight though but no chest pain, no SOA


Objective


Afebrile


General:  NAD


Heart:  RRR, monitor sinus with some PVCs and some paced beats


Lungs:  CTA


Abd:  non tender


Ext:  no clubbing or cyanosis


Vital Signs





Vital Signs








  Date Time  Temp Pulse Resp B/P (MAP) Pulse Ox O2 Delivery O2 Flow Rate FiO2


 


11/4/18 11:27 97.4 69 18 115/64 (81) 98 Room Air  





 97.4       








I & O











Intake and Output 


 


 11/4/18





 07:00


 


Intake Total 1100 ml


 


Output Total 925 ml


 


Balance 175 ml


 


 


 


Intake Oral 650 ml


 


IV Total 450 ml


 


Output Urine Total 925 ml








Assessment and Plan


arrhythmia - now on amiodarone, thyroid studies normal


hypomagnesemia - replacing, Renal consulted


weight loss - work up for malignancy











FLORY WALTON MD Nov 4, 2018 14:23

## 2018-11-04 NOTE — PDOC2
CONSULT


Date of Consult


Date of Consult


DATE: 11/4/18 


TIME: 10:40





Reason for Consult


Reason for Consult:


LOW MAG





Referring Physician


Referring Physician:








Identification/Chief Complaint


Chief Complaint


AICD WENT OFF





Source


Source:  Chart review, Patient





History of Present Illness


Reason for Visit:


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 CURRENTLY 

UNDERGOING EVALUATION FOR THIS WITH CARDIOLOGY. PT NOTED TO HAVE SOME 

CHRONICALLY LOW MAG LEVELS. HAS BEEN ON SUPPLEMENTS. HAS NOT EVER BEEN ON ANY 

DIURETICS PER PT. DENIES HAVING ANY LOW POTASSIUM PROBLEMS. NO CKD NOTED. NO CA 

ABNORMALITIES. LABS DID SHOW A TENDENCY TOWARDS MET ALKALOSIS. DENIED ANY 

DIARRHEA. NOT ON ANY H2 BLOCKERS OR PPI'S





Past Medical History


Cardiovascular:  AFIB, CAD, HTN, Other


Pulmonary:  Bronchitis, COPD


GI:  GERD


Heme/Onc:  Cancer


Hepatobiliary:  No pertinent hx


Psych:  No pertinent hx, Other


Rheumatologic:  Other


Infectious disease:  No pertinent hx


Renal/:  Chronic renal insuff, Prostate Ca.


Endocrine:  Diabetes





Past Surgical History


Past Surgical History:  Pacemaker, CABG, Other





Family History


Family History:  Diabetes, Heart Disease





Social History


ALCOHOL:  none


Drugs:  None





Current Medications


Current Medications





Current Medications


Magnesium Sulfate 50 ml @ 25 mls/hr 1X  ONCE IV  Last administered on 11/3/18at 

11:00;  Start 11/3/18 at 11:00;  Stop 11/3/18 at 12:59;  Status DC


Amiodarone HCl 150 mg/Dextrose 103 ml @  600 mls/hr 1X  ONCE IV  Last 

administered on 11/3/18at 13:49;  Start 11/3/18 at 14:00;  Stop 11/3/18 at 14:10

;  Status DC


Amiodarone HCl 900 mg/Dextrose 518 ml @  33.33 mls/ hr CONT  PRN IV SEE I/O 

RECORD Last administered on 11/3/18at 13:49;  Start 11/3/18 at 14:30;  Stop 11/3

/18 at 14:30;  Status DC


Amiodarone HCl 900 mg/Dextrose 518 ml @  16.67 mls/ hr CONT  PRN IV SEE I/O 

RECORD;  Start 11/3/18 at 13:15;  Stop 11/4/18 at 07:14;  Status DC


Aspirin (Ecotrin) 325 mg DAILY16 PO  Last administered on 11/3/18at 17:33;  

Start 11/3/18 at 16:00


Atorvastatin Calcium (Lipitor) 20 mg DAILY16 PO ;  Start 11/3/18 at 16:00;  

Stop 11/3/18 at 16:12;  Status DC


Acetaminophen (Tylenol) 500 mg QHS PO  Last administered on 11/3/18at 20:39;  

Start 11/3/18 at 21:00


Magnesium Oxide (Magnesium Oxide) 400 mg BID PO  Last administered on 11/4/18at 

09:40;  Start 11/3/18 at 21:00


Metformin HCl (Glucophage) 500 mg BIDWMEALS PO  Last administered on 11/4/18at 

09:39;  Start 11/3/18 at 17:00;  Stop 11/4/18 at 10:24;  Status DC


Multivitamins (Thera M Plus) 1 tab DAILY PO  Last administered on 11/4/18at 09:

39;  Start 11/3/18 at 17:00


Non-Formulary Medication ([melatonin] ) 10 mg QHS PO ;  Start 11/3/18 at 21:00;

  Status UNV


Magnesium Sulfate/ Dextrose 100 ml @  100 mls/hr 1X  ONCE IV ;  Start 11/3/18 

at 15:00;  Stop 11/3/18 at 15:43;  Status DC


Diphenhydramine HCl (Benadryl) 25 mg QHS PO  Last administered on 11/3/18at 20:

39;  Start 11/3/18 at 21:00


Atorvastatin Calcium (Lipitor) 20 mg QHS PO  Last administered on 11/3/18at 20:

39;  Start 11/3/18 at 21:00


Amiodarone HCl (Cordarone) 400 mg BID PO  Last administered on 11/4/18at 09:39;

  Start 11/4/18 at 09:00


Iohexol (Omnipaque 240 Mg/ml) 50 ml 1X  ONCE PO ;  Start 11/4/18 at 10:30;  

Stop 11/4/18 at 10:31;  Status DC


Info (CONTRAST GIVEN -- Rx MONITORING) 1 each PRN DAILY  PRN MC SEE COMMENTS;  

Start 11/4/18 at 10:30;  Stop 11/6/18 at 10:29


Metformin HCl (Glucophage) 500 mg BIDWMEALS PO ;  Start 11/6/18 at 08:00





Active Scripts


Active


Mag-Oxide (Magnesium Oxide) 400 Mg Tablet 400 Mg PO BID 30 Days


Propafenone Hcl 150 Mg Tablet 150 Mg PO BID 30 Days


Reported


[melatonin]   10 Mg PO QHS


Centrum Silver Tablet (Multivits-Min/Fa/Lycopene/Lut) 1 Each Tablet 1 Tab  DAILY


Acetadryl 500-25 Mg Caplet (Acetaminophen/Diphenhydramine) 1 Each Tablet 1 Tab  

HS


Aspir-Vilma (Aspirin) 325 Mg Tablet.dr 1 Tab PO DAILY16


Metformin Hcl 500 Mg Tablet 1 Tab  BIDBFRMEAL


Atorvastatin Calcium 20 Mg Tablet 1 Tab  DAILY16





Allergies


Allergies:  


Coded Allergies:  


     Milk Containing Products (Verified  Allergy, Unknown, 10/22/18)





ROS


General:  YES: Fatigue, Malaise


PSYCHOLOGICAL ROS:  YES: Depression


Eyes:  Yes Decreased vision


HEENT:  YES: Heacaches


Respiratory:  YES: Cough


Cardiovascular:  yes Palpitations


Gastrointestinal:  Yes Constipation


Genitourinary:  YES Other (NOCTURIA)


Musculoskeletal:  Yes Muscular Weakness


Neurological:  Yes Weakness


Skin:  Yes Dry Skin





Physical Exam


General:  Alert, Cooperative, No acute distress


HEENT:  Atraumatic, PERRLA, Mucous membr. moist/pink


Lungs:  Clear to auscultation


Heart:  Regular rate


Abdomen:  Normal bowel sounds, Soft, No tenderness


Skin:  No breakdown


Neuro:  Normal speech, Cranial nerves 3-12 NL


Psych/Mental Status:  Mental status NL, Mood NL


MUSCULOSKELETAL:  No deformity, No swelling





Vitals


VITALS





Vital Signs








  Date Time  Temp Pulse Resp B/P (MAP) Pulse Ox O2 Delivery O2 Flow Rate FiO2


 


11/4/18 09:39  66  133/59    


 


11/4/18 08:33      Room Air  


 


11/4/18 07:35 97.5  14  97   





 97.5       











Labs


Labs





Laboratory Tests








Test


 11/3/18


09:50 11/3/18


09:59 11/3/18


12:33 11/4/18


04:45


 


White Blood Count


 6.9 x10^3/uL


(4.0-11.0) 


 


 6.8 x10^3/uL


(4.0-11.0)


 


Red Blood Count


 4.09 x10^6/uL


(4.30-5.70) 


 


 3.80 x10^6/uL


(4.30-5.70)


 


Hemoglobin


 11.7 g/dL


(13.0-17.5) 


 


 10.9 g/dL


(13.0-17.5)


 


Hematocrit


 35.5 %


(39.0-53.0) 


 


 32.7 %


(39.0-53.0)


 


Mean Corpuscular Volume 87 fL ()    86 fL () 


 


Mean Corpuscular Hemoglobin 29 pg (25-35)    29 pg (25-35) 


 


Mean Corpuscular Hemoglobin


Concent 33 g/dL


(31-37) 


 


 34 g/dL


(31-37)


 


Red Cell Distribution Width


 13.9 %


(11.5-14.5) 


 


 14.2 %


(11.5-14.5)


 


Platelet Count


 255 x10^3/uL


(140-400) 


 


 219 x10^3/uL


(140-400)


 


Neutrophils (%) (Auto) 77 % (31-73)    70 % (31-73) 


 


Lymphocytes (%) (Auto) 12 % (24-48)    16 % (24-48) 


 


Monocytes (%) (Auto) 6 % (0-9)    7 % (0-9) 


 


Eosinophils (%) (Auto) 4 % (0-3)    6 % (0-3) 


 


Basophils (%) (Auto) 1 % (0-3)    1 % (0-3) 


 


Neutrophils # (Auto)


 5.3 x10^3uL


(1.8-7.7) 


 


 4.8 x10^3uL


(1.8-7.7)


 


Lymphocytes # (Auto)


 0.8 x10^3/uL


(1.0-4.8) 


 


 1.1 x10^3/uL


(1.0-4.8)


 


Monocytes # (Auto)


 0.4 x10^3/uL


(0.0-1.1) 


 


 0.5 x10^3/uL


(0.0-1.1)


 


Eosinophils # (Auto)


 0.3 x10^3/uL


(0.0-0.7) 


 


 0.4 x10^3/uL


(0.0-0.7)


 


Basophils # (Auto)


 0.0 x10^3/uL


(0.0-0.2) 


 


 0.1 x10^3/uL


(0.0-0.2)


 


Prothrombin Time


 14.0 SEC


(11.7-14.0) 


 


 





 


Prothromb Time International


Ratio 1.1 (0.8-1.1) 


 


 


 





 


Sodium Level


 146 mmol/L


(136-145) 


 


 144 mmol/L


(136-145)


 


Potassium Level


 4.0 mmol/L


(3.5-5.1) 


 


 3.9 mmol/L


(3.5-5.1)


 


Chloride Level


 108 mmol/L


() 


 


 108 mmol/L


()


 


Carbon Dioxide Level


 31 mmol/L


(21-32) 


 


 28 mmol/L


(21-32)


 


Anion Gap 7 (6-14)    8 (6-14) 


 


Blood Urea Nitrogen


 22 mg/dL


(8-26) 


 


 21 mg/dL


(8-26)


 


Creatinine


 1.3 mg/dL


(0.7-1.3) 


 


 1.2 mg/dL


(0.7-1.3)


 


Estimated GFR


(Cockcroft-Gault) 53.0 


 


 


 58.1 





 


BUN/Creatinine Ratio 17 (6-20)    18 (6-20) 


 


Glucose Level


 83 mg/dL


(70-99) 


 


 85 mg/dL


(70-99)


 


Calcium Level


 9.7 mg/dL


(8.5-10.1) 


 


 9.2 mg/dL


(8.5-10.1)


 


Magnesium Level


 1.7 mg/dL


(1.8-2.4) 


 


 1.9 mg/dL


(1.8-2.4)


 


Total Bilirubin


 0.3 mg/dL


(0.2-1.0) 


 


 0.3 mg/dL


(0.2-1.0)


 


Aspartate Amino Transf


(AST/SGOT) 15 U/L (15-37) 


 


 


 12 U/L (15-37) 





 


Alanine Aminotransferase


(ALT/SGPT) 18 U/L (16-63) 


 


 


 12 U/L (16-63) 





 


Alkaline Phosphatase


 38 U/L


() 


 


 27 U/L


()


 


Creatine Kinase


 24 U/L


() 


 


 





 


Creatine Kinase MB (Mass)


 < 0.5 ng/mL


(0.0-3.6) 


 


 





 


Creatine Kinase MB Relative


Index  % (0-4) 


 


 


 





 


Troponin I Quantitative


 < 0.017 ng/mL


(0.000-0.055) 


 


 





 


NT-Pro-B-Type Natriuretic


Peptide 842 pg/mL


(0-449) 


 


 





 


Total Protein


 7.2 g/dL


(6.4-8.2) 


 


 6.1 g/dL


(6.4-8.2)


 


Albumin


 3.6 g/dL


(3.4-5.0) 


 


 3.0 g/dL


(3.4-5.0)


 


Albumin/Globulin Ratio 1.0 (1.0-1.7)    1.0 (1.0-1.7) 


 


Glucose (Fingerstick)


 


 89 mg/dL


(70-99) 


 





 


Urine Collection Type   Void  


 


Urine Color   Yellow  


 


Urine Clarity   Turbid  


 


Urine pH   7.5  


 


Urine Specific Gravity   1.020  


 


Urine Protein


 


 


 Negative mg/dL


(NEG-TRACE) 





 


Urine Glucose (UA)


 


 


 Negative mg/dL


(NEG) 





 


Urine Ketones (Stick)


 


 


 Negative mg/dL


(NEG) 





 


Urine Blood   Negative (NEG)  


 


Urine Nitrite   Negative (NEG)  


 


Urine Bilirubin   Negative (NEG)  


 


Urine Urobilinogen Dipstick


 


 


 1.0 mg/dL (0.2


mg/dL) 





 


Urine Leukocyte Esterase   Negative (NEG)  


 


Urine RBC   0 /HPF (0-2)  


 


Urine WBC   0 /HPF (0-4)  


 


Urine Amorphous Sediment   Present /HPF  


 


Urine Bacteria   0 /HPF (0-FEW)  


 


Thyroid Stimulating Hormone


(TSH) 


 


 


 1.067 uIU/mL


(0.358-3.74)


 


Free Thyroxine


 


 


 


 1.07 ng/dL


(0.76-1.46)


 


Free Triiodothyronine (T3)


pg/mL 


 


 


 1.68 pg/mL


(2.18-3.98)








Laboratory Tests








Test


 11/3/18


12:33 11/4/18


04:45


 


Urine Collection Type Void  


 


Urine Color Yellow  


 


Urine Clarity Turbid  


 


Urine pH 7.5  


 


Urine Specific Gravity 1.020  


 


Urine Protein


 Negative mg/dL


(NEG-TRACE) 





 


Urine Glucose (UA)


 Negative mg/dL


(NEG) 





 


Urine Ketones (Stick)


 Negative mg/dL


(NEG) 





 


Urine Blood Negative (NEG)  


 


Urine Nitrite Negative (NEG)  


 


Urine Bilirubin Negative (NEG)  


 


Urine Urobilinogen Dipstick


 1.0 mg/dL (0.2


mg/dL) 





 


Urine Leukocyte Esterase Negative (NEG)  


 


Urine RBC 0 /HPF (0-2)  


 


Urine WBC 0 /HPF (0-4)  


 


Urine Amorphous Sediment Present /HPF  


 


Urine Bacteria 0 /HPF (0-FEW)  


 


White Blood Count


 


 6.8 x10^3/uL


(4.0-11.0)


 


Red Blood Count


 


 3.80 x10^6/uL


(4.30-5.70)


 


Hemoglobin


 


 10.9 g/dL


(13.0-17.5)


 


Hematocrit


 


 32.7 %


(39.0-53.0)


 


Mean Corpuscular Volume  86 fL () 


 


Mean Corpuscular Hemoglobin  29 pg (25-35) 


 


Mean Corpuscular Hemoglobin


Concent 


 34 g/dL


(31-37)


 


Red Cell Distribution Width


 


 14.2 %


(11.5-14.5)


 


Platelet Count


 


 219 x10^3/uL


(140-400)


 


Neutrophils (%) (Auto)  70 % (31-73) 


 


Lymphocytes (%) (Auto)  16 % (24-48) 


 


Monocytes (%) (Auto)  7 % (0-9) 


 


Eosinophils (%) (Auto)  6 % (0-3) 


 


Basophils (%) (Auto)  1 % (0-3) 


 


Neutrophils # (Auto)


 


 4.8 x10^3uL


(1.8-7.7)


 


Lymphocytes # (Auto)


 


 1.1 x10^3/uL


(1.0-4.8)


 


Monocytes # (Auto)


 


 0.5 x10^3/uL


(0.0-1.1)


 


Eosinophils # (Auto)


 


 0.4 x10^3/uL


(0.0-0.7)


 


Basophils # (Auto)


 


 0.1 x10^3/uL


(0.0-0.2)


 


Sodium Level


 


 144 mmol/L


(136-145)


 


Potassium Level


 


 3.9 mmol/L


(3.5-5.1)


 


Chloride Level


 


 108 mmol/L


()


 


Carbon Dioxide Level


 


 28 mmol/L


(21-32)


 


Anion Gap  8 (6-14) 


 


Blood Urea Nitrogen


 


 21 mg/dL


(8-26)


 


Creatinine


 


 1.2 mg/dL


(0.7-1.3)


 


Estimated GFR


(Cockcroft-Gault) 


 58.1 





 


BUN/Creatinine Ratio  18 (6-20) 


 


Glucose Level


 


 85 mg/dL


(70-99)


 


Calcium Level


 


 9.2 mg/dL


(8.5-10.1)


 


Magnesium Level


 


 1.9 mg/dL


(1.8-2.4)


 


Total Bilirubin


 


 0.3 mg/dL


(0.2-1.0)


 


Aspartate Amino Transf


(AST/SGOT) 


 12 U/L (15-37) 





 


Alanine Aminotransferase


(ALT/SGPT) 


 12 U/L (16-63) 





 


Alkaline Phosphatase


 


 27 U/L


()


 


Total Protein


 


 6.1 g/dL


(6.4-8.2)


 


Albumin


 


 3.0 g/dL


(3.4-5.0)


 


Albumin/Globulin Ratio  1.0 (1.0-1.7) 


 


Thyroid Stimulating Hormone


(TSH) 


 1.067 uIU/mL


(0.358-3.74)


 


Free Thyroxine


 


 1.07 ng/dL


(0.76-1.46)


 


Free Triiodothyronine (T3)


pg/mL 


 1.68 pg/mL


(2.18-3.98)











Assessment/Plan


Assessment/Plan


IMP





CHRONIC HYPOMAGNESEMIA WITHOUT ANY OTHER ELECTROLYTE ABNORMALITIES


NOT ON ANY MEDS THAT SHOULD CAUSE THIS


SUSPECT A VARIANT OF GITELMANS


MILD MET ALKALOSIS


VTACH


CM





PLAN





REPLACE MAG AS NEEDED


AVOID THIAZIDE DIURETICS 


CARDIOLOGY EVAL AND TX


CHECK URINE LYTES


WILL FOLLOW


UPDATED FAMILY











KIRSTEN WEINSTEIN MD Nov 4, 2018 10:48

## 2018-11-04 NOTE — RAD
CT chest without contrast, CT abdomen pelvis without contrast.

 

HISTORY: Weight loss

 

CT CHEST:

CT scan the chest was done without contrast. The thyroid is homogeneous. 

There is no mediastinal adenopathy or pleural effusion. There is a 

pacemaker on the left. There is hypertrophic and degenerative change in 

the thoracic spine without a fracture. A bony destructive process is not 

identified. Patient's had previous bypass. There is mild infiltrate or 

atelectasis along the left diaphragm. There is a focal infiltrate in the 

medial right lower lobe, a mass is possible follow-up study following 

therapy would be of benefit to differentiate at irregular mass versus a 

focal infiltrate. There was no density in this location on the CT from 

November 2017.

 

IMPRESSION:

1. Focal infiltrate in the medial right lower lobe, a mass would be 

possible follow-up study would be recommended.

2. Mild infiltrate or atelectasis along the left diaphragm.

3. No adenopathy.

4. No pleural effusion.

 

End impression

 

CT abdomen and pelvis

 

CT scan of the abdomen and pelvis was done following the CT chest without 

contrast. A liver lesion is not identified. There is a gallstone in the 

gallbladder. Spleen and adrenal glands are normal. Pancreas appears 

normal. There is a cyst in the medial right kidney. There is no renal mass

or hydronephrosis. There is no adenopathy or ascites. There is a 3 cm 

abdominal aortic aneurysm. There is moderate stool at the rectum. There is

diverticulosis of the colon

 

IMPRESSION:

1. Diverticulosis the colon without diverticulitis. There is some mild 

wall thickening of the sigmoid colon which could be diverticulitis 

although colonoscopy could be of benefit.

2. Moderate stool in the colon.

3. No bowel obstruction.

4. Small gallstone 

5. Small abdominal aortic aneurysm.

6. No other abdominal or pelvic mass noted

 

Electronically signed by: Navarro Pina MD (11/4/2018 2:12 PM) [facility]

## 2018-11-04 NOTE — PDOC
PROGRESS NOTES


Subjective


Subjective


Patient is in sinus rhythm now. Occasional PVC, no V. tach.





The CT of chest abdomen and pelvis was done and it shows no apparent masses or 

tumor. Patient has diverticulosis without diverticulitis and a gallstone.





The bone scan shows:


1. Small focus of activity posterior left seventh rib corresponding to a 


blastic or sclerotic lesion on the CT, possible bony metastatic disease, 


an old injury would be possible.


2. Slight activity at the costovertebral junctions on the left metastatic 


disease would be unusual in this pattern or old trauma or arthritis can 


have this pattern.





Objective


Objective





Vital Signs








  Date Time  Temp Pulse Resp B/P (MAP) Pulse Ox O2 Delivery O2 Flow Rate FiO2


 


11/4/18 11:27 97.4 69 18 115/64 (81) 98 Room Air  





 97.4       














Intake and Output 


 


 11/4/18





 07:00


 


Intake Total 1100 ml


 


Output Total 925 ml


 


Balance 175 ml


 


 


 


Intake Oral 650 ml


 


IV Total 450 ml


 


Output Urine Total 925 ml











Physical Exam


Physical Exam


No significant changes in cardiac exam





Assessment


Assessment


Patient's rhythm appears to be doing better. He is off the IV amiodarone now 

and on the by mouth amiodarone will give him 400 mg by mouth twice a day for 7 

days and then change him to a maintenance dose of 200 mg a day.





In view of the findings of the bone scan I would suggest to consult an 

oncologist who tried to do fine if he has metastatic bony disease or not.





Comment


Review of Relevant


I have reviewed the following items falguni (where applicable) has been applied.


Labs





Laboratory Tests








Test


 11/3/18


09:50 11/3/18


09:59 11/3/18


12:33 11/4/18


04:45


 


White Blood Count


 6.9 x10^3/uL


(4.0-11.0) 


 


 6.8 x10^3/uL


(4.0-11.0)


 


Red Blood Count


 4.09 x10^6/uL


(4.30-5.70) 


 


 3.80 x10^6/uL


(4.30-5.70)


 


Hemoglobin


 11.7 g/dL


(13.0-17.5) 


 


 10.9 g/dL


(13.0-17.5)


 


Hematocrit


 35.5 %


(39.0-53.0) 


 


 32.7 %


(39.0-53.0)


 


Mean Corpuscular Volume 87 fL ()    86 fL () 


 


Mean Corpuscular Hemoglobin 29 pg (25-35)    29 pg (25-35) 


 


Mean Corpuscular Hemoglobin


Concent 33 g/dL


(31-37) 


 


 34 g/dL


(31-37)


 


Red Cell Distribution Width


 13.9 %


(11.5-14.5) 


 


 14.2 %


(11.5-14.5)


 


Platelet Count


 255 x10^3/uL


(140-400) 


 


 219 x10^3/uL


(140-400)


 


Neutrophils (%) (Auto) 77 % (31-73)    70 % (31-73) 


 


Lymphocytes (%) (Auto) 12 % (24-48)    16 % (24-48) 


 


Monocytes (%) (Auto) 6 % (0-9)    7 % (0-9) 


 


Eosinophils (%) (Auto) 4 % (0-3)    6 % (0-3) 


 


Basophils (%) (Auto) 1 % (0-3)    1 % (0-3) 


 


Neutrophils # (Auto)


 5.3 x10^3uL


(1.8-7.7) 


 


 4.8 x10^3uL


(1.8-7.7)


 


Lymphocytes # (Auto)


 0.8 x10^3/uL


(1.0-4.8) 


 


 1.1 x10^3/uL


(1.0-4.8)


 


Monocytes # (Auto)


 0.4 x10^3/uL


(0.0-1.1) 


 


 0.5 x10^3/uL


(0.0-1.1)


 


Eosinophils # (Auto)


 0.3 x10^3/uL


(0.0-0.7) 


 


 0.4 x10^3/uL


(0.0-0.7)


 


Basophils # (Auto)


 0.0 x10^3/uL


(0.0-0.2) 


 


 0.1 x10^3/uL


(0.0-0.2)


 


Prothrombin Time


 14.0 SEC


(11.7-14.0) 


 


 





 


Prothromb Time International


Ratio 1.1 (0.8-1.1) 


 


 


 





 


Sodium Level


 146 mmol/L


(136-145) 


 


 144 mmol/L


(136-145)


 


Potassium Level


 4.0 mmol/L


(3.5-5.1) 


 


 3.9 mmol/L


(3.5-5.1)


 


Chloride Level


 108 mmol/L


() 


 


 108 mmol/L


()


 


Carbon Dioxide Level


 31 mmol/L


(21-32) 


 


 28 mmol/L


(21-32)


 


Anion Gap 7 (6-14)    8 (6-14) 


 


Blood Urea Nitrogen


 22 mg/dL


(8-26) 


 


 21 mg/dL


(8-26)


 


Creatinine


 1.3 mg/dL


(0.7-1.3) 


 


 1.2 mg/dL


(0.7-1.3)


 


Estimated GFR


(Cockcroft-Gault) 53.0 


 


 


 58.1 





 


BUN/Creatinine Ratio 17 (6-20)    18 (6-20) 


 


Glucose Level


 83 mg/dL


(70-99) 


 


 85 mg/dL


(70-99)


 


Calcium Level


 9.7 mg/dL


(8.5-10.1) 


 


 9.2 mg/dL


(8.5-10.1)


 


Magnesium Level


 1.7 mg/dL


(1.8-2.4) 


 


 1.9 mg/dL


(1.8-2.4)


 


Total Bilirubin


 0.3 mg/dL


(0.2-1.0) 


 


 0.3 mg/dL


(0.2-1.0)


 


Aspartate Amino Transf


(AST/SGOT) 15 U/L (15-37) 


 


 


 12 U/L (15-37) 





 


Alanine Aminotransferase


(ALT/SGPT) 18 U/L (16-63) 


 


 


 12 U/L (16-63) 





 


Alkaline Phosphatase


 38 U/L


() 


 


 27 U/L


()


 


Creatine Kinase


 24 U/L


() 


 


 





 


Creatine Kinase MB (Mass)


 < 0.5 ng/mL


(0.0-3.6) 


 


 





 


Creatine Kinase MB Relative


Index  % (0-4) 


 


 


 





 


Troponin I Quantitative


 < 0.017 ng/mL


(0.000-0.055) 


 


 





 


NT-Pro-B-Type Natriuretic


Peptide 842 pg/mL


(0-449) 


 


 





 


Total Protein


 7.2 g/dL


(6.4-8.2) 


 


 6.1 g/dL


(6.4-8.2)


 


Albumin


 3.6 g/dL


(3.4-5.0) 


 


 3.0 g/dL


(3.4-5.0)


 


Albumin/Globulin Ratio 1.0 (1.0-1.7)    1.0 (1.0-1.7) 


 


Glucose (Fingerstick)


 


 89 mg/dL


(70-99) 


 





 


Urine Collection Type   Void  


 


Urine Color   Yellow  


 


Urine Clarity   Turbid  


 


Urine pH   7.5  


 


Urine Specific Gravity   1.020  


 


Urine Protein


 


 


 Negative mg/dL


(NEG-TRACE) 





 


Urine Glucose (UA)


 


 


 Negative mg/dL


(NEG) 





 


Urine Ketones (Stick)


 


 


 Negative mg/dL


(NEG) 





 


Urine Blood   Negative (NEG)  


 


Urine Nitrite   Negative (NEG)  


 


Urine Bilirubin   Negative (NEG)  


 


Urine Urobilinogen Dipstick


 


 


 1.0 mg/dL (0.2


mg/dL) 





 


Urine Leukocyte Esterase   Negative (NEG)  


 


Urine RBC   0 /HPF (0-2)  


 


Urine WBC   0 /HPF (0-4)  


 


Urine Amorphous Sediment   Present /HPF  


 


Urine Bacteria   0 /HPF (0-FEW)  


 


Thyroid Stimulating Hormone


(TSH) 


 


 


 1.067 uIU/mL


(0.358-3.74)


 


Free Thyroxine


 


 


 


 1.07 ng/dL


(0.76-1.46)


 


Free Triiodothyronine (T3)


pg/mL 


 


 


 1.68 pg/mL


(2.18-3.98)








Laboratory Tests








Test


 11/4/18


04:45


 


White Blood Count


 6.8 x10^3/uL


(4.0-11.0)


 


Red Blood Count


 3.80 x10^6/uL


(4.30-5.70)


 


Hemoglobin


 10.9 g/dL


(13.0-17.5)


 


Hematocrit


 32.7 %


(39.0-53.0)


 


Mean Corpuscular Volume 86 fL () 


 


Mean Corpuscular Hemoglobin 29 pg (25-35) 


 


Mean Corpuscular Hemoglobin


Concent 34 g/dL


(31-37)


 


Red Cell Distribution Width


 14.2 %


(11.5-14.5)


 


Platelet Count


 219 x10^3/uL


(140-400)


 


Neutrophils (%) (Auto) 70 % (31-73) 


 


Lymphocytes (%) (Auto) 16 % (24-48) 


 


Monocytes (%) (Auto) 7 % (0-9) 


 


Eosinophils (%) (Auto) 6 % (0-3) 


 


Basophils (%) (Auto) 1 % (0-3) 


 


Neutrophils # (Auto)


 4.8 x10^3uL


(1.8-7.7)


 


Lymphocytes # (Auto)


 1.1 x10^3/uL


(1.0-4.8)


 


Monocytes # (Auto)


 0.5 x10^3/uL


(0.0-1.1)


 


Eosinophils # (Auto)


 0.4 x10^3/uL


(0.0-0.7)


 


Basophils # (Auto)


 0.1 x10^3/uL


(0.0-0.2)


 


Sodium Level


 144 mmol/L


(136-145)


 


Potassium Level


 3.9 mmol/L


(3.5-5.1)


 


Chloride Level


 108 mmol/L


()


 


Carbon Dioxide Level


 28 mmol/L


(21-32)


 


Anion Gap 8 (6-14) 


 


Blood Urea Nitrogen


 21 mg/dL


(8-26)


 


Creatinine


 1.2 mg/dL


(0.7-1.3)


 


Estimated GFR


(Cockcroft-Gault) 58.1 





 


BUN/Creatinine Ratio 18 (6-20) 


 


Glucose Level


 85 mg/dL


(70-99)


 


Calcium Level


 9.2 mg/dL


(8.5-10.1)


 


Magnesium Level


 1.9 mg/dL


(1.8-2.4)


 


Total Bilirubin


 0.3 mg/dL


(0.2-1.0)


 


Aspartate Amino Transf


(AST/SGOT) 12 U/L (15-37) 





 


Alanine Aminotransferase


(ALT/SGPT) 12 U/L (16-63) 





 


Alkaline Phosphatase


 27 U/L


()


 


Total Protein


 6.1 g/dL


(6.4-8.2)


 


Albumin


 3.0 g/dL


(3.4-5.0)


 


Albumin/Globulin Ratio 1.0 (1.0-1.7) 


 


Thyroid Stimulating Hormone


(TSH) 1.067 uIU/mL


(0.358-3.74)


 


Free Thyroxine


 1.07 ng/dL


(0.76-1.46)


 


Free Triiodothyronine (T3)


pg/mL 1.68 pg/mL


(2.18-3.98)








Medications





Current Medications


Magnesium Sulfate 50 ml @ 25 mls/hr 1X  ONCE IV  Last administered on 11/3/18at 

11:00;  Start 11/3/18 at 11:00;  Stop 11/3/18 at 12:59;  Status DC


Amiodarone HCl 150 mg/Dextrose 103 ml @  600 mls/hr 1X  ONCE IV  Last 

administered on 11/3/18at 13:49;  Start 11/3/18 at 14:00;  Stop 11/3/18 at 14:10

;  Status DC


Amiodarone HCl 900 mg/Dextrose 518 ml @  33.33 mls/ hr CONT  PRN IV SEE I/O 

RECORD Last administered on 11/3/18at 13:49;  Start 11/3/18 at 14:30;  Stop 11/3

/18 at 14:30;  Status DC


Amiodarone HCl 900 mg/Dextrose 518 ml @  16.67 mls/ hr CONT  PRN IV SEE I/O 

RECORD;  Start 11/3/18 at 13:15;  Stop 11/4/18 at 07:14;  Status DC


Aspirin (Ecotrin) 325 mg DAILY16 PO  Last administered on 11/3/18at 17:33;  

Start 11/3/18 at 16:00


Atorvastatin Calcium (Lipitor) 20 mg DAILY16 PO ;  Start 11/3/18 at 16:00;  

Stop 11/3/18 at 16:12;  Status DC


Acetaminophen (Tylenol) 500 mg QHS PO  Last administered on 11/3/18at 20:39;  

Start 11/3/18 at 21:00


Magnesium Oxide (Magnesium Oxide) 400 mg BID PO  Last administered on 11/4/18at 

09:40;  Start 11/3/18 at 21:00


Metformin HCl (Glucophage) 500 mg BIDWMEALS PO  Last administered on 11/4/18at 

09:39;  Start 11/3/18 at 17:00;  Stop 11/4/18 at 10:24;  Status DC


Multivitamins (Thera M Plus) 1 tab DAILY PO  Last administered on 11/4/18at 09:

39;  Start 11/3/18 at 17:00


Non-Formulary Medication ([melatonin] ) 10 mg QHS PO ;  Start 11/3/18 at 21:00;

  Status UNV


Magnesium Sulfate/ Dextrose 100 ml @  100 mls/hr 1X  ONCE IV ;  Start 11/3/18 

at 15:00;  Stop 11/3/18 at 15:43;  Status DC


Diphenhydramine HCl (Benadryl) 25 mg QHS PO  Last administered on 11/3/18at 20:

39;  Start 11/3/18 at 21:00


Atorvastatin Calcium (Lipitor) 20 mg QHS PO  Last administered on 11/3/18at 20:

39;  Start 11/3/18 at 21:00


Amiodarone HCl (Cordarone) 400 mg BID PO  Last administered on 11/4/18at 09:39;

  Start 11/4/18 at 09:00


Iohexol (Omnipaque 240 Mg/ml) 50 ml 1X  ONCE PO  Last administered on 11/4/18at 

10:30;  Start 11/4/18 at 10:30;  Stop 11/4/18 at 10:31;  Status DC


Info (CONTRAST GIVEN -- Rx MONITORING) 1 each PRN DAILY  PRN MC SEE COMMENTS;  

Start 11/4/18 at 10:30;  Stop 11/6/18 at 10:29


Metformin HCl (Glucophage) 500 mg BIDWMEALS PO ;  Start 11/6/18 at 08:00





Active Scripts


Active


Mag-Oxide (Magnesium Oxide) 400 Mg Tablet 400 Mg PO BID 30 Days


Propafenone Hcl 150 Mg Tablet 150 Mg PO BID 30 Days


Reported


[melatonin]   10 Mg PO QHS


Centrum Silver Tablet (Multivits-Min/Fa/Lycopene/Lut) 1 Each Tablet 1 Tab  DAILY


Acetadryl 500-25 Mg Caplet (Acetaminophen/Diphenhydramine) 1 Each Tablet 1 Tab  

HS


Aspir-Vilma (Aspirin) 325 Mg Tablet.dr 1 Tab PO DAILY16


Metformin Hcl 500 Mg Tablet 1 Tab  BIDBFRMEAL


Atorvastatin Calcium 20 Mg Tablet 1 Tab  DAILY16


Vitals/I & O





Vital Sign - Last 24 Hours








 11/3/18 11/3/18 11/3/18 11/3/18





 16:00 17:00 18:00 19:00


 


Pulse 80 84 80 78


 


B/P (MAP) 129/56 (80) 124/63 (83) 134/68 (90) 138/72 (94)





 11/3/18 11/3/18 11/3/18 11/4/18





 19:55 20:00 23:50 03:30


 


Temp 98.0  97.6 98.4





 98.0  97.6 98.4


 


Pulse 80  72 66


 


Resp 16  14 16


 


B/P (MAP) 125/67 (86)  129/60 (83) 105/52 (69)


 


Pulse Ox 97  99 99


 


O2 Delivery Room Air Room Air Room Air Room Air


 


    





    





 11/4/18 11/4/18 11/4/18 11/4/18





 07:35 08:00 08:33 09:39


 


Temp 97.5   





 97.5   


 


Pulse 72 66  66


 


Resp 14   


 


B/P (MAP) 105/50 (68) 133/59 (83)  133/59


 


Pulse Ox 97   


 


O2 Delivery Room Air  Room Air 


 


    





    





 11/4/18   





 11:27   


 


Temp 97.4   





 97.4   


 


Pulse 69   


 


Resp 18   


 


B/P (MAP) 115/64 (81)   


 


Pulse Ox 98   


 


O2 Delivery Room Air   














Intake and Output   


 


 11/3/18 11/3/18 11/4/18





 15:00 23:00 07:00


 


Intake Total  400 ml 700 ml


 


Output Total  575 ml 350 ml


 


Balance  -175 ml 350 ml

















OSWALD COE MD Nov 4, 2018 15:45

## 2018-11-05 VITALS — DIASTOLIC BLOOD PRESSURE: 66 MMHG | SYSTOLIC BLOOD PRESSURE: 127 MMHG

## 2018-11-05 VITALS — SYSTOLIC BLOOD PRESSURE: 119 MMHG | DIASTOLIC BLOOD PRESSURE: 73 MMHG

## 2018-11-05 VITALS — DIASTOLIC BLOOD PRESSURE: 61 MMHG | SYSTOLIC BLOOD PRESSURE: 112 MMHG

## 2018-11-05 VITALS — DIASTOLIC BLOOD PRESSURE: 56 MMHG | SYSTOLIC BLOOD PRESSURE: 101 MMHG

## 2018-11-05 VITALS — SYSTOLIC BLOOD PRESSURE: 147 MMHG | DIASTOLIC BLOOD PRESSURE: 68 MMHG

## 2018-11-05 VITALS — SYSTOLIC BLOOD PRESSURE: 136 MMHG | DIASTOLIC BLOOD PRESSURE: 66 MMHG

## 2018-11-05 LAB
ANION GAP SERPL CALC-SCNC: 8 MMOL/L (ref 6–14)
BUN SERPL-MCNC: 16 MG/DL (ref 8–26)
CALCIUM SERPL-MCNC: 9.5 MG/DL (ref 8.5–10.1)
CHLORIDE SERPL-SCNC: 107 MMOL/L (ref 98–107)
CO2 SERPL-SCNC: 29 MMOL/L (ref 21–32)
CREAT SERPL-MCNC: 1.1 MG/DL (ref 0.7–1.3)
GFR SERPLBLD BASED ON 1.73 SQ M-ARVRAT: 64.2 ML/MIN
GLUCOSE SERPL-MCNC: 89 MG/DL (ref 70–99)
MAGNESIUM SERPL-MCNC: 1.8 MG/DL (ref 1.8–2.4)
PHOSPHATE SERPL-MCNC: 3.5 MG/DL (ref 2.6–4.7)
POTASSIUM SERPL-SCNC: 4.3 MMOL/L (ref 3.5–5.1)
SODIUM SERPL-SCNC: 144 MMOL/L (ref 136–145)

## 2018-11-05 RX ADMIN — AMIODARONE HYDROCHLORIDE SCH MG: 200 TABLET ORAL at 20:02

## 2018-11-05 RX ADMIN — AMIODARONE HYDROCHLORIDE SCH MG: 200 TABLET ORAL at 09:20

## 2018-11-05 RX ADMIN — MAGNESIUM OXIDE TAB 400 MG (241.3 MG ELEMENTAL MG) SCH MG: 400 (241.3 MG) TAB at 20:02

## 2018-11-05 RX ADMIN — MULTIPLE VITAMINS W/ MINERALS TAB SCH TAB: TAB at 09:20

## 2018-11-05 RX ADMIN — ATORVASTATIN CALCIUM SCH MG: 20 TABLET, FILM COATED ORAL at 20:03

## 2018-11-05 RX ADMIN — ACETAMINOPHEN SCH MG: 500 TABLET ORAL at 20:03

## 2018-11-05 RX ADMIN — ASPIRIN SCH MG: 325 TABLET, DELAYED RELEASE ORAL at 16:59

## 2018-11-05 RX ADMIN — MAGNESIUM OXIDE TAB 400 MG (241.3 MG ELEMENTAL MG) SCH MG: 400 (241.3 MG) TAB at 09:20

## 2018-11-05 NOTE — PDOC
SUBJECTIVE


ROS


Asked to see for hypomagnesemia





Patient denies current diarrhea nausea vomiting





OBJECTIVE


Vital Signs





Vital Signs








  Date Time  Temp Pulse Resp B/P (MAP) Pulse Ox O2 Delivery O2 Flow Rate FiO2


 


11/5/18 10:14 97.4 82 20 136/66 (89) 99 Room Air  





 97.4       








I & 0











Intake and Output 


 


 11/5/18





 07:00


 


Intake Total 850 ml


 


Output Total 2150 ml


 


Balance -1300 ml


 


 


 


Intake Oral 850 ml


 


Output Urine Total 2150 ml


 


# Voids 1











PHYSICAL EXAM


Physical Exam





General Appearance:          


Awake:      Alert Oriented  x 3 


Neck:    No JVD or JVP


Chest:    CTA Lester


Heart:    S1 S2


Abdomen -    Soft NTND


Extremities -    No Edema





DIAGNOSIS/ASSESSMENT


Assessment & Plan


Hypomagnesemia: Without any obvious etiology. Possible contribution from 

metformin, diabetes cannot be ruled out. Last magnesium supplementation was 

about 2 days ago. This was verified with the nurse. At this point of time 24-

hour urine for magnesium may not be very accurate to estimate urinary losses. 

However we will proceed with same for completion. This was not orderable in our 

EMR and hence a miscellaneous ordered for the same has been placed





If magnesium excretion is noted to be elevated he will need more magnesium 

supplements orally





COMMENT/RELEVANT DATA


Meds





Current Medications








 Medications


  (Trade)  Dose


 Ordered  Sig/Pedro Pablo  Start Time


 Stop Time Status Last Admin


Dose Admin


 


 Acetaminophen


  (Tylenol)  500 mg  QHS  11/3/18 21:00


    11/4/18 21:16


500 MG


 


 Amiodarone HCl


  (Cordarone)  400 mg  BID  11/4/18 09:00


    11/5/18 09:20


400 MG


 


 Amiodarone HCl


 150 mg/Dextrose  103 ml @ 


 600 mls/hr  1X  ONCE  11/3/18 14:00


 11/3/18 14:10 DC 11/3/18 13:49


600 MLS/HR


 


 Amiodarone HCl


 900 mg/Dextrose  518 ml @ 


 16.67 mls/


 hr  CONT  PRN  11/3/18 13:15


 11/4/18 07:14 DC  





 


 Aspirin


  (Ecotrin)  325 mg  DAILY16  11/3/18 16:00


    11/4/18 16:35


325 MG


 


 Atorvastatin


 Calcium


  (Lipitor)  20 mg  QHS  11/3/18 21:00


    11/4/18 21:16


20 MG


 


 Diphenhydramine


 HCl


  (Benadryl)  25 mg  QHS  11/3/18 21:00


    11/4/18 21:16


25 MG


 


 Info


  (CONTRAST GIVEN


 -- Rx MONITORING)  1 each  PRN DAILY  PRN  11/4/18 10:30


 11/6/18 10:29   





 


 Iohexol


  (Omnipaque 240


 Mg/ml)  50 ml  1X  ONCE  11/4/18 10:30


 11/4/18 10:31 DC 11/4/18 10:30


50 ML


 


 Magnesium Oxide


  (Magnesium Oxide)  400 mg  BID  11/3/18 21:00


    11/5/18 09:20


400 MG


 


 Magnesium Sulfate  50 ml @ 25


 mls/hr  1X  ONCE  11/3/18 11:00


 11/3/18 12:59 DC 11/3/18 11:00


25 MLS/HR


 


 Magnesium Sulfate/


 Dextrose  100 ml @ 


 100 mls/hr  1X  ONCE  11/3/18 15:00


 11/3/18 15:43 DC  





 


 Metformin HCl


  (Glucophage)  500 mg  BIDWMEALS  11/6/18 08:00


     





 


 Multivitamins


  (Thera M Plus)  1 tab  DAILY  11/3/18 17:00


    11/5/18 09:20


1 TAB


 


 Non-Formulary


 Medication


  ([melatonin] )  10 mg  QHS  11/3/18 21:00


   UNV  





 


 Tamsulosin HCl


  (Flomax)  0.4 mg  QHS  11/5/18 21:00


     











Lab





Laboratory Tests








Test


 11/4/18


11:35 11/5/18


05:30


 


Urine Sodium


 122 mmol/L


(Not Estab.) 





 


Urine Potassium


 35.4 mmol/L


(Not Estab.) 





 


Urine Chloride


 96 mmol/L (Not


Estab.) 





 


Sodium Level


 


 144 mmol/L


(136-145)


 


Potassium Level


 


 4.3 mmol/L


(3.5-5.1)


 


Chloride Level


 


 107 mmol/L


()


 


Carbon Dioxide Level


 


 29 mmol/L


(21-32)


 


Anion Gap  8 (6-14) 


 


Blood Urea Nitrogen


 


 16 mg/dL


(8-26)


 


Creatinine


 


 1.1 mg/dL


(0.7-1.3)


 


Estimated GFR


(Cockcroft-Gault) 


 64.2 





 


Glucose Level


 


 89 mg/dL


(70-99)


 


Calcium Level


 


 9.5 mg/dL


(8.5-10.1)


 


Phosphorus Level


 


 3.5 mg/dL


(2.6-4.7)


 


Magnesium Level


 


 1.8 mg/dL


(1.8-2.4)








Results


All relevant outside records, renal labs, imaging studies, telemetry/EKG's were 

reviewed.











ALICIA NUNN MD Nov 5, 2018 11:12

## 2018-11-05 NOTE — PDOC
PROGRESS NOTES


Subjective


Subjective


Patient feeling better. Patient's main complaint is frequent urination at 

night. CT without evidence of primary cancer. Bone scan with possible bone 

lesion vs Old injury. Patient rhythm much better on amiodarone.





Objective


Objective





Vital Signs








  Date Time  Temp Pulse Resp B/P (MAP) Pulse Ox O2 Delivery O2 Flow Rate FiO2


 


11/5/18 08:00      Room Air  


 


11/5/18 06:57 98.4 83 18 112/61 (78) 98   





 98.4       














Intake and Output 


 


 11/5/18





 07:00


 


Intake Total 850 ml


 


Output Total 2150 ml


 


Balance -1300 ml


 


 


 


Intake Oral 850 ml


 


Output Urine Total 2150 ml


 


# Voids 1











Physical Exam


Abdomen:  Normal bowel sounds


Heart:  Regular rate


Extremities:  No edema


General:  Alert


Lungs:  Clear to auscultation





Assessment


Assessment


1.  Ventricular tachycardia, cardiac arrhythmia.


2.  Orthostatic hypotension.


3.  Coronary artery disease.


4.  Hypomagnesemia.


5.  Hx of weight loss but stable weight last 6 months


6.  Questionable bone lesion left 7th rib


7.  Normal PSA 9/2018

















PAST MEDICAL HISTORY:  Significant for:


1.  Coronary artery disease with history of coronary artery bypass graft and MI.


 The patient has also had multiple stents placed.


2.  Type 2 diabetes.


3.  Hypertension.


4.  High cholesterol.


5.  Spinal stenosis of L-spine.


6.  Sick sinus syndrome with pacer and ICD.


7.  History of SVT.


8.  BPH.


9.  History of prostate cancer.


10.  History of left eye melanoma.


11.  Chronic kidney disease stage 3.





Plan


Plan of Care


Add Flomax


Get ONC opinion 


Increase activity


Continue PO amiodarone


Home in AM if stable





Comment


Review of Relevant


I have reviewed the following items falguni (where applicable) has been applied.


Labs





Laboratory Tests








Test


 11/3/18


09:50 11/3/18


09:59 11/3/18


12:33 11/4/18


04:45


 


White Blood Count


 6.9 x10^3/uL


(4.0-11.0) 


 


 6.8 x10^3/uL


(4.0-11.0)


 


Red Blood Count


 4.09 x10^6/uL


(4.30-5.70) 


 


 3.80 x10^6/uL


(4.30-5.70)


 


Hemoglobin


 11.7 g/dL


(13.0-17.5) 


 


 10.9 g/dL


(13.0-17.5)


 


Hematocrit


 35.5 %


(39.0-53.0) 


 


 32.7 %


(39.0-53.0)


 


Mean Corpuscular Volume 87 fL ()    86 fL () 


 


Mean Corpuscular Hemoglobin 29 pg (25-35)    29 pg (25-35) 


 


Mean Corpuscular Hemoglobin


Concent 33 g/dL


(31-37) 


 


 34 g/dL


(31-37)


 


Red Cell Distribution Width


 13.9 %


(11.5-14.5) 


 


 14.2 %


(11.5-14.5)


 


Platelet Count


 255 x10^3/uL


(140-400) 


 


 219 x10^3/uL


(140-400)


 


Neutrophils (%) (Auto) 77 % (31-73)    70 % (31-73) 


 


Lymphocytes (%) (Auto) 12 % (24-48)    16 % (24-48) 


 


Monocytes (%) (Auto) 6 % (0-9)    7 % (0-9) 


 


Eosinophils (%) (Auto) 4 % (0-3)    6 % (0-3) 


 


Basophils (%) (Auto) 1 % (0-3)    1 % (0-3) 


 


Neutrophils # (Auto)


 5.3 x10^3uL


(1.8-7.7) 


 


 4.8 x10^3uL


(1.8-7.7)


 


Lymphocytes # (Auto)


 0.8 x10^3/uL


(1.0-4.8) 


 


 1.1 x10^3/uL


(1.0-4.8)


 


Monocytes # (Auto)


 0.4 x10^3/uL


(0.0-1.1) 


 


 0.5 x10^3/uL


(0.0-1.1)


 


Eosinophils # (Auto)


 0.3 x10^3/uL


(0.0-0.7) 


 


 0.4 x10^3/uL


(0.0-0.7)


 


Basophils # (Auto)


 0.0 x10^3/uL


(0.0-0.2) 


 


 0.1 x10^3/uL


(0.0-0.2)


 


Prothrombin Time


 14.0 SEC


(11.7-14.0) 


 


 





 


Prothromb Time International


Ratio 1.1 (0.8-1.1) 


 


 


 





 


Sodium Level


 146 mmol/L


(136-145) 


 


 144 mmol/L


(136-145)


 


Potassium Level


 4.0 mmol/L


(3.5-5.1) 


 


 3.9 mmol/L


(3.5-5.1)


 


Chloride Level


 108 mmol/L


() 


 


 108 mmol/L


()


 


Carbon Dioxide Level


 31 mmol/L


(21-32) 


 


 28 mmol/L


(21-32)


 


Anion Gap 7 (6-14)    8 (6-14) 


 


Blood Urea Nitrogen


 22 mg/dL


(8-26) 


 


 21 mg/dL


(8-26)


 


Creatinine


 1.3 mg/dL


(0.7-1.3) 


 


 1.2 mg/dL


(0.7-1.3)


 


Estimated GFR


(Cockcroft-Gault) 53.0 


 


 


 58.1 





 


BUN/Creatinine Ratio 17 (6-20)    18 (6-20) 


 


Glucose Level


 83 mg/dL


(70-99) 


 


 85 mg/dL


(70-99)


 


Calcium Level


 9.7 mg/dL


(8.5-10.1) 


 


 9.2 mg/dL


(8.5-10.1)


 


Magnesium Level


 1.7 mg/dL


(1.8-2.4) 


 


 1.9 mg/dL


(1.8-2.4)


 


Total Bilirubin


 0.3 mg/dL


(0.2-1.0) 


 


 0.3 mg/dL


(0.2-1.0)


 


Aspartate Amino Transf


(AST/SGOT) 15 U/L (15-37) 


 


 


 12 U/L (15-37) 





 


Alanine Aminotransferase


(ALT/SGPT) 18 U/L (16-63) 


 


 


 12 U/L (16-63) 





 


Alkaline Phosphatase


 38 U/L


() 


 


 27 U/L


()


 


Creatine Kinase


 24 U/L


() 


 


 





 


Creatine Kinase MB (Mass)


 < 0.5 ng/mL


(0.0-3.6) 


 


 





 


Creatine Kinase MB Relative


Index  % (0-4) 


 


 


 





 


Troponin I Quantitative


 < 0.017 ng/mL


(0.000-0.055) 


 


 





 


NT-Pro-B-Type Natriuretic


Peptide 842 pg/mL


(0-449) 


 


 





 


Total Protein


 7.2 g/dL


(6.4-8.2) 


 


 6.1 g/dL


(6.4-8.2)


 


Albumin


 3.6 g/dL


(3.4-5.0) 


 


 3.0 g/dL


(3.4-5.0)


 


Albumin/Globulin Ratio 1.0 (1.0-1.7)    1.0 (1.0-1.7) 


 


Glucose (Fingerstick)


 


 89 mg/dL


(70-99) 


 





 


Urine Collection Type   Void  


 


Urine Color   Yellow  


 


Urine Clarity   Turbid  


 


Urine pH   7.5  


 


Urine Specific Gravity   1.020  


 


Urine Protein


 


 


 Negative mg/dL


(NEG-TRACE) 





 


Urine Glucose (UA)


 


 


 Negative mg/dL


(NEG) 





 


Urine Ketones (Stick)


 


 


 Negative mg/dL


(NEG) 





 


Urine Blood   Negative (NEG)  


 


Urine Nitrite   Negative (NEG)  


 


Urine Bilirubin   Negative (NEG)  


 


Urine Urobilinogen Dipstick


 


 


 1.0 mg/dL (0.2


mg/dL) 





 


Urine Leukocyte Esterase   Negative (NEG)  


 


Urine RBC   0 /HPF (0-2)  


 


Urine WBC   0 /HPF (0-4)  


 


Urine Amorphous Sediment   Present /HPF  


 


Urine Bacteria   0 /HPF (0-FEW)  


 


Thyroid Stimulating Hormone


(TSH) 


 


 


 1.067 uIU/mL


(0.358-3.74)


 


Free Thyroxine


 


 


 


 1.07 ng/dL


(0.76-1.46)


 


Free Triiodothyronine (T3)


pg/mL 


 


 


 1.68 pg/mL


(2.18-3.98)


 


Test


 11/4/18


11:35 11/5/18


05:30 


 





 


Urine Sodium


 122 mmol/L


(Not Estab.) 


 


 





 


Urine Potassium


 35.4 mmol/L


(Not Estab.) 


 


 





 


Urine Chloride


 96 mmol/L (Not


Estab.) 


 


 





 


Sodium Level


 


 144 mmol/L


(136-145) 


 





 


Potassium Level


 


 4.3 mmol/L


(3.5-5.1) 


 





 


Chloride Level


 


 107 mmol/L


() 


 





 


Carbon Dioxide Level


 


 29 mmol/L


(21-32) 


 





 


Anion Gap  8 (6-14)   


 


Blood Urea Nitrogen


 


 16 mg/dL


(8-26) 


 





 


Creatinine


 


 1.1 mg/dL


(0.7-1.3) 


 





 


Estimated GFR


(Cockcroft-Gault) 


 64.2 


 


 





 


Glucose Level


 


 89 mg/dL


(70-99) 


 





 


Calcium Level


 


 9.5 mg/dL


(8.5-10.1) 


 





 


Phosphorus Level


 


 3.5 mg/dL


(2.6-4.7) 


 





 


Magnesium Level


 


 1.8 mg/dL


(1.8-2.4) 


 











Laboratory Tests








Test


 11/4/18


11:35 11/5/18


05:30


 


Urine Sodium


 122 mmol/L


(Not Estab.) 





 


Urine Potassium


 35.4 mmol/L


(Not Estab.) 





 


Urine Chloride


 96 mmol/L (Not


Estab.) 





 


Sodium Level


 


 144 mmol/L


(136-145)


 


Potassium Level


 


 4.3 mmol/L


(3.5-5.1)


 


Chloride Level


 


 107 mmol/L


()


 


Carbon Dioxide Level


 


 29 mmol/L


(21-32)


 


Anion Gap  8 (6-14) 


 


Blood Urea Nitrogen


 


 16 mg/dL


(8-26)


 


Creatinine


 


 1.1 mg/dL


(0.7-1.3)


 


Estimated GFR


(Cockcroft-Gault) 


 64.2 





 


Glucose Level


 


 89 mg/dL


(70-99)


 


Calcium Level


 


 9.5 mg/dL


(8.5-10.1)


 


Phosphorus Level


 


 3.5 mg/dL


(2.6-4.7)


 


Magnesium Level


 


 1.8 mg/dL


(1.8-2.4)








Medications





Current Medications


Magnesium Sulfate 50 ml @ 25 mls/hr 1X  ONCE IV  Last administered on 11/3/18at 

11:00;  Start 11/3/18 at 11:00;  Stop 11/3/18 at 12:59;  Status DC


Amiodarone HCl 150 mg/Dextrose 103 ml @  600 mls/hr 1X  ONCE IV  Last 

administered on 11/3/18at 13:49;  Start 11/3/18 at 14:00;  Stop 11/3/18 at 14:10

;  Status DC


Amiodarone HCl 900 mg/Dextrose 518 ml @  33.33 mls/ hr CONT  PRN IV SEE I/O 

RECORD Last administered on 11/3/18at 13:49;  Start 11/3/18 at 14:30;  Stop 11/3

/18 at 14:30;  Status DC


Amiodarone HCl 900 mg/Dextrose 518 ml @  16.67 mls/ hr CONT  PRN IV SEE I/O 

RECORD;  Start 11/3/18 at 13:15;  Stop 11/4/18 at 07:14;  Status DC


Aspirin (Ecotrin) 325 mg DAILY16 PO  Last administered on 11/4/18at 16:35;  

Start 11/3/18 at 16:00


Atorvastatin Calcium (Lipitor) 20 mg DAILY16 PO ;  Start 11/3/18 at 16:00;  

Stop 11/3/18 at 16:12;  Status DC


Acetaminophen (Tylenol) 500 mg QHS PO  Last administered on 11/4/18at 21:16;  

Start 11/3/18 at 21:00


Magnesium Oxide (Magnesium Oxide) 400 mg BID PO  Last administered on 11/4/18at 

21:17;  Start 11/3/18 at 21:00


Metformin HCl (Glucophage) 500 mg BIDWMEALS PO  Last administered on 11/4/18at 

09:39;  Start 11/3/18 at 17:00;  Stop 11/4/18 at 10:24;  Status DC


Multivitamins (Thera M Plus) 1 tab DAILY PO  Last administered on 11/4/18at 09:

39;  Start 11/3/18 at 17:00


Non-Formulary Medication ([melatonin] ) 10 mg QHS PO ;  Start 11/3/18 at 21:00;

  Status UNV


Magnesium Sulfate/ Dextrose 100 ml @  100 mls/hr 1X  ONCE IV ;  Start 11/3/18 

at 15:00;  Stop 11/3/18 at 15:43;  Status DC


Diphenhydramine HCl (Benadryl) 25 mg QHS PO  Last administered on 11/4/18at 21:

16;  Start 11/3/18 at 21:00


Atorvastatin Calcium (Lipitor) 20 mg QHS PO  Last administered on 11/4/18at 21:

16;  Start 11/3/18 at 21:00


Amiodarone HCl (Cordarone) 400 mg BID PO  Last administered on 11/4/18at 21:16;

  Start 11/4/18 at 09:00


Iohexol (Omnipaque 240 Mg/ml) 50 ml 1X  ONCE PO  Last administered on 11/4/18at 

10:30;  Start 11/4/18 at 10:30;  Stop 11/4/18 at 10:31;  Status DC


Info (CONTRAST GIVEN -- Rx MONITORING) 1 each PRN DAILY  PRN MC SEE COMMENTS;  

Start 11/4/18 at 10:30;  Stop 11/6/18 at 10:29


Metformin HCl (Glucophage) 500 mg BIDWMEALS PO ;  Start 11/6/18 at 08:00





Active Scripts


Active


Mag-Oxide (Magnesium Oxide) 400 Mg Tablet 400 Mg PO BID 30 Days


Propafenone Hcl 150 Mg Tablet 150 Mg PO BID 30 Days


Reported


[melatonin]   10 Mg PO QHS


Centrum Silver Tablet (Multivits-Min/Fa/Lycopene/Lut) 1 Each Tablet 1 Tab  DAILY


Acetadryl 500-25 Mg Caplet (Acetaminophen/Diphenhydramine) 1 Each Tablet 1 Tab  

HS


Aspir-Vilma (Aspirin) 325 Mg Tablet. 1 Tab PO DAILY16


Metformin Hcl 500 Mg Tablet 1 Tab  BIDBFRMEAL


Atorvastatin Calcium 20 Mg Tablet 1 Tab  DAILY16


Vitals/I & O





Vital Sign - Last 24 Hours








 11/4/18 11/4/18 11/4/18 11/4/18





 09:39 11:27 15:46 19:55


 


Temp  97.4 97.4 99.0





  97.4 97.4 99.0


 


Pulse 66 69 82 80


 


Resp  18 16 16


 


B/P (MAP) 133/59 115/64 (81) 129/63 (85) 110/58 (75)


 


Pulse Ox  98 98 97


 


O2 Delivery  Room Air Room Air Room Air


 


    





    





 11/4/18 11/4/18 11/4/18 11/5/18





 20:00 21:16 23:00 02:57


 


Temp   98.7 98.3





   98.7 98.3


 


Pulse  80 72 76


 


Resp   16 18


 


B/P (MAP)  110/58 118/68 (85) 101/56 (71)


 


Pulse Ox   98 95


 


O2 Delivery Room Air  Room Air Room Air


 


    





    





 11/5/18 11/5/18  





 06:57 08:00  


 


Temp 98.4   





 98.4   


 


Pulse 83   


 


Resp 18   


 


B/P (MAP) 112/61 (78)   


 


Pulse Ox 98   


 


O2 Delivery Room Air Room Air  














Intake and Output   


 


 11/4/18 11/4/18 11/5/18





 15:00 23:00 07:00


 


Intake Total 200 ml 400 ml 250 ml


 


Output Total 600 ml 950 ml 600 ml


 


Balance -400 ml -550 ml -350 ml

















DOLORES CALLEJAS MD Nov 5, 2018 09:26

## 2018-11-05 NOTE — PDOC
PROGRESS NOTES


Subjective


Subjective


Patient feels well this am with complaints of urinary frequency. No cardiac 

events overnight.





Objective


Objective





Vital Signs








  Date Time  Temp Pulse Resp B/P (MAP) Pulse Ox O2 Delivery O2 Flow Rate FiO2


 


11/5/18 06:57 98.4 83 18 112/61 (78) 98 Room Air  





 98.4       














Intake and Output 


 


 11/5/18





 07:00


 


Intake Total 850 ml


 


Output Total 2150 ml


 


Balance -1300 ml


 


 


 


Intake Oral 850 ml


 


Output Urine Total 2150 ml


 


# Voids 1











Physical Exam


Physical Exam


no significant change in cardiac exam





Plan


Plan of Care


Continue amiodarone 400mg PO BID


Recommend oncology consult based on bone scan





Comment


Review of Relevant


I have reviewed the following items falguni (where applicable) has been applied.


Labs





Laboratory Tests








Test


 11/3/18


09:50 11/3/18


09:59 11/3/18


12:33 11/4/18


04:45


 


White Blood Count


 6.9 x10^3/uL


(4.0-11.0) 


 


 6.8 x10^3/uL


(4.0-11.0)


 


Red Blood Count


 4.09 x10^6/uL


(4.30-5.70) 


 


 3.80 x10^6/uL


(4.30-5.70)


 


Hemoglobin


 11.7 g/dL


(13.0-17.5) 


 


 10.9 g/dL


(13.0-17.5)


 


Hematocrit


 35.5 %


(39.0-53.0) 


 


 32.7 %


(39.0-53.0)


 


Mean Corpuscular Volume 87 fL ()    86 fL () 


 


Mean Corpuscular Hemoglobin 29 pg (25-35)    29 pg (25-35) 


 


Mean Corpuscular Hemoglobin


Concent 33 g/dL


(31-37) 


 


 34 g/dL


(31-37)


 


Red Cell Distribution Width


 13.9 %


(11.5-14.5) 


 


 14.2 %


(11.5-14.5)


 


Platelet Count


 255 x10^3/uL


(140-400) 


 


 219 x10^3/uL


(140-400)


 


Neutrophils (%) (Auto) 77 % (31-73)    70 % (31-73) 


 


Lymphocytes (%) (Auto) 12 % (24-48)    16 % (24-48) 


 


Monocytes (%) (Auto) 6 % (0-9)    7 % (0-9) 


 


Eosinophils (%) (Auto) 4 % (0-3)    6 % (0-3) 


 


Basophils (%) (Auto) 1 % (0-3)    1 % (0-3) 


 


Neutrophils # (Auto)


 5.3 x10^3uL


(1.8-7.7) 


 


 4.8 x10^3uL


(1.8-7.7)


 


Lymphocytes # (Auto)


 0.8 x10^3/uL


(1.0-4.8) 


 


 1.1 x10^3/uL


(1.0-4.8)


 


Monocytes # (Auto)


 0.4 x10^3/uL


(0.0-1.1) 


 


 0.5 x10^3/uL


(0.0-1.1)


 


Eosinophils # (Auto)


 0.3 x10^3/uL


(0.0-0.7) 


 


 0.4 x10^3/uL


(0.0-0.7)


 


Basophils # (Auto)


 0.0 x10^3/uL


(0.0-0.2) 


 


 0.1 x10^3/uL


(0.0-0.2)


 


Prothrombin Time


 14.0 SEC


(11.7-14.0) 


 


 





 


Prothromb Time International


Ratio 1.1 (0.8-1.1) 


 


 


 





 


Sodium Level


 146 mmol/L


(136-145) 


 


 144 mmol/L


(136-145)


 


Potassium Level


 4.0 mmol/L


(3.5-5.1) 


 


 3.9 mmol/L


(3.5-5.1)


 


Chloride Level


 108 mmol/L


() 


 


 108 mmol/L


()


 


Carbon Dioxide Level


 31 mmol/L


(21-32) 


 


 28 mmol/L


(21-32)


 


Anion Gap 7 (6-14)    8 (6-14) 


 


Blood Urea Nitrogen


 22 mg/dL


(8-26) 


 


 21 mg/dL


(8-26)


 


Creatinine


 1.3 mg/dL


(0.7-1.3) 


 


 1.2 mg/dL


(0.7-1.3)


 


Estimated GFR


(Cockcroft-Gault) 53.0 


 


 


 58.1 





 


BUN/Creatinine Ratio 17 (6-20)    18 (6-20) 


 


Glucose Level


 83 mg/dL


(70-99) 


 


 85 mg/dL


(70-99)


 


Calcium Level


 9.7 mg/dL


(8.5-10.1) 


 


 9.2 mg/dL


(8.5-10.1)


 


Magnesium Level


 1.7 mg/dL


(1.8-2.4) 


 


 1.9 mg/dL


(1.8-2.4)


 


Total Bilirubin


 0.3 mg/dL


(0.2-1.0) 


 


 0.3 mg/dL


(0.2-1.0)


 


Aspartate Amino Transf


(AST/SGOT) 15 U/L (15-37) 


 


 


 12 U/L (15-37) 





 


Alanine Aminotransferase


(ALT/SGPT) 18 U/L (16-63) 


 


 


 12 U/L (16-63) 





 


Alkaline Phosphatase


 38 U/L


() 


 


 27 U/L


()


 


Creatine Kinase


 24 U/L


() 


 


 





 


Creatine Kinase MB (Mass)


 < 0.5 ng/mL


(0.0-3.6) 


 


 





 


Creatine Kinase MB Relative


Index  % (0-4) 


 


 


 





 


Troponin I Quantitative


 < 0.017 ng/mL


(0.000-0.055) 


 


 





 


NT-Pro-B-Type Natriuretic


Peptide 842 pg/mL


(0-449) 


 


 





 


Total Protein


 7.2 g/dL


(6.4-8.2) 


 


 6.1 g/dL


(6.4-8.2)


 


Albumin


 3.6 g/dL


(3.4-5.0) 


 


 3.0 g/dL


(3.4-5.0)


 


Albumin/Globulin Ratio 1.0 (1.0-1.7)    1.0 (1.0-1.7) 


 


Glucose (Fingerstick)


 


 89 mg/dL


(70-99) 


 





 


Urine Collection Type   Void  


 


Urine Color   Yellow  


 


Urine Clarity   Turbid  


 


Urine pH   7.5  


 


Urine Specific Gravity   1.020  


 


Urine Protein


 


 


 Negative mg/dL


(NEG-TRACE) 





 


Urine Glucose (UA)


 


 


 Negative mg/dL


(NEG) 





 


Urine Ketones (Stick)


 


 


 Negative mg/dL


(NEG) 





 


Urine Blood   Negative (NEG)  


 


Urine Nitrite   Negative (NEG)  


 


Urine Bilirubin   Negative (NEG)  


 


Urine Urobilinogen Dipstick


 


 


 1.0 mg/dL (0.2


mg/dL) 





 


Urine Leukocyte Esterase   Negative (NEG)  


 


Urine RBC   0 /HPF (0-2)  


 


Urine WBC   0 /HPF (0-4)  


 


Urine Amorphous Sediment   Present /HPF  


 


Urine Bacteria   0 /HPF (0-FEW)  


 


Thyroid Stimulating Hormone


(TSH) 


 


 


 1.067 uIU/mL


(0.358-3.74)


 


Free Thyroxine


 


 


 


 1.07 ng/dL


(0.76-1.46)


 


Free Triiodothyronine (T3)


pg/mL 


 


 


 1.68 pg/mL


(2.18-3.98)


 


Test


 11/4/18


11:35 11/5/18


05:30 


 





 


Urine Sodium


 122 mmol/L


(Not Estab.) 


 


 





 


Urine Potassium


 35.4 mmol/L


(Not Estab.) 


 


 





 


Urine Chloride


 96 mmol/L (Not


Estab.) 


 


 





 


Sodium Level


 


 144 mmol/L


(136-145) 


 





 


Potassium Level


 


 4.3 mmol/L


(3.5-5.1) 


 





 


Chloride Level


 


 107 mmol/L


() 


 





 


Carbon Dioxide Level


 


 29 mmol/L


(21-32) 


 





 


Anion Gap  8 (6-14)   


 


Blood Urea Nitrogen


 


 16 mg/dL


(8-26) 


 





 


Creatinine


 


 1.1 mg/dL


(0.7-1.3) 


 





 


Estimated GFR


(Cockcroft-Gault) 


 64.2 


 


 





 


Glucose Level


 


 89 mg/dL


(70-99) 


 





 


Calcium Level


 


 9.5 mg/dL


(8.5-10.1) 


 





 


Phosphorus Level


 


 3.5 mg/dL


(2.6-4.7) 


 





 


Magnesium Level


 


 1.8 mg/dL


(1.8-2.4) 


 











Laboratory Tests








Test


 11/4/18


11:35 11/5/18


05:30


 


Urine Sodium


 122 mmol/L


(Not Estab.) 





 


Urine Potassium


 35.4 mmol/L


(Not Estab.) 





 


Urine Chloride


 96 mmol/L (Not


Estab.) 





 


Sodium Level


 


 144 mmol/L


(136-145)


 


Potassium Level


 


 4.3 mmol/L


(3.5-5.1)


 


Chloride Level


 


 107 mmol/L


()


 


Carbon Dioxide Level


 


 29 mmol/L


(21-32)


 


Anion Gap  8 (6-14) 


 


Blood Urea Nitrogen


 


 16 mg/dL


(8-26)


 


Creatinine


 


 1.1 mg/dL


(0.7-1.3)


 


Estimated GFR


(Cockcroft-Gault) 


 64.2 





 


Glucose Level


 


 89 mg/dL


(70-99)


 


Calcium Level


 


 9.5 mg/dL


(8.5-10.1)


 


Phosphorus Level


 


 3.5 mg/dL


(2.6-4.7)


 


Magnesium Level


 


 1.8 mg/dL


(1.8-2.4)








Medications





Current Medications


Magnesium Sulfate 50 ml @ 25 mls/hr 1X  ONCE IV  Last administered on 11/3/18at 

11:00;  Start 11/3/18 at 11:00;  Stop 11/3/18 at 12:59;  Status DC


Amiodarone HCl 150 mg/Dextrose 103 ml @  600 mls/hr 1X  ONCE IV  Last 

administered on 11/3/18at 13:49;  Start 11/3/18 at 14:00;  Stop 11/3/18 at 14:10

;  Status DC


Amiodarone HCl 900 mg/Dextrose 518 ml @  33.33 mls/ hr CONT  PRN IV SEE I/O 

RECORD Last administered on 11/3/18at 13:49;  Start 11/3/18 at 14:30;  Stop 11/3

/18 at 14:30;  Status DC


Amiodarone HCl 900 mg/Dextrose 518 ml @  16.67 mls/ hr CONT  PRN IV SEE I/O 

RECORD;  Start 11/3/18 at 13:15;  Stop 11/4/18 at 07:14;  Status DC


Aspirin (Ecotrin) 325 mg DAILY16 PO  Last administered on 11/4/18at 16:35;  

Start 11/3/18 at 16:00


Atorvastatin Calcium (Lipitor) 20 mg DAILY16 PO ;  Start 11/3/18 at 16:00;  

Stop 11/3/18 at 16:12;  Status DC


Acetaminophen (Tylenol) 500 mg QHS PO  Last administered on 11/4/18at 21:16;  

Start 11/3/18 at 21:00


Magnesium Oxide (Magnesium Oxide) 400 mg BID PO  Last administered on 11/4/18at 

21:17;  Start 11/3/18 at 21:00


Metformin HCl (Glucophage) 500 mg BIDWMEALS PO  Last administered on 11/4/18at 

09:39;  Start 11/3/18 at 17:00;  Stop 11/4/18 at 10:24;  Status DC


Multivitamins (Thera M Plus) 1 tab DAILY PO  Last administered on 11/4/18at 09:

39;  Start 11/3/18 at 17:00


Non-Formulary Medication ([melatonin] ) 10 mg QHS PO ;  Start 11/3/18 at 21:00;

  Status UNV


Magnesium Sulfate/ Dextrose 100 ml @  100 mls/hr 1X  ONCE IV ;  Start 11/3/18 

at 15:00;  Stop 11/3/18 at 15:43;  Status DC


Diphenhydramine HCl (Benadryl) 25 mg QHS PO  Last administered on 11/4/18at 21:

16;  Start 11/3/18 at 21:00


Atorvastatin Calcium (Lipitor) 20 mg QHS PO  Last administered on 11/4/18at 21:

16;  Start 11/3/18 at 21:00


Amiodarone HCl (Cordarone) 400 mg BID PO  Last administered on 11/4/18at 21:16;

  Start 11/4/18 at 09:00


Iohexol (Omnipaque 240 Mg/ml) 50 ml 1X  ONCE PO  Last administered on 11/4/18at 

10:30;  Start 11/4/18 at 10:30;  Stop 11/4/18 at 10:31;  Status DC


Info (CONTRAST GIVEN -- Rx MONITORING) 1 each PRN DAILY  PRN MC SEE COMMENTS;  

Start 11/4/18 at 10:30;  Stop 11/6/18 at 10:29


Metformin HCl (Glucophage) 500 mg BIDWMEALS PO ;  Start 11/6/18 at 08:00





Active Scripts


Active


Mag-Oxide (Magnesium Oxide) 400 Mg Tablet 400 Mg PO BID 30 Days


Propafenone Hcl 150 Mg Tablet 150 Mg PO BID 30 Days


Reported


[melatonin]   10 Mg PO QHS


Centrum Silver Tablet (Multivits-Min/Fa/Lycopene/Lut) 1 Each Tablet 1 Tab  DAILY


Acetadryl 500-25 Mg Caplet (Acetaminophen/Diphenhydramine) 1 Each Tablet 1 Tab  

HS


Aspir-Vilma (Aspirin) 325 Mg Tablet.dr 1 Tab PO DAILY16


Metformin Hcl 500 Mg Tablet 1 Tab  BIDBFRMEAL


Atorvastatin Calcium 20 Mg Tablet 1 Tab  DAILY16


Vitals/I & O





Vital Sign - Last 24 Hours








 11/4/18 11/4/18 11/4/18 11/4/18





 09:39 11:27 15:46 19:55


 


Temp  97.4 97.4 99.0





  97.4 97.4 99.0


 


Pulse 66 69 82 80


 


Resp  18 16 16


 


B/P (MAP) 133/59 115/64 (81) 129/63 (85) 110/58 (75)


 


Pulse Ox  98 98 97


 


O2 Delivery  Room Air Room Air Room Air


 


    





    





 11/4/18 11/4/18 11/4/18 11/5/18





 20:00 21:16 23:00 02:57


 


Temp   98.7 98.3





   98.7 98.3


 


Pulse  80 72 76


 


Resp   16 18


 


B/P (MAP)  110/58 118/68 (85) 101/56 (71)


 


Pulse Ox   98 95


 


O2 Delivery Room Air  Room Air Room Air


 


    





    





 11/5/18   





 06:57   


 


Temp 98.4   





 98.4   


 


Pulse 83   


 


Resp 18   


 


B/P (MAP) 112/61 (78)   


 


Pulse Ox 98   


 


O2 Delivery Room Air   














Intake and Output   


 


 11/4/18 11/4/18 11/5/18





 15:00 23:00 07:00


 


Intake Total 200 ml 400 ml 250 ml


 


Output Total 600 ml 950 ml 600 ml


 


Balance -400 ml -550 ml -350 ml

















OSWALD COE MD Nov 5, 2018 09:05

## 2018-11-06 VITALS — SYSTOLIC BLOOD PRESSURE: 93 MMHG | DIASTOLIC BLOOD PRESSURE: 53 MMHG

## 2018-11-06 VITALS — SYSTOLIC BLOOD PRESSURE: 132 MMHG | DIASTOLIC BLOOD PRESSURE: 73 MMHG

## 2018-11-06 VITALS — SYSTOLIC BLOOD PRESSURE: 127 MMHG | DIASTOLIC BLOOD PRESSURE: 68 MMHG

## 2018-11-06 VITALS — SYSTOLIC BLOOD PRESSURE: 128 MMHG | DIASTOLIC BLOOD PRESSURE: 73 MMHG

## 2018-11-06 VITALS
SYSTOLIC BLOOD PRESSURE: 131 MMHG | SYSTOLIC BLOOD PRESSURE: 131 MMHG | DIASTOLIC BLOOD PRESSURE: 62 MMHG | DIASTOLIC BLOOD PRESSURE: 62 MMHG

## 2018-11-06 VITALS — SYSTOLIC BLOOD PRESSURE: 128 MMHG | DIASTOLIC BLOOD PRESSURE: 70 MMHG

## 2018-11-06 VITALS — SYSTOLIC BLOOD PRESSURE: 131 MMHG | DIASTOLIC BLOOD PRESSURE: 72 MMHG

## 2018-11-06 VITALS — SYSTOLIC BLOOD PRESSURE: 144 MMHG | DIASTOLIC BLOOD PRESSURE: 83 MMHG

## 2018-11-06 VITALS — SYSTOLIC BLOOD PRESSURE: 132 MMHG | DIASTOLIC BLOOD PRESSURE: 71 MMHG

## 2018-11-06 VITALS — DIASTOLIC BLOOD PRESSURE: 79 MMHG | SYSTOLIC BLOOD PRESSURE: 146 MMHG

## 2018-11-06 VITALS — DIASTOLIC BLOOD PRESSURE: 83 MMHG | SYSTOLIC BLOOD PRESSURE: 144 MMHG

## 2018-11-06 VITALS — DIASTOLIC BLOOD PRESSURE: 78 MMHG | SYSTOLIC BLOOD PRESSURE: 130 MMHG

## 2018-11-06 VITALS — SYSTOLIC BLOOD PRESSURE: 122 MMHG | DIASTOLIC BLOOD PRESSURE: 67 MMHG

## 2018-11-06 VITALS — SYSTOLIC BLOOD PRESSURE: 126 MMHG | DIASTOLIC BLOOD PRESSURE: 71 MMHG

## 2018-11-06 VITALS — SYSTOLIC BLOOD PRESSURE: 142 MMHG | DIASTOLIC BLOOD PRESSURE: 78 MMHG

## 2018-11-06 VITALS — DIASTOLIC BLOOD PRESSURE: 56 MMHG | SYSTOLIC BLOOD PRESSURE: 100 MMHG

## 2018-11-06 VITALS — SYSTOLIC BLOOD PRESSURE: 129 MMHG | DIASTOLIC BLOOD PRESSURE: 69 MMHG

## 2018-11-06 RX ADMIN — MULTIPLE VITAMINS W/ MINERALS TAB SCH TAB: TAB at 09:32

## 2018-11-06 RX ADMIN — MAGNESIUM OXIDE TAB 400 MG (241.3 MG ELEMENTAL MG) SCH MG: 400 (241.3 MG) TAB at 09:32

## 2018-11-06 RX ADMIN — AMIODARONE HYDROCHLORIDE SCH MG: 200 TABLET ORAL at 09:33

## 2018-11-06 RX ADMIN — ASPIRIN SCH MG: 325 TABLET, DELAYED RELEASE ORAL at 09:32

## 2018-11-06 NOTE — PDOC
PROGRESS NOTES


Subjective


Subjective


HPI - -f/u of  Bone lesion





ROS - no CP





Objective


Objective





Vital Signs








  Date Time  Temp Pulse Resp B/P (MAP) Pulse Ox O2 Delivery O2 Flow Rate FiO2


 


11/6/18 09:33  71  93/53    


 


11/6/18 08:00      Room Air  


 


11/6/18 07:33 97.5  16  98   





 97.5       














Intake and Output 


 


 11/6/18





 07:00


 


Intake Total 0 ml


 


Output Total 1925 ml


 


Balance -1925 ml


 


 


 


Intake Oral 0 ml


 


Output Urine Total 1925 ml


 


# Voids 1











Physical Exam


Heart:  Normal S1, Normal S2


General:  Alert, Oriented X3


Lungs:  Clear to auscultation


Neuro:  Normal speech


Psych/Mental Status:  Mental status NL





Assessment


Assessment


IMPRESSION AND PLAN:





1.  Bone lesion involving the posterior aspect of the left seventh rib, with a


differential diagnosis including metastatic disease versus old injury.  With a


history of prostate cancer and history of melanoma, I would pursue with a bone


biopsy for further evaluation.  I discussed with Dr. Jorge Alberto Barclay from


Interventional Radiology, who will review the scans.  I also discussed with a


registered nurse.  I will also order a PSA.  CT chest, abdomen and pelvis does


not reveal any definite malignancy.





I d/w IR


I d/w RN





2.  Prostate cancer at the age of 66, treated with radiation therapy.  PSA 

normal 0.18.





3.  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  This was


diagnosed at the age of 61.  No signs or symptoms of recurrent melanoma.  I will


await biopsy of the left seventh rib lesion.





Comment


Review of Relevant


I have reviewed the following items falguni (where applicable) has been applied.


Labs





Laboratory Tests








Test


 11/5/18


05:30


 


Sodium Level


 144 mmol/L


(136-145)


 


Potassium Level


 4.3 mmol/L


(3.5-5.1)


 


Chloride Level


 107 mmol/L


()


 


Carbon Dioxide Level


 29 mmol/L


(21-32)


 


Anion Gap 8 (6-14) 


 


Blood Urea Nitrogen


 16 mg/dL


(8-26)


 


Creatinine


 1.1 mg/dL


(0.7-1.3)


 


Estimated GFR


(Cockcroft-Gault) 64.2 





 


Glucose Level


 89 mg/dL


(70-99)


 


Calcium Level


 9.5 mg/dL


(8.5-10.1)


 


Phosphorus Level


 3.5 mg/dL


(2.6-4.7)


 


Magnesium Level


 1.8 mg/dL


(1.8-2.4)


 


Prostate Specific Antigen


 0.18 ng/mL


(0.00-4.00)








Medications





Current Medications


Magnesium Sulfate 50 ml @ 25 mls/hr 1X  ONCE IV  Last administered on 11/3/18at 

11:00;  Start 11/3/18 at 11:00;  Stop 11/3/18 at 12:59;  Status DC


Amiodarone HCl 150 mg/Dextrose 103 ml @  600 mls/hr 1X  ONCE IV  Last 

administered on 11/3/18at 13:49;  Start 11/3/18 at 14:00;  Stop 11/3/18 at 14:10

;  Status DC


Amiodarone HCl 900 mg/Dextrose 518 ml @  33.33 mls/ hr CONT  PRN IV SEE I/O 

RECORD Last administered on 11/3/18at 13:49;  Start 11/3/18 at 14:30;  Stop 11/3

/18 at 14:30;  Status DC


Amiodarone HCl 900 mg/Dextrose 518 ml @  16.67 mls/ hr CONT  PRN IV SEE I/O 

RECORD;  Start 11/3/18 at 13:15;  Stop 11/4/18 at 07:14;  Status DC


Aspirin (Ecotrin) 325 mg DAILY16 PO  Last administered on 11/6/18at 09:32;  

Start 11/3/18 at 16:00


Atorvastatin Calcium (Lipitor) 20 mg DAILY16 PO ;  Start 11/3/18 at 16:00;  

Stop 11/3/18 at 16:12;  Status DC


Acetaminophen (Tylenol) 500 mg QHS PO  Last administered on 11/5/18at 20:03;  

Start 11/3/18 at 21:00


Magnesium Oxide (Magnesium Oxide) 400 mg BID PO  Last administered on 11/6/18at 

09:32;  Start 11/3/18 at 21:00


Metformin HCl (Glucophage) 500 mg BIDWMEALS PO  Last administered on 11/4/18at 

09:39;  Start 11/3/18 at 17:00;  Stop 11/4/18 at 10:24;  Status DC


Multivitamins (Thera M Plus) 1 tab DAILY PO  Last administered on 11/6/18at 09:

32;  Start 11/3/18 at 17:00


Non-Formulary Medication ([melatonin] ) 10 mg QHS PO ;  Start 11/3/18 at 21:00;

  Status UNV


Magnesium Sulfate/ Dextrose 100 ml @  100 mls/hr 1X  ONCE IV ;  Start 11/3/18 

at 15:00;  Stop 11/3/18 at 15:43;  Status DC


Diphenhydramine HCl (Benadryl) 25 mg QHS PO  Last administered on 11/5/18at 20:

03;  Start 11/3/18 at 21:00


Atorvastatin Calcium (Lipitor) 20 mg QHS PO  Last administered on 11/5/18at 20:

03;  Start 11/3/18 at 21:00


Amiodarone HCl (Cordarone) 400 mg BID PO  Last administered on 11/6/18at 09:33;

  Start 11/4/18 at 09:00


Iohexol (Omnipaque 240 Mg/ml) 50 ml 1X  ONCE PO  Last administered on 11/4/18at 

10:30;  Start 11/4/18 at 10:30;  Stop 11/4/18 at 10:31;  Status DC


Info (CONTRAST GIVEN -- Rx MONITORING) 1 each PRN DAILY  PRN MC SEE COMMENTS;  

Start 11/4/18 at 10:30;  Stop 11/6/18 at 10:29;  Status DC


Metformin HCl (Glucophage) 500 mg BIDWMEALS PO ;  Start 11/6/18 at 08:00


Tamsulosin HCl (Flomax) 0.4 mg QHS PO  Last administered on 11/5/18at 20:03;  

Start 11/5/18 at 21:00





Active Scripts


Active


Mag-Oxide (Magnesium Oxide) 400 Mg Tablet 400 Mg PO BID 30 Days


Propafenone Hcl 150 Mg Tablet 150 Mg PO BID 30 Days


Reported


[melatonin]   10 Mg PO QHS


Centrum Silver Tablet (Multivits-Min/Fa/Lycopene/Lut) 1 Each Tablet 1 Tab  DAILY


Acetadryl 500-25 Mg Caplet (Acetaminophen/Diphenhydramine) 1 Each Tablet 1 Tab  

HS


Aspir-Vilma (Aspirin) 325 Mg Tablet. 1 Tab PO DAILY16


Metformin Hcl 500 Mg Tablet 1 Tab  BIDBFRMEAL


Atorvastatin Calcium 20 Mg Tablet 1 Tab  DAILY16


Vitals/I & O





Vital Sign - Last 24 Hours








 11/5/18 11/5/18 11/5/18 11/5/18





 12:34 14:23 18:16 20:02


 


Temp  97.6 97.9 





  97.6 97.9 


 


Pulse  84 83 83


 


Resp  22 20 


 


B/P (MAP)  119/73 (88) 147/68 (94) 147/68


 


Pulse Ox   98 


 


O2 Delivery Room Air Room Air Room Air 


 


    





    





 11/5/18 11/5/18 11/6/18 11/6/18





 20:20 22:30 02:31 07:33


 


Temp  97.9  97.5





  97.9  97.5


 


Pulse  76 80 71


 


Resp  20 20 16


 


B/P (MAP)  127/66 (86) 100/56 (71) 93/53 (66)


 


Pulse Ox  98 96 98


 


O2 Delivery Room Air Room Air Room Air Room Air


 


    





    





 11/6/18 11/6/18  





 08:00 09:33  


 


Pulse  71  


 


B/P (MAP)  93/53  


 


O2 Delivery Room Air   














Intake and Output   


 


 11/5/18 11/5/18 11/6/18





 15:00 23:00 07:00


 


Intake Total   0 ml


 


Output Total 100 ml 1025 ml 800 ml


 


Balance -100 ml -1025 ml -800 ml

















LEXI RICE MD Nov 6, 2018 11:52

## 2018-11-06 NOTE — PDOC2
STORM GAINES 11/6/18 1420:


GI CONSULT


Reason For Consult:


Weight loss





HPI:


HPI:


84 y/o male admitted w/ nonsustained ventricular tachycardia.  Has h/o prostate 

cancer and melanoma.  Bone scan showed small focus of activity on posterior 

left seventh rib.  D/w RN, plans to discharge this evening after bone biopsy - 

desire for outpt GI workup w/ weight loss and cancer history.





He reports ~50 pound weight loss over the past two years w/ waxing and waning 

appetite.  Occasional pill/food dysphagia but no reflux or n/v.  No abd pain, 

diarrhea, melena, or hematochezia.  Had some constipation after a 

hospitalization - attributed to an antibiotic - resolved.


Hgb 10.9 (stable since 9/2018), MCV 86.  On ASA.


CT A/P noted diverticulosis w/ some mild wall thickening of sigmoid colon, 

moderate stool, and small gallstone.


No previous EGD.  Reports normal colonoscopy ~2-3 years ago.  No liver or 

pancreas history.





PMH:


PMH:


CAD, HTN, DM, cardiomyopathy, prostate cancer s/p radiation, melanoma, OA, 

diverticulosis, gallstone, anxiety


angioplasty, CABG, pacemaker/AICD, prostatectomy, left eye enucleation





FH:


Family History:  Cancer, CAD, DM





Social History:


Smoke:  No


ALCOHOL:  occassional


Drugs:  None





ROS:





GEN: Denies fevers, chills, sweats


HEENT: Denies blurred vision, sore throat


CV: Denies chest pain


RESP: Denies shortness of air, cough


GI: Per HPI


: Denies hematuria, dysuria


ENDO: +weight loss


NEURO: Denies confusion, dizziness


MSK: Denies weakness, joint pain/swelling


SKIN: Denies jaundice, pruritus





Vitals:


Vitals:





 Vital Signs








  Date Time  Temp Pulse Resp B/P (MAP) Pulse Ox O2 Delivery O2 Flow Rate FiO2


 


11/6/18 13:55  79 11  100  2.0 


 


11/6/18 12:50      Room Air  


 


11/6/18 11:00 97.7   129/69 (89)    





 97.7       











Labs:


Labs:


Reviewed in [facility].





Allergies:


Coded Allergies:  


     Milk Containing Products (Verified  Allergy, Unknown, 10/22/18)





Medications:





Current Medications








 Medications


  (Trade)  Dose


 Ordered  Sig/Pedro Pablo


 Route


 PRN Reason  Start Time


 Stop Time Status Last Admin


Dose Admin


 


 Tamsulosin HCl


  (Flomax)  0.4 mg  QHS


 PO


   11/5/18 21:00


    11/5/18 20:03














Imaging:


Imaging:


CXR


IMPRESSION:  No radiographic evidence of an acute cardiopulmonary process.





Bone Scan


IMPRESSION:


1. Small focus of activity posterior left seventh rib corresponding to a 

blastic or sclerotic lesion on the CT, possible bony metastatic disease, an old 

injury would be possible.


2. Slight activity at the costovertebral junctions on the left metastatic 

disease would be unusual in this pattern or old trauma or arthritis can have 

this pattern.





Chest/A/P CT


IMPRESSION:


1. Diverticulosis the colon without diverticulitis. There is some mild wall 

thickening of the sigmoid colon which could be diverticulitis although 

colonoscopy could be of benefit.


2. Moderate stool in the colon.


3. No bowel obstruction.


4. Small gallstone 


5. Small abdominal aortic aneurysm.


6. No other abdominal or pelvic mass noted.





PE:





GEN: NAD


HEENT: Atraumatic, PERRL


LUNGS: CTAB


HEART: RRR


ABD: NABS, S/ND/NT


EXTREMITY: No edema


SKIN: No rashes, no jaundice


NEURO/PSYCH: A & O 3





A/P:


A/P:


NSVT


Left seventh rib lesion, h/o prostate cancer and melanoma


Weight loss, decreased appetite, occasional dysphagia


Anemia


Diverticulosis - noted on CT w/ mild sigmoid wall thickening


Cholelithiasis - gallstone noted on CT, incidental finding





--


Check anemia parameters.


DC per primary.


Outpt EGD and colonoscopy after bone biopsy results - our office will contact.





ROSY SPANN MD 11/6/18 1522:








STORM GAINES Nov 6, 2018 14:20


ROSY SPANN MD Nov 6, 2018 15:22

## 2018-11-06 NOTE — PDOC
SUBJECTIVE


ROS


Follow-up for hypomagnesemia





Patient denies new complaints





OBJECTIVE


Vital Signs





Vital Signs








  Date Time  Temp Pulse Resp B/P (MAP) Pulse Ox O2 Delivery O2 Flow Rate FiO2


 


11/6/18 09:33  71  93/53    


 


11/6/18 07:33 97.5  16  98 Room Air  





 97.5       








I & 0











Intake and Output 


 


 11/6/18





 07:00


 


Intake Total 0 ml


 


Output Total 1925 ml


 


Balance -1925 ml


 


 


 


Intake Oral 0 ml


 


Output Urine Total 1925 ml


 


# Voids 1











PHYSICAL EXAM


Physical Exam


General Appearance:          


Awake:      Alert Oriented  x 3 


Neck:    No JVD or JVP


Chest:    CTA Lester


Heart:    S1 S2


Abdomen -    Soft NTND


Extremities -    No Edema











DIAGNOSIS/ASSESSMENT


Assessment & Plan


Hypomagnesemia: Without any obvious etiology. Possible contribution from  

diabetes associated renal losses cannot be ruled out. Magnesium levels appear 

to have held up pretty well with oral supplementation alone as ongoing. These 

may need to be increased as an outpatient. I discussed the option of IV 

magnesium infusions if oral pills do not work. He denies diarrhea which may 

explain GI losses





Lowish blood pressure: Will be deferred to Dr. Zhong and Dr. Hou





COMMENT/RELEVANT DATA


Meds





Current Medications








 Medications


  (Trade)  Dose


 Ordered  Sig/Pedro Pablo  Start Time


 Stop Time Status Last Admin


Dose Admin


 


 Acetaminophen


  (Tylenol)  500 mg  QHS  11/3/18 21:00


    11/5/18 20:03


500 MG


 


 Amiodarone HCl


  (Cordarone)  400 mg  BID  11/4/18 09:00


    11/6/18 09:33


400 MG


 


 Amiodarone HCl


 150 mg/Dextrose  103 ml @ 


 600 mls/hr  1X  ONCE  11/3/18 14:00


 11/3/18 14:10 DC 11/3/18 13:49


600 MLS/HR


 


 Amiodarone HCl


 900 mg/Dextrose  518 ml @ 


 16.67 mls/


 hr  CONT  PRN  11/3/18 13:15


 11/4/18 07:14 DC  





 


 Aspirin


  (Ecotrin)  325 mg  DAILY16  11/3/18 16:00


    11/6/18 09:32


325 MG


 


 Atorvastatin


 Calcium


  (Lipitor)  20 mg  QHS  11/3/18 21:00


    11/5/18 20:03


20 MG


 


 Diphenhydramine


 HCl


  (Benadryl)  25 mg  QHS  11/3/18 21:00


    11/5/18 20:03


25 MG


 


 Info


  (CONTRAST GIVEN


 -- Rx MONITORING)  1 each  PRN DAILY  PRN  11/4/18 10:30


 11/6/18 10:29   





 


 Iohexol


  (Omnipaque 240


 Mg/ml)  50 ml  1X  ONCE  11/4/18 10:30


 11/4/18 10:31 DC 11/4/18 10:30


50 ML


 


 Magnesium Oxide


  (Magnesium Oxide)  400 mg  BID  11/3/18 21:00


    11/6/18 09:32


400 MG


 


 Magnesium Sulfate  50 ml @ 25


 mls/hr  1X  ONCE  11/3/18 11:00


 11/3/18 12:59 DC 11/3/18 11:00


25 MLS/HR


 


 Magnesium Sulfate/


 Dextrose  100 ml @ 


 100 mls/hr  1X  ONCE  11/3/18 15:00


 11/3/18 15:43 DC  





 


 Metformin HCl


  (Glucophage)  500 mg  BIDWMEALS  11/6/18 08:00


     





 


 Multivitamins


  (Thera M Plus)  1 tab  DAILY  11/3/18 17:00


    11/6/18 09:32


1 TAB


 


 Non-Formulary


 Medication


  ([melatonin] )  10 mg  QHS  11/3/18 21:00


   UNV  





 


 Tamsulosin HCl


  (Flomax)  0.4 mg  QHS  11/5/18 21:00


    11/5/18 20:03


0.4 MG








Results


All relevant outside records, renal labs, imaging studies, telemetry/EKG's were 

reviewed.











ALICIA NUNN MD Nov 6, 2018 10:07

## 2018-11-06 NOTE — DS
DATE OF DISCHARGE:  11/06/2018



ADMITTING DIAGNOSIS:  Nonsustained ventricular tachycardia.



DISMISSAL DIAGNOSIS:  Nonsustained ventricular tachycardia.



SECONDARY DIAGNOSES:

1.  Hypomagnesemia.

2.  Moderate protein malnutrition.

3.  Orthostatic hypotension.

4.  Coronary artery disease.

5.  Progressive weight loss.

6.  Seventh rib bone lesion.

7.  Type 2 diabetes.

8.  Hypertension.

9.  High cholesterol.

10.  Lumbar spinal stenosis.

11.  Sick sinus syndrome with implantable cardioverter-defibrillator and

pacemaker placement.

12.  Benign prostatic hypertrophy.

13.  Remote history of prostate cancer.

14.  Remote history of left eye melanoma.

15.  Chronic kidney disease stage 3.



HISTORY OF PRESENT ILLNESS AND HOSPITAL COURSE:  This patient is an 83-year-old

 male who had recurrent admission after ICD discharge and pacemaker

interrogation revealed nonsustained ventricular tachycardia.  The patient has

had multiple hospitalizations due to arrhythmias and has been switched from

mexiletine to Cardizem to Rythmol without significant improvement and recurrent

episodes of SVT and V-tach.  He was placed on amiodarone during this

hospitalization and stabilized with normal sinus rhythm with decreased episodes

of V-tach.  The patient was also found to have a lesion on x-ray and bone scan

did reveal possible metastatic disease versus old trauma.  Biopsy was done prior

to discharge.  Further metastatic evaluation will be done as an outpatient.  The

patient was back to baseline and tolerating medications well.  Therefore, he was

discharged to home on the following medications:  Amiodarone 400 mg b.i.d.,

Flomax 0.4 at bedtime, Tylenol PM at bedtime, aspirin 325 mg daily, atorvastatin

20 mg daily, magnesium oxide 400 mg b.i.d., metformin 500 mg b.i.d.,

multivitamin daily.  Rythmol was discontinued.  The patient did have frequent

urination at night, resolved with Flomax, which was again restarted.



DISCHARGE INSTRUCTIONS:  The patient will follow up in 1 week in general

practice clinic and follow up with GI Medicine for outpatient EGD and

colonoscopy.

 



______________________________

DOLORES CALLEJAS MD



DR:  SAPNA/brenda  JOB#:  [id number] / [id number]

DD:  11/06/2018 18:01  DT:  11/06/2018 20:59

## 2018-11-06 NOTE — PDOC
PROGRESS NOTES


Subjective


Subjective


Patient has no complaints this am. Denies any chest pain or dyspnea.





Objective


Objective





Vital Signs








  Date Time  Temp Pulse Resp B/P (MAP) Pulse Ox O2 Delivery O2 Flow Rate FiO2


 


11/6/18 09:33  71  93/53    


 


11/6/18 08:00      Room Air  


 


11/6/18 07:33 97.5  16  98   





 97.5       














Intake and Output 


 


 11/6/18





 07:00


 


Intake Total 0 ml


 


Output Total 1925 ml


 


Balance -1925 ml


 


 


 


Intake Oral 0 ml


 


Output Urine Total 1925 ml


 


# Voids 1











Physical Exam


Physical Exam


No significant cardiac changes.





Assessment


Assessment


Ventricular Tachycardia





Plan


Plan of Care


Pt has been switched to PO amiodarone. Pt's rhythm is stable.





Oncology has him scheduled for a biopsy s/p bone scan results.





Will continue to follow.





Comment


Review of Relevant


I have reviewed the following items falguni (where applicable) has been applied.


Labs





Laboratory Tests








Test


 11/4/18


11:35 11/5/18


05:30


 


Urine Sodium


 122 mmol/L


(Not Estab.) 





 


Urine Potassium


 35.4 mmol/L


(Not Estab.) 





 


Urine Chloride


 96 mmol/L (Not


Estab.) 





 


Sodium Level


 


 144 mmol/L


(136-145)


 


Potassium Level


 


 4.3 mmol/L


(3.5-5.1)


 


Chloride Level


 


 107 mmol/L


()


 


Carbon Dioxide Level


 


 29 mmol/L


(21-32)


 


Anion Gap  8 (6-14) 


 


Blood Urea Nitrogen


 


 16 mg/dL


(8-26)


 


Creatinine


 


 1.1 mg/dL


(0.7-1.3)


 


Estimated GFR


(Cockcroft-Gault) 


 64.2 





 


Glucose Level


 


 89 mg/dL


(70-99)


 


Calcium Level


 


 9.5 mg/dL


(8.5-10.1)


 


Phosphorus Level


 


 3.5 mg/dL


(2.6-4.7)


 


Magnesium Level


 


 1.8 mg/dL


(1.8-2.4)


 


Prostate Specific Antigen


 


 0.18 ng/mL


(0.00-4.00)








Medications





Current Medications


Magnesium Sulfate 50 ml @ 25 mls/hr 1X  ONCE IV  Last administered on 11/3/18at 

11:00;  Start 11/3/18 at 11:00;  Stop 11/3/18 at 12:59;  Status DC


Amiodarone HCl 150 mg/Dextrose 103 ml @  600 mls/hr 1X  ONCE IV  Last 

administered on 11/3/18at 13:49;  Start 11/3/18 at 14:00;  Stop 11/3/18 at 14:10

;  Status DC


Amiodarone HCl 900 mg/Dextrose 518 ml @  33.33 mls/ hr CONT  PRN IV SEE I/O 

RECORD Last administered on 11/3/18at 13:49;  Start 11/3/18 at 14:30;  Stop 11/3

/18 at 14:30;  Status DC


Amiodarone HCl 900 mg/Dextrose 518 ml @  16.67 mls/ hr CONT  PRN IV SEE I/O 

RECORD;  Start 11/3/18 at 13:15;  Stop 11/4/18 at 07:14;  Status DC


Aspirin (Ecotrin) 325 mg DAILY16 PO  Last administered on 11/6/18at 09:32;  

Start 11/3/18 at 16:00


Atorvastatin Calcium (Lipitor) 20 mg DAILY16 PO ;  Start 11/3/18 at 16:00;  

Stop 11/3/18 at 16:12;  Status DC


Acetaminophen (Tylenol) 500 mg QHS PO  Last administered on 11/5/18at 20:03;  

Start 11/3/18 at 21:00


Magnesium Oxide (Magnesium Oxide) 400 mg BID PO  Last administered on 11/6/18at 

09:32;  Start 11/3/18 at 21:00


Metformin HCl (Glucophage) 500 mg BIDWMEALS PO  Last administered on 11/4/18at 

09:39;  Start 11/3/18 at 17:00;  Stop 11/4/18 at 10:24;  Status DC


Multivitamins (Thera M Plus) 1 tab DAILY PO  Last administered on 11/6/18at 09:

32;  Start 11/3/18 at 17:00


Non-Formulary Medication ([melatonin] ) 10 mg QHS PO ;  Start 11/3/18 at 21:00;

  Status UNV


Magnesium Sulfate/ Dextrose 100 ml @  100 mls/hr 1X  ONCE IV ;  Start 11/3/18 

at 15:00;  Stop 11/3/18 at 15:43;  Status DC


Diphenhydramine HCl (Benadryl) 25 mg QHS PO  Last administered on 11/5/18at 20:

03;  Start 11/3/18 at 21:00


Atorvastatin Calcium (Lipitor) 20 mg QHS PO  Last administered on 11/5/18at 20:

03;  Start 11/3/18 at 21:00


Amiodarone HCl (Cordarone) 400 mg BID PO  Last administered on 11/6/18at 09:33;

  Start 11/4/18 at 09:00


Iohexol (Omnipaque 240 Mg/ml) 50 ml 1X  ONCE PO  Last administered on 11/4/18at 

10:30;  Start 11/4/18 at 10:30;  Stop 11/4/18 at 10:31;  Status DC


Info (CONTRAST GIVEN -- Rx MONITORING) 1 each PRN DAILY  PRN MC SEE COMMENTS;  

Start 11/4/18 at 10:30;  Stop 11/6/18 at 10:29;  Status DC


Metformin HCl (Glucophage) 500 mg BIDWMEALS PO ;  Start 11/6/18 at 08:00


Tamsulosin HCl (Flomax) 0.4 mg QHS PO  Last administered on 11/5/18at 20:03;  

Start 11/5/18 at 21:00





Active Scripts


Active


Mag-Oxide (Magnesium Oxide) 400 Mg Tablet 400 Mg PO BID 30 Days


Propafenone Hcl 150 Mg Tablet 150 Mg PO BID 30 Days


Reported


[melatonin]   10 Mg PO QHS


Centrum Silver Tablet (Multivits-Min/Fa/Lycopene/Lut) 1 Each Tablet 1 Tab  DAILY


Acetadryl 500-25 Mg Caplet (Acetaminophen/Diphenhydramine) 1 Each Tablet 1 Tab  

HS


Aspir-Vilma (Aspirin) 325 Mg Tablet. 1 Tab PO DAILY16


Metformin Hcl 500 Mg Tablet 1 Tab  BIDBFRMEAL


Atorvastatin Calcium 20 Mg Tablet 1 Tab  DAILY16


Vitals/I & O





Vital Sign - Last 24 Hours








 11/5/18 11/5/18 11/5/18 11/5/18





 12:34 14:23 18:16 20:02


 


Temp  97.6 97.9 





  97.6 97.9 


 


Pulse  84 83 83


 


Resp  22 20 


 


B/P (MAP)  119/73 (88) 147/68 (94) 147/68


 


Pulse Ox   98 


 


O2 Delivery Room Air Room Air Room Air 


 


    





    





 11/5/18 11/5/18 11/6/18 11/6/18





 20:20 22:30 02:31 07:33


 


Temp  97.9  97.5





  97.9  97.5


 


Pulse  76 80 71


 


Resp  20 20 16


 


B/P (MAP)  127/66 (86) 100/56 (71) 93/53 (66)


 


Pulse Ox  98 96 98


 


O2 Delivery Room Air Room Air Room Air Room Air


 


    





    





 11/6/18 11/6/18  





 08:00 09:33  


 


Pulse  71  


 


B/P (MAP)  93/53  


 


O2 Delivery Room Air   














Intake and Output   


 


 11/5/18 11/5/18 11/6/18





 15:00 23:00 07:00


 


Intake Total   0 ml


 


Output Total 100 ml 1025 ml 800 ml


 


Balance -100 ml -1025 ml -800 ml

















OSWALD COE MD Nov 6, 2018 11:36

## 2018-11-06 NOTE — CONS
DATE OF CONSULTATION:  11/05/2018



MEDICAL ONCOLOGY CONSULTATION REPORT



REQUESTING PHYSICIAN:  Dr. Juliocesar Thrasher.



REASON FOR CONSULTATION:  Left seventh rib lesion, concerning for metastatic

disease.



HISTORY OF PRESENT ILLNESS:  The patient is an 83-year-old  gentleman

who has a history of prostate cancer at the age of 66, treated with radiation

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

for which he required enucleation of the left eye.  He has history of weight

loss of about 50 pounds between 6588-3006.  He reports that his weight then

stabilized.



He also has a history of long-standing heart disease and he has an AICD in place

and he felt that he was going to have a spell where it might fire again, and

hence, he came into the hospital.



He underwent further evaluation with Cardiology.  He was advised to continue

amiodarone.



The patient underwent a CT scan of the chest, abdomen and pelvis on 11/04/2018

to evaluate weight loss.  There were no malignancies noted.  There was focal

infiltrate in the medial right lower lobe, but a possible mass cannot be ruled

out in that region and a followup scan was recommended.  He underwent a bone

scan on 11/04/2018, which revealed small focus of activity in posterior left

seventh rib, corresponding to a blastic or sclerotic lesion on the CT scan and

bony metastasis versus old injury was in the differential diagnosis.  Hence, I

was consulted for further evaluation.



PAST MEDICAL HISTORY:  Prostate cancer at the age of 66, treated with radiation

therapy.  Melanoma at the age of 61, treated with enucleation of the left eye. 

Type 2 diabetes, heart disease and hypertension.



PAST SURGICAL HISTORY:  Angioplasty, coronary artery bypass, pacemaker, AICD,

prostatectomy and left eye enucleation.



FAMILY HISTORY:  Positive for heart disease and diabetes.  Son has cancer of

unknown type.



SOCIAL HISTORY:  He drinks beer occasionally.  No smoking.



REVIEW OF SYSTEMS:  A 12-point review of system was performed.  Pertinent

positives are mentioned in the history of present illness.  Rest of the system

review is negative.



PHYSICAL EXAMINATION:

GENERAL APPEARANCE:  The patient is an 83-year-old  gentleman who is in

no acute cardiorespiratory distress.

VITAL SIGNS:  Blood pressure 119/73, temperature 97.6.

HEENT:  Atraumatic, normocephalic.  Eyes, no icterus.

NECK:  Supple.

CHEST:  Bilaterally symmetrical.  No crepitations or rhonchi heard.

HEART:  S1, S2 normal.

ABDOMEN:  Soft, nontender.

CENTRAL NERVOUS SYSTEM:  No focal deficits.

LYMPHATICS:  No lymphadenopathy.

MUSCULOSKELETAL:  No joint effusions.  No rib tenderness.



LABORATORY DATA:  WBC 6.8, hemoglobin 10.9 and platelet count 219,000. 

Creatinine 1.1.  Total protein 6.1, albumin 3.0, calcium is 9.5.



IMPRESSION AND PLAN:

1.  Bone lesion involving the posterior aspect of the left seventh rib, with a

differential diagnosis including metastatic disease versus old injury.  With a

history of prostate cancer and history of melanoma, I would pursue with a bone

biopsy for further evaluation.  I discussed with Dr. Jorge Alberto Barclay from

Interventional Radiology, who will review the scans.  I also discussed with a

registered nurse.  I will also order a PSA.  CT chest, abdomen and pelvis does

not reveal any definite malignancy.

2.  Prostate cancer at the age of 66, treated with radiation therapy.  I will

obtain PSA.  He reports that his last PSA was 0.1.

3.  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  This was

diagnosed at the age of 61.  No signs or symptoms of recurrent melanoma.  I will

await biopsy of the left seventh rib lesion.

 



______________________________

LEXI RICE MD DR:  ISA/brenda  JOB#:  [id number] / [id number]

DD:  11/05/2018 17:03  DT:  11/06/2018 10:43

## 2018-11-07 NOTE — RAD
CT-guided biopsy, left seventh rib, sclerotic lesion 11/6/2018



Discussion: The risks and benefits of the procedure were discussed the

patient.  Informed consent was obtained.  A timeout procedure was performed. 

The left thorax prepped and draped using sterile barrier technique.  CT

imaging redemonstrates a small sclerotic lesion in the posterior lateral  

left seventh rib.  A 17-gauge needles manipulated into the rib, at the level

of this lesion.  18-gauge core biopsy samples were obtained and placed in

formalin.  Needles were removed and manual pressure was held.  No immediate

complications were identified.



The procedure was performed under conscious sedation including continuous

cardiopulmonary monitoring via a dedicated sedation nurse.  Face-to-face

sedation time: 45 minutes



Impression: CT-guided biopsy, sclerotic lesion left seventh rib

 



PQRS Compliance Statement:



One or more of the following individualized dose reduction techniques were

utilized for this examination:  

1. Automated exposure control  

2. Adjustment of the mA and/or kV according to patient size  

3. Use of iterative reconstruction technique

## 2018-11-08 NOTE — PATHOLOGY
[facility] Accession Number: [id number]

.                                                                01

Material submitted:                                        .

LEFT 7TH RIB LESION BIOPSY

.                                                                01

Clinical history:                                          .

Left 7th rib lesion

.

.                                                                02

**********************************************************************

Diagnosis:

Left 7th rib needle biopsies:

- Blood clot focally containing hematopoietic cellular elements.

[facility]/11/08/2018

**********************************************************************

.                                                                02

Comment:

There is no evidence of malignancy.

([facility]:\[facility]\"" 11/08/2018)

.                                                                02

Electronically signed:                                     .

Kwame Hill MD, Pathologist

NPI- [id number]

.                                                                01

Gross description:                                         .

Received in formalin labeled "Baldomero Paul, L 7th rib BX," are two

needle cores of dark brown soft tissue ranging from 1.0 to 1.3 cm in

length and measuring 0.1 cm each in diameter admixed with smaller

fragments of tan-brown possible soft tissue.  Bone is not identified

grossly.  The specimen is submitted entirely in cassettes A1 and A2.

([facility]; 11/7/2018)

XDC/XDC

.                                                                02

Pathologist provided ICD-10:

M79.9

.                                                                02

CPT                                                        .

[id number]

Specimen Comment: A courtesy copy of this report has been sent to

Specimen Comment: [phone], [phone], [phone], [phone].

Specimen Comment: Report sent to ,DR WALTON,DR JÚNIOR RICE

Specimen Comment: A duplicate report has been generated due to demographic updates.

***Performed at:  01

   [facility]

   [address], [place]  [id number]

   MD Sanjay Aparicio MD Phone:  [phone]

***Performed at:  02

   [facility]  [id number]

   MD Kwame Hill MD Phone:  [phone]

## 2019-04-03 ENCOUNTER — HOSPITAL ENCOUNTER (OUTPATIENT)
Dept: HOSPITAL 61 - SLPLAB | Age: 84
Discharge: HOME | End: 2019-04-03
Payer: MEDICARE

## 2019-04-03 DIAGNOSIS — G47.33: Primary | ICD-10-CM

## 2019-04-03 DIAGNOSIS — G47.34: ICD-10-CM

## 2019-04-03 PROCEDURE — 95810 POLYSOM 6/> YRS 4/> PARAM: CPT

## 2019-04-04 NOTE — SLEEP
DATE OF STUDY:  04/03/2019



ATTENDING PHYSICIAN:  Ridge [place].



The patient is an 83-year-old who weighs 135 pounds with a BMI of 22.  The

patient's [place] score was 6.  The patient underwent split night study at

[facility].



During the night study, the patient spent 447 minutes in bed and slept for 210

minutes with a low sleep efficiency of 47%.  Sleep latency was 18 minutes with

an absent REM sleep.  Overall, sleep architecture showed increased stage 1 and

stage 2 sleep, absent slow wave and absent REM sleep.



During the initial diagnostic portion of the study, the patient slept for 134

minutes.  During that time, there were 9 obstructive apneas, 66 mixed apneas, no

central apneas and 21 hypopneas.  The patient's apnea hypopnea index was 43 per

hour.  Supine index 58 per hour.  REM sleep was not observed.



PLMS were seen at index of 2 per hour and 1 per hour caused EEG arousals.



EKG monitoring revealed an average heart rate of 63 beats per minute.  No

arrhythmias observed.



Nocturnal oximetry study revealed a mean oxygen saturation 91% with the lowest

of 86%.  13% of time oxygen saturation remained between 80% and 89%.



The patient met the criteria for CPAP initiation, which was started at 5 cm

water and titrated up to 9 cm water.  There were some central apneas observed at

higher pressure.  The best results were seen at a CPAP pressure of 7 cm water. 

The patient had 41 minutes of sleep.  The patient had a supine sleep, but no REM

sleep observed.  The patient's AHI was 7 per hour, mostly from few central

apneas from mask leak.  Oxygen saturation remained above 93%.  The patient used

a full face mask.



IMPRESSION:

1.  Severe sleep apnea-hypopnea syndrome at an AHI of 43 per hour.

2.  Nocturnal hypoxia secondary to obstructive sleep apnea, but resolved with

CPAP.

3.  No clinically significant PLMS.



RECOMMENDATIONS:

1.  CPAP at 7 cm water should be used on a nightly basis.

2.  Follow up in 4-6 weeks to assess compliance with CPAP and to document

clinical improvement.

3.  Avoid CNS depressants.

4.  Caution regarding driving until symptoms of sleep apnea resolve with the use

of CPAP.

 



______________________________

MICHELE ARREGUIN MD



DR:  JAVI/brenda  JOB#:  [id number] / [id number]

DD:  04/04/2019 10:19  DT:  04/04/2019 12:34

## 2019-10-31 ENCOUNTER — HOSPITAL ENCOUNTER (EMERGENCY)
Dept: HOSPITAL 61 - ER | Age: 84
Discharge: LEFT BEFORE DISCHARGE | End: 2019-10-31
Payer: MEDICARE

## 2019-10-31 VITALS — WEIGHT: 145 LBS | BODY MASS INDEX: 23.3 KG/M2 | HEIGHT: 66 IN

## 2019-10-31 VITALS — DIASTOLIC BLOOD PRESSURE: 73 MMHG | SYSTOLIC BLOOD PRESSURE: 160 MMHG

## 2019-10-31 DIAGNOSIS — Z53.21: ICD-10-CM

## 2019-10-31 DIAGNOSIS — H57.89: Primary | ICD-10-CM

## 2019-11-25 ENCOUNTER — HOSPITAL ENCOUNTER (INPATIENT)
Dept: HOSPITAL 61 - ER | Age: 84
LOS: 2 days | Discharge: HOME | DRG: 871 | End: 2019-11-27
Attending: INTERNAL MEDICINE | Admitting: INTERNAL MEDICINE
Payer: MEDICARE

## 2019-11-25 VITALS — DIASTOLIC BLOOD PRESSURE: 47 MMHG | SYSTOLIC BLOOD PRESSURE: 128 MMHG

## 2019-11-25 VITALS — HEIGHT: 66 IN | WEIGHT: 148.31 LBS | BODY MASS INDEX: 23.83 KG/M2

## 2019-11-25 VITALS — SYSTOLIC BLOOD PRESSURE: 131 MMHG | DIASTOLIC BLOOD PRESSURE: 48 MMHG

## 2019-11-25 DIAGNOSIS — Z95.1: ICD-10-CM

## 2019-11-25 DIAGNOSIS — Z82.49: ICD-10-CM

## 2019-11-25 DIAGNOSIS — Z85.840: ICD-10-CM

## 2019-11-25 DIAGNOSIS — I10: ICD-10-CM

## 2019-11-25 DIAGNOSIS — J18.9: ICD-10-CM

## 2019-11-25 DIAGNOSIS — D64.9: ICD-10-CM

## 2019-11-25 DIAGNOSIS — I48.91: ICD-10-CM

## 2019-11-25 DIAGNOSIS — A41.9: Primary | ICD-10-CM

## 2019-11-25 DIAGNOSIS — Z97.4: ICD-10-CM

## 2019-11-25 DIAGNOSIS — Z66: ICD-10-CM

## 2019-11-25 DIAGNOSIS — Z87.440: ICD-10-CM

## 2019-11-25 DIAGNOSIS — Z95.5: ICD-10-CM

## 2019-11-25 DIAGNOSIS — Z87.01: ICD-10-CM

## 2019-11-25 DIAGNOSIS — I25.10: ICD-10-CM

## 2019-11-25 DIAGNOSIS — R65.20: ICD-10-CM

## 2019-11-25 DIAGNOSIS — E83.42: ICD-10-CM

## 2019-11-25 DIAGNOSIS — M19.90: ICD-10-CM

## 2019-11-25 DIAGNOSIS — N17.9: ICD-10-CM

## 2019-11-25 DIAGNOSIS — Z95.0: ICD-10-CM

## 2019-11-25 DIAGNOSIS — E87.2: ICD-10-CM

## 2019-11-25 DIAGNOSIS — E11.40: ICD-10-CM

## 2019-11-25 DIAGNOSIS — Z87.891: ICD-10-CM

## 2019-11-25 DIAGNOSIS — Z88.2: ICD-10-CM

## 2019-11-25 DIAGNOSIS — Z85.46: ICD-10-CM

## 2019-11-25 DIAGNOSIS — I25.2: ICD-10-CM

## 2019-11-25 DIAGNOSIS — Z91.011: ICD-10-CM

## 2019-11-25 DIAGNOSIS — Z98.49: ICD-10-CM

## 2019-11-25 DIAGNOSIS — K21.9: ICD-10-CM

## 2019-11-25 DIAGNOSIS — Z79.01: ICD-10-CM

## 2019-11-25 LAB
% ATYL: 2 % (ref 0–0)
% BANDS: 19 % (ref 0–9)
% LYMPHS: 6 % (ref 24–48)
% MONOS: 7 % (ref 0–10)
% SEGS: 66 % (ref 35–66)
ALBUMIN SERPL-MCNC: 3.5 G/DL (ref 3.4–5)
ALBUMIN/GLOB SERPL: 0.9 {RATIO} (ref 1–1.7)
ALP SERPL-CCNC: 65 U/L (ref 46–116)
ALT SERPL-CCNC: 35 U/L (ref 16–63)
ANION GAP SERPL CALC-SCNC: 12 MMOL/L (ref 6–14)
ANISOCYTOSIS BLD QL SMEAR: SLIGHT
APTT BLD: 39 SEC (ref 24–38)
APTT PPP: YELLOW S
AST SERPL-CCNC: 40 U/L (ref 15–37)
BACTERIA #/AREA URNS HPF: 0 /HPF
BASOPHILS # BLD AUTO: 0 X10^3/UL (ref 0–0.2)
BASOPHILS NFR BLD: 0 % (ref 0–3)
BILIRUB SERPL-MCNC: 0.7 MG/DL (ref 0.2–1)
BILIRUB UR QL STRIP: NEGATIVE
BUN SERPL-MCNC: 24 MG/DL (ref 8–26)
BUN/CREAT SERPL: 16 (ref 6–20)
CALCIUM SERPL-MCNC: 9 MG/DL (ref 8.5–10.1)
CHLORIDE SERPL-SCNC: 97 MMOL/L (ref 98–107)
CO2 SERPL-SCNC: 25 MMOL/L (ref 21–32)
CREAT SERPL-MCNC: 1.5 MG/DL (ref 0.7–1.3)
EOSINOPHIL NFR BLD: 0 % (ref 0–3)
EOSINOPHIL NFR BLD: 0 X10^3/UL (ref 0–0.7)
ERYTHROCYTE [DISTWIDTH] IN BLOOD BY AUTOMATED COUNT: 16.3 % (ref 11.5–14.5)
FIBRINOGEN PPP-MCNC: CLEAR MG/DL
GFR SERPLBLD BASED ON 1.73 SQ M-ARVRAT: 44.8 ML/MIN
GLOBULIN SER-MCNC: 4.1 G/DL (ref 2.2–3.8)
GLUCOSE SERPL-MCNC: 156 MG/DL (ref 70–99)
HCT VFR BLD CALC: 37.1 % (ref 39–53)
HGB BLD-MCNC: 12 G/DL (ref 13–17.5)
INFLUENZA A PATIENT: NEGATIVE
INFLUENZA B PATIENT: NEGATIVE
LYMPHOCYTES # BLD: 0.4 X10^3/UL (ref 1–4.8)
LYMPHOCYTES NFR BLD AUTO: 4 % (ref 24–48)
MAGNESIUM SERPL-MCNC: 1.7 MG/DL (ref 1.8–2.4)
MCH RBC QN AUTO: 27 PG (ref 25–35)
MCHC RBC AUTO-ENTMCNC: 32 G/DL (ref 31–37)
MCV RBC AUTO: 84 FL (ref 79–100)
MONO #: 0.8 X10^3/UL (ref 0–1.1)
MONOCYTES NFR BLD: 9 % (ref 0–9)
NEUT #: 8 X10^3/UL (ref 1.8–7.7)
NEUTROPHILS NFR BLD AUTO: 87 % (ref 31–73)
NITRITE UR QL STRIP: NEGATIVE
PH UR STRIP: 5.5 [PH]
PLATELET # BLD AUTO: 141 X10^3/UL (ref 140–400)
PLATELET # BLD EST: ADEQUATE 10*3/UL
POTASSIUM SERPL-SCNC: 4.3 MMOL/L (ref 3.5–5.1)
PROT SERPL-MCNC: 7.6 G/DL (ref 6.4–8.2)
PROT UR STRIP-MCNC: 30 MG/DL
PROTHROMBIN TIME: 19.5 SEC (ref 11.7–14)
RBC # BLD AUTO: 4.41 X10^6/UL (ref 4.3–5.7)
RBC #/AREA URNS HPF: (no result) /HPF (ref 0–2)
SODIUM SERPL-SCNC: 134 MMOL/L (ref 136–145)
SQUAMOUS #/AREA URNS LPF: (no result) /LPF
UROBILINOGEN UR-MCNC: 0.2 MG/DL
WBC # BLD AUTO: 9.2 X10^3/UL (ref 4–11)
WBC #/AREA URNS HPF: 0 /HPF (ref 0–4)

## 2019-11-25 PROCEDURE — 85025 COMPLETE CBC W/AUTO DIFF WBC: CPT

## 2019-11-25 PROCEDURE — 84165 PROTEIN E-PHORESIS SERUM: CPT

## 2019-11-25 PROCEDURE — 71250 CT THORAX DX C-: CPT

## 2019-11-25 PROCEDURE — 84484 ASSAY OF TROPONIN QUANT: CPT

## 2019-11-25 PROCEDURE — G0378 HOSPITAL OBSERVATION PER HR: HCPCS

## 2019-11-25 PROCEDURE — 85007 BL SMEAR W/DIFF WBC COUNT: CPT

## 2019-11-25 PROCEDURE — 82962 GLUCOSE BLOOD TEST: CPT

## 2019-11-25 PROCEDURE — 87040 BLOOD CULTURE FOR BACTERIA: CPT

## 2019-11-25 PROCEDURE — 71045 X-RAY EXAM CHEST 1 VIEW: CPT

## 2019-11-25 PROCEDURE — 83605 ASSAY OF LACTIC ACID: CPT

## 2019-11-25 PROCEDURE — 85730 THROMBOPLASTIN TIME PARTIAL: CPT

## 2019-11-25 PROCEDURE — 80048 BASIC METABOLIC PNL TOTAL CA: CPT

## 2019-11-25 PROCEDURE — 70450 CT HEAD/BRAIN W/O DYE: CPT

## 2019-11-25 PROCEDURE — 86038 ANTINUCLEAR ANTIBODIES: CPT

## 2019-11-25 PROCEDURE — 36415 COLL VENOUS BLD VENIPUNCTURE: CPT

## 2019-11-25 PROCEDURE — 83735 ASSAY OF MAGNESIUM: CPT

## 2019-11-25 PROCEDURE — 74176 CT ABD & PELVIS W/O CONTRAST: CPT

## 2019-11-25 PROCEDURE — 81001 URINALYSIS AUTO W/SCOPE: CPT

## 2019-11-25 PROCEDURE — 80053 COMPREHEN METABOLIC PANEL: CPT

## 2019-11-25 PROCEDURE — 85610 PROTHROMBIN TIME: CPT

## 2019-11-25 PROCEDURE — 83036 HEMOGLOBIN GLYCOSYLATED A1C: CPT

## 2019-11-25 PROCEDURE — 93005 ELECTROCARDIOGRAM TRACING: CPT

## 2019-11-25 PROCEDURE — 87804 INFLUENZA ASSAY W/OPTIC: CPT

## 2019-11-25 PROCEDURE — 87070 CULTURE OTHR SPECIMN AEROBIC: CPT

## 2019-11-25 PROCEDURE — 84145 PROCALCITONIN (PCT): CPT

## 2019-11-25 PROCEDURE — 87205 SMEAR GRAM STAIN: CPT

## 2019-11-25 PROCEDURE — 82607 VITAMIN B-12: CPT

## 2019-11-25 PROCEDURE — 85651 RBC SED RATE NONAUTOMATED: CPT

## 2019-11-25 RX ADMIN — OSELTAMIVIR PHOSPHATE SCH MG: 30 CAPSULE ORAL at 19:30

## 2019-11-25 RX ADMIN — TAMSULOSIN HYDROCHLORIDE SCH MG: 0.4 CAPSULE ORAL at 22:40

## 2019-11-25 NOTE — RAD
Exam: CT head

 

INDICATION: Altered mental status

 

TECHNIQUE: Sequential axial images through the head were obtained without 

the administration of IV contrast.

 

Comparisons: None

 

FINDINGS:

No focal parenchymal lesion or hemorrhage is identified. There is no 

midline shift or sulcal effacement.

 

Patchy hypodensity noted within the periventricular and subcortical white 

matter. No acute vascular territory infarction is identified. Gray-white 

distinction is preserved.

 

The ventricular system is within normal limits without compression 

hydrocephalus. The basal cisterns are well maintained.

 

The visualized portions of the paranasal sinuses and mastoid air cells are

well-pneumatized. No acute fractures.

 

IMPRESSION:

Patchy hypodensities representing small vessel ischemic change, 

technically age indeterminate without prior imaging. If there are concerns

for acute ischemia MRI is recommended.

 

Exposure: One or more of the following in the visualized dose reduction 

techniques were utilized for this examination:

1.  Automated exposure control

2.  Adjustment of the MA and/or KV according to patient size

Use of iterative of reconstructive technique

 

Electronically signed by: Siri Burgos MD (11/25/2019 8:35 PM) [facility]

## 2019-11-25 NOTE — PHYS DOC
Past Medical History


Past Medical History:  Diabetes-Type II, Heart Disease, Hypertension


Additional Past Medical Histor:  Eye Cancer, Prostate Cancer


 (DOLORES BELL)


Past Surgical History:  Angioplasty, Coronary Bypass Surgery, Pacemaker


Additional Past Surgical Histo:  Prostatectomy, Left Eye Surgery, Defib 

Placement, CARDIAC STENTS


 (DOLORES BELL)


Alcohol Use:  Occasionally


Drug Use:  None


 (DOLORES BELL)





Adult General


Chief Complaint


Chief Complaint:  WEAKNESS/GENERALIZED





HPI


HPI





Patient is a 84  year old male who presents with and was weakness, cough, fever.

The patient states that on Saturday he started having trouble walking and then 

started having a deep cough on Sunday with a fever he was unable to get out of 

his recliner today. He went to see his primary care doctor (Price) placed on 

doxycycline. He had one dose of this however he started having more and more 

weakness and also states states that he started feeling like he was getting 

confused and so decided to come to the ER. Denies pain at this time.


 (DOLORES BELL)





Review of Systems


Review of Systems





Constitutional: Reports fever or chills and generalized weakness.


Eyes: Denies change in visual acuity, redness, or eye pain []


HENT: Denies nasal congestion or sore throat []


Respiratory: Reports cough and shortness of breath.


Cardiovascular: No additional information not addressed in HPI []


GI: Denies abdominal pain, nausea, vomiting, bloody stools or diarrhea []


: Denies dysuria or hematuria []


Musculoskeletal: Denies back pain or joint pain []


Integument: Denies rash or skin lesions []


Neurologic: Denies headache, focal weakness or sensory changes []


Endocrine: Denies polyuria or polydipsia []





Complete systems were reviewed and found to be within normal limits, except as 

documented in this note.


 (DOLORES BELL)





Current Medications


Current Medications





Current Medications








 Medications


  (Trade)  Dose


 Ordered  Sig/Pedro Pablo  Start Time


 Stop Time Status Last Admin


Dose Admin


 


 Acetaminophen


  (Tylenol)  650 mg  PRN Q4HRS  PRN  11/25/19 19:15


 11/26/19 19:14   





 


 Ceftriaxone Sodium


  (Rocephin)  1 gm  1X  STAT  11/25/19 18:43


 11/25/19 18:45 DC 11/25/19 19:30


1 GM


 


 Fentanyl Citrate


  (Fentanyl 2ml


 Vial)  50 mcg  PRN Q1HR  PRN  11/25/19 19:15


 11/26/19 19:14   





 


 Magnesium Sulfate/


 Dextrose  100 ml @ 


 100 mls/hr  1X  STAT  11/25/19 19:07


 11/25/19 20:06 DC 11/25/19 20:28


100 MLS/HR


 


 Ondansetron HCl


  (Zofran)  4 mg  PRN Q8HRS  PRN  11/25/19 19:15


 11/26/19 19:14   





 


 Sodium Chloride  1,000 ml @ 


 1,000 mls/hr  1X  STAT  11/25/19 19:05


 11/25/19 20:04 DC 11/25/19 19:31


1,000 MLS/HR





 (HERMILA MATHUR MD)





Physical Exam


Physical Exam





Constitutional: Well developed, well nourished, no acute distress, non-toxic 

appearance. []


HENT: Normocephalic, atraumatic, bilateral external ears normal, oropharynx 

moist, no oral exudates, nose normal. []


Eyes: PERRLA, EOMI, conjunctiva normal, no discharge. [] 


Neck: Normal range of motion, no tenderness, supple, no stridor. [] 


Cardiovascular:Heart rate regular rhythm, no murmur []


Lungs & Thorax:  Bilateral breath sounds clear to auscultation []


Abdomen: Bowel sounds normal, soft, no tenderness, no masses, no pulsatile 

masses. [] 


Skin: Warm, dry, no erythema, no rash. [] 


Back: No tenderness, no CVA tenderness. [] 


Extremities: No tenderness, no cyanosis, no clubbing, ROM intact, no edema. [] 


Neurologic: Alert and oriented X 3, normal motor function, normal sensory 

function, no focal deficits noted. []


Psychologic: Affect normal, judgement normal, mood normal. []


 (DOLORES BELL)





Current Patient Data


Vital Signs





                                   Vital Signs








  Date Time  Temp Pulse Resp B/P (MAP) Pulse Ox O2 Delivery O2 Flow Rate FiO2


 


11/25/19 18:30  98 18  94   


 


11/25/19 17:02 103.6   160/90 (113)  Room Air  





 103.6       





 (HERMILA MATHUR MD)


Lab Values





                                Laboratory Tests








Test


 11/25/19


17:12 11/25/19


17:17 11/25/19


18:08


 


Influenza Type A Antigen


 Negative


(NEGATIVE) 


 





 


Influenza Type B Antigen


 Negative


(NEGATIVE) 


 





 


White Blood Count


 


 9.2 x10^3/uL


(4.0-11.0) 





 


Red Blood Count


 


 4.41 x10^6/uL


(4.30-5.70) 





 


Hemoglobin


 


 12.0 g/dL


(13.0-17.5)  L 





 


Hematocrit


 


 37.1 %


(39.0-53.0)  L 





 


Mean Corpuscular Volume


 


 84 fL ()


 





 


Mean Corpuscular Hemoglobin  27 pg (25-35)   


 


Mean Corpuscular Hemoglobin


Concent 


 32 g/dL


(31-37) 





 


Red Cell Distribution Width


 


 16.3 %


(11.5-14.5)  H 





 


Platelet Count


 


 141 x10^3/uL


(140-400) 





 


Neutrophils (%) (Auto)  87 % (31-73)  H 


 


Lymphocytes (%) (Auto)  4 % (24-48)  L 


 


Monocytes (%) (Auto)  9 % (0-9)   


 


Eosinophils (%) (Auto)  0 % (0-3)   


 


Basophils (%) (Auto)  0 % (0-3)   


 


Neutrophils # (Auto)


 


 8.0 x10^3/uL


(1.8-7.7)  H 





 


Lymphocytes # (Auto)


 


 0.4 x10^3/uL


(1.0-4.8)  L 





 


Monocytes # (Auto)


 


 0.8 x10^3/uL


(0.0-1.1) 





 


Eosinophils # (Auto)


 


 0.0 x10^3/uL


(0.0-0.7) 





 


Basophils # (Auto)


 


 0.0 x10^3/uL


(0.0-0.2) 





 


Segmented Neutrophils %  66 % (35-66)   


 


Band Neutrophils %  19 % (0-9)  H 


 


Lymphocytes %  6 % (24-48)  L 


 


Atypical Lymphocytes %


(Manual) 


 2 % (0-0)  H


 





 


Monocytes %  7 % (0-10)   


 


Platelet Estimate


 


 Adequate


(ADEQUATE) 





 


Anisocytosis  Slight   


 


Prothrombin Time


 


 19.5 SEC


(11.7-14.0)  H 





 


Prothrombin Time INR


 


 1.7 (0.8-1.1)


H 





 


Activated Partial


Thromboplast Time 


 39 SEC (24-38)


H 





 


Sodium Level


 


 134 mmol/L


(136-145)  L 





 


Potassium Level


 


 4.3 mmol/L


(3.5-5.1) 





 


Chloride Level


 


 97 mmol/L


()  L 





 


Carbon Dioxide Level


 


 25 mmol/L


(21-32) 





 


Anion Gap  12 (6-14)   


 


Blood Urea Nitrogen


 


 24 mg/dL


(8-26) 





 


Creatinine


 


 1.5 mg/dL


(0.7-1.3)  H 





 


Estimated GFR


(Cockcroft-Gault) 


 44.8  


 





 


BUN/Creatinine Ratio  16 (6-20)   


 


Glucose Level


 


 156 mg/dL


(70-99)  H 





 


Lactic Acid Level


 


 2.7 mmol/L


(0.4-2.0)  H 





 


Calcium Level


 


 9.0 mg/dL


(8.5-10.1) 





 


Magnesium Level


 


 1.7 mg/dL


(1.8-2.4)  L 





 


Total Bilirubin


 


 0.7 mg/dL


(0.2-1.0) 





 


Aspartate Amino Transferase


(AST) 


 40 U/L (15-37)


H 





 


Alanine Aminotransferase (ALT)


 


 35 U/L (16-63)


 





 


Alkaline Phosphatase


 


 65 U/L


() 





 


Troponin I Quantitative


 


 < 0.017 ng/mL


(0.000-0.055) 





 


Total Protein


 


 7.6 g/dL


(6.4-8.2) 





 


Albumin


 


 3.5 g/dL


(3.4-5.0) 





 


Albumin/Globulin Ratio


 


 0.9 (1.0-1.7)


L 





 


Procalcitonin


 


 < 0.10 ng/mL


(0.00-0.10) 





 


Urine Collection Type   Unknown  


 


Urine Color   Yellow  


 


Urine Clarity   Clear  


 


Urine pH   5.5  


 


Urine Specific Gravity   1.020  


 


Urine Protein


 


 


 30 mg/dL


(NEG-TRACE)


 


Urine Glucose (UA)


 


 


 Negative mg/dL


(NEG)


 


Urine Ketones (Stick)


 


 


 Negative mg/dL


(NEG)


 


Urine Blood   Small (NEG)  


 


Urine Nitrite


 


 


 Negative (NEG)





 


Urine Bilirubin


 


 


 Negative (NEG)





 


Urine Urobilinogen Dipstick


 


 


 0.2 mg/dL (0.2


mg/dL)


 


Urine Leukocyte Esterase


 


 


 Negative (NEG)





 


Urine RBC


 


 


 1-2 /HPF (0-2)





 


Urine WBC   0 /HPF (0-4)  


 


Urine Squamous Epithelial


Cells 


 


 Few /LPF  





 


Urine Bacteria


 


 


 0 /HPF (0-FEW)





 


Urine Mucus   Mod /LPF  





                                Laboratory Tests


11/25/19 17:17








                                Laboratory Tests


11/25/19 17:17








 (HERMILA MATHUR MD)


Lab Values





                                Laboratory Tests








Test


 11/25/19


17:12 11/25/19


17:17 11/25/19


18:08


 


Influenza Type A Antigen


 Negative


(NEGATIVE) 


 





 


Influenza Type B Antigen


 Negative


(NEGATIVE) 


 





 


White Blood Count


 


 9.2 x10^3/uL


(4.0-11.0) 





 


Red Blood Count


 


 4.41 x10^6/uL


(4.30-5.70) 





 


Hemoglobin


 


 12.0 g/dL


(13.0-17.5)  L 





 


Hematocrit


 


 37.1 %


(39.0-53.0)  L 





 


Mean Corpuscular Volume


 


 84 fL ()


 





 


Mean Corpuscular Hemoglobin  27 pg (25-35)   


 


Mean Corpuscular Hemoglobin


Concent 


 32 g/dL


(31-37) 





 


Red Cell Distribution Width


 


 16.3 %


(11.5-14.5)  H 





 


Platelet Count


 


 141 x10^3/uL


(140-400) 





 


Neutrophils (%) (Auto)  87 % (31-73)  H 


 


Lymphocytes (%) (Auto)  4 % (24-48)  L 


 


Monocytes (%) (Auto)  9 % (0-9)   


 


Eosinophils (%) (Auto)  0 % (0-3)   


 


Basophils (%) (Auto)  0 % (0-3)   


 


Neutrophils # (Auto)


 


 8.0 x10^3/uL


(1.8-7.7)  H 





 


Lymphocytes # (Auto)


 


 0.4 x10^3/uL


(1.0-4.8)  L 





 


Monocytes # (Auto)


 


 0.8 x10^3/uL


(0.0-1.1) 





 


Eosinophils # (Auto)


 


 0.0 x10^3/uL


(0.0-0.7) 





 


Basophils # (Auto)


 


 0.0 x10^3/uL


(0.0-0.2) 





 


Platelet Estimate  Pending   


 


Prothrombin Time


 


 19.5 SEC


(11.7-14.0)  H 





 


Prothrombin Time INR


 


 1.7 (0.8-1.1)


H 





 


Activated Partial


Thromboplast Time 


 39 SEC (24-38)


H 





 


Sodium Level


 


 134 mmol/L


(136-145)  L 





 


Potassium Level


 


 4.3 mmol/L


(3.5-5.1) 





 


Chloride Level


 


 97 mmol/L


()  L 





 


Carbon Dioxide Level


 


 25 mmol/L


(21-32) 





 


Anion Gap  12 (6-14)   


 


Blood Urea Nitrogen


 


 24 mg/dL


(8-26) 





 


Creatinine


 


 1.5 mg/dL


(0.7-1.3)  H 





 


Estimated GFR


(Cockcroft-Gault) 


 44.8  


 





 


BUN/Creatinine Ratio  16 (6-20)   


 


Glucose Level


 


 156 mg/dL


(70-99)  H 





 


Lactic Acid Level


 


 2.7 mmol/L


(0.4-2.0)  H 





 


Calcium Level


 


 9.0 mg/dL


(8.5-10.1) 





 


Magnesium Level


 


 1.7 mg/dL


(1.8-2.4)  L 





 


Total Bilirubin


 


 0.7 mg/dL


(0.2-1.0) 





 


Aspartate Amino Transferase


(AST) 


 40 U/L (15-37)


H 





 


Alanine Aminotransferase (ALT)


 


 35 U/L (16-63)


 





 


Alkaline Phosphatase


 


 65 U/L


() 





 


Total Protein


 


 7.6 g/dL


(6.4-8.2) 





 


Albumin


 


 3.5 g/dL


(3.4-5.0) 





 


Albumin/Globulin Ratio


 


 0.9 (1.0-1.7)


L 





 


Procalcitonin


 


 < 0.10 ng/mL


(0.00-0.10) 





 


Urine Collection Type   Unknown  


 


Urine Color   Yellow  


 


Urine Clarity   Clear  


 


Urine pH   5.5  


 


Urine Specific Gravity   1.020  


 


Urine Protein


 


 


 30 mg/dL


(NEG-TRACE)


 


Urine Glucose (UA)


 


 


 Negative mg/dL


(NEG)


 


Urine Ketones (Stick)


 


 


 Negative mg/dL


(NEG)


 


Urine Blood   Small (NEG)  


 


Urine Nitrite


 


 


 Negative (NEG)





 


Urine Bilirubin


 


 


 Negative (NEG)





 


Urine Urobilinogen Dipstick


 


 


 0.2 mg/dL (0.2


mg/dL)


 


Urine Leukocyte Esterase


 


 


 Negative (NEG)





 


Urine RBC


 


 


 1-2 /HPF (0-2)





 


Urine WBC   0 /HPF (0-4)  


 


Urine Squamous Epithelial


Cells 


 


 Few /LPF  





 


Urine Bacteria


 


 


 0 /HPF (0-FEW)





 


Urine Mucus   Mod /LPF  





                                Laboratory Tests


11/25/19 17:17








                                Laboratory Tests


11/25/19 17:17








 (DOLORES BELL)





EKG


EKG


EKG interpreted by Dr. Kevan Allen with rate of 91. No STEMI.[]


 (DOLORES BELL)





Radiology/Procedures


Radiology/Procedures


[]


 (DOLORES BELL)





Course & Med Decision Making


Course & Med Decision Making


Pertinent Labs and Imaging studies reviewed. (See chart for details)





Patient is tachycardic, with weakness, and fever. Appears to be septic.





Lactic is 2.7, procalcitonin is negative. Creatinine is 1.5, mag is 1.7 (Will 

replace)





Appears to be septic and has TALITA. Will give 2 L of NS to meet 30 ml/kg sepsis 

guidelines. 





Discussed with Dr. Randhawa who accepts admission to hospital. Dr. Randhawa wonders 

if the Flu test was a false negative and if he has flu. Will order Tamiflu BID 

30 mg. Will also add on EKG and Troponin. Also discussed with Dr. Mathur who 

suggest to add on CT CHEST/ABDOMEN/PELVIS to check for other infection sources.





 (DOLORES BELL)


Course & Med Decision Making


I saw and evaluated this patient


CT chest abdomen pelvis that I reviewed after the patient left the department 

did show a left base pneumonia. Patient did not need a lumbar puncture in my 

opinion. I ordered a dose of doxycycline to add ceftriaxone


 (HERMILA MATHUR MD)


Dragon Disclaimer


Dragon Disclaimer


This electronic medical record was generated, in whole or in part, using a voice

 recognition dictation system.


 (DOLORES BELL)





Departure


Departure


Impression:  


   Primary Impression:  


   Sepsis


   Additional Impressions:  


   TALITA (acute kidney injury)


   Hypomagnesemia


Admitting Physician:  MANDO


 (DOLORES BELL)


Condition:  STABLE


Referrals:  


DOLORES CALLEJAS MD (PCP)





Date and Time of Reassessment


Date:  Nov 25, 2019


Time:  18:29


 (DOLORES BELL)





Fluid Challenge


Is the fluid challenge complet:  No


IBW Target Volume Used:  No


BMI > 30:  No


 (DOLORES BELL)





Vital Signs


Vital Signs:





Vital Signs








  Date Time  Temp Pulse Resp B/P (MAP) Pulse Ox O2 Delivery O2 Flow Rate FiO2


 


11/25/19 18:30  98 18  94   


 


11/25/19 17:02 103.6   160/90 (113)  Room Air  





 103.6       





 (HERMILA MATHUR MD)


Temperature Source:  Rectal


 (DOLORES BELL)





Respirations


Respiratory Effort:  Normal


Respiratory Pattern:  Normal


 (DOLORES BELL)





Cardiovascular


Pulse Rhythm:  Regular


Heart:  No rubs, clicks or gallop, S1 and S2 normal


 (DOLORES BELL)





Lung Sounds


Breath Sounds:  Clear


 (DOLORES BELL)





Capillary Refil


Capillary Refill:  Rt Hand < 3 seconds


 (DOLORES BELL)





Peripheral Pulse


Pulse Location:  Radial


Pulse Strength:  Normal (2+)


Pulse Assessment Method:  Monitor


 (DOLORES BELL)





Integumentary


Skin:  Warm, Dry


Skin Moisture:  Dry


Skin Turgor:  Normal


Skin Color:  warm, dry


Fingernail Color:  WNL


 (DOLORES BELL)





Problem Qualifiers








   Primary Impression:  


   Sepsis


   Sepsis type:  sepsis due to unspecified organism  Sepsis acute organ 

   dysfunction status:  with acute organ dysfunction  Severe sepsis acute organ 

   dysfunction type:  acute renal failure  Severe sepsis shock status:  without 

   septic shock








DOLORES BELL          Nov 25, 2019 17:26


HERMILA MATHUR MD            Nov 25, 2019 23:12

## 2019-11-25 NOTE — RAD
Exam: Chest one view

 

INDICATION: Cough

 

TECHNIQUE: Frontal view of the chest

 

Comparisons: 11/3/2018

 

FINDINGS:

AICD with leads terminating the right atrium, ventricle and coronary 

sinus. Sternotomy wires are again noted.

 

Heart is enlarged. Pulmonary vessels are within normal limits.

 

The lung and pleural spaces are clear.

 

IMPRESSION:

No acute pulmonary process.

 

Electronically signed by: Siri Burgos MD (11/25/2019 5:56 PM) [id number]

## 2019-11-25 NOTE — RAD
Exam: CT of chest, abdomen and pelvis without contrast

 

INDICATION: Fever

 

TECHNIQUE: Sequential axial images through the chest, abdomen and pelvis 

obtained without IV contrast. Sagittal and coronal reformatted images were

reconstructed from the axial data and reviewed.

 

Comparisons: None

 

FINDINGS:

Visualized portions of the thyroid are unremarkable. No enlarged 

mediastinal lymph nodes.

 

Heart is enlarged. No pericardial effusion. Severe trivessel coronary 

artery calcifications are noted. AICD leads noted terminating in the right

atrium, ventricle and coronary sinus.

 

Thoracic aorta has a normal course and caliber. Pulmonary artery is not 

enlarged.

 

Airways are patent. There is patchy airspace disease at the left lung 

base. More focal areas of dense consolidation noted centrally. No 

pneumothorax. No suspicious lung.

 

No pleural effusion or thickening.

 

Evaluation of solid organs is limited secondary to noncontrast technique.

 

Liver, spleen, pancreas, and adrenals are unremarkable. Gallstones are 

noted within the gallbladder which is otherwise unremarkable.

 

No perinephric inflammation or hydronephrosis. There is a hyperattenuating

cystic lesion at the mid right kidney measuring 2.5 cm in diameter which 

is incompletely characterized on noncontrast exam. No renal or ureteral 

calculi are identified.

 

Bladder is distended and appears thin-walled. Prostate is not enlarged.

 

Diverticulosis in the colon predominantly at the sigmoid colon without 

evidence of acute diverticulitis. Remainder of the large and small bowel 

are unremarkable. No obstruction. No free intra-abdominal air or fluid.

 

There is ectasia of the infrarenal abdominal aorta measuring up to 2.8 cm 

in diameter.

 

No enlarged intra-abdominal lymph nodes are identified.

 

No suspicious osseous lesions or acute fractures.

 

IMPRESSION:

1.  Airspace disease at the left lung base favored to be infectious or 

inflammatory in etiology.

2.  No source of infection identified within the abdomen or pelvis.

3.  Diverticulosis without evidence of acute diverticulitis.

4.  Cholelithiasis

5.  Ectasia of the infrarenal abdominal aorta measuring up to 2.8 cm in 

diameter.

 

 

Exposure: One or more of the following in the visualized dose reduction 

techniques were utilized for this examination:

1.  Automated exposure control

2.  Adjustment of the MA and/or KV according to patient size

3.  Use of iterative of reconstructive technique

 

Electronically signed by: Siri Burgos MD (11/25/2019 8:40 PM) [id number]

## 2019-11-26 VITALS — DIASTOLIC BLOOD PRESSURE: 56 MMHG | SYSTOLIC BLOOD PRESSURE: 140 MMHG

## 2019-11-26 VITALS — DIASTOLIC BLOOD PRESSURE: 48 MMHG | SYSTOLIC BLOOD PRESSURE: 111 MMHG

## 2019-11-26 VITALS — DIASTOLIC BLOOD PRESSURE: 64 MMHG | SYSTOLIC BLOOD PRESSURE: 127 MMHG

## 2019-11-26 VITALS — SYSTOLIC BLOOD PRESSURE: 105 MMHG | DIASTOLIC BLOOD PRESSURE: 49 MMHG

## 2019-11-26 VITALS — DIASTOLIC BLOOD PRESSURE: 54 MMHG | SYSTOLIC BLOOD PRESSURE: 102 MMHG

## 2019-11-26 VITALS — SYSTOLIC BLOOD PRESSURE: 123 MMHG | DIASTOLIC BLOOD PRESSURE: 62 MMHG

## 2019-11-26 LAB
ANION GAP SERPL CALC-SCNC: 10 MMOL/L (ref 6–14)
BUN SERPL-MCNC: 17 MG/DL (ref 8–26)
CALCIUM SERPL-MCNC: 8.5 MG/DL (ref 8.5–10.1)
CHLORIDE SERPL-SCNC: 103 MMOL/L (ref 98–107)
CO2 SERPL-SCNC: 24 MMOL/L (ref 21–32)
CREAT SERPL-MCNC: 1.2 MG/DL (ref 0.7–1.3)
GFR SERPLBLD BASED ON 1.73 SQ M-ARVRAT: 58 ML/MIN
GLUCOSE SERPL-MCNC: 123 MG/DL (ref 70–99)
POTASSIUM SERPL-SCNC: 3.6 MMOL/L (ref 3.5–5.1)
SODIUM SERPL-SCNC: 137 MMOL/L (ref 136–145)

## 2019-11-26 RX ADMIN — APIXABAN SCH MG: 5 TABLET, FILM COATED ORAL at 21:08

## 2019-11-26 RX ADMIN — POLYETHYLENE GLYCOL 3350 SCH GM: 17 POWDER, FOR SOLUTION ORAL at 12:47

## 2019-11-26 RX ADMIN — AMIODARONE HYDROCHLORIDE SCH MG: 200 TABLET ORAL at 21:08

## 2019-11-26 RX ADMIN — OSELTAMIVIR PHOSPHATE SCH MG: 30 CAPSULE ORAL at 08:20

## 2019-11-26 RX ADMIN — DOXYCYCLINE HYCLATE SCH MG: 100 TABLET, COATED ORAL at 15:14

## 2019-11-26 RX ADMIN — CLOPIDOGREL BISULFATE SCH MG: 75 TABLET ORAL at 12:47

## 2019-11-26 RX ADMIN — DOXYCYCLINE HYCLATE SCH MG: 100 TABLET, COATED ORAL at 15:18

## 2019-11-26 RX ADMIN — MAGNESIUM OXIDE TAB 400 MG (241.3 MG ELEMENTAL MG) SCH MG: 400 (241.3 MG) TAB at 08:20

## 2019-11-26 RX ADMIN — FAMOTIDINE SCH MG: 20 TABLET ORAL at 12:48

## 2019-11-26 RX ADMIN — TAMSULOSIN HYDROCHLORIDE SCH MG: 0.4 CAPSULE ORAL at 21:08

## 2019-11-26 RX ADMIN — METOPROLOL SUCCINATE SCH MG: 25 TABLET, EXTENDED RELEASE ORAL at 12:49

## 2019-11-26 RX ADMIN — FAMOTIDINE SCH MG: 20 TABLET ORAL at 21:09

## 2019-11-26 RX ADMIN — MAGNESIUM OXIDE TAB 400 MG (241.3 MG ELEMENTAL MG) SCH MG: 400 (241.3 MG) TAB at 21:08

## 2019-11-26 RX ADMIN — DOCUSATE SODIUM SCH MG: 100 CAPSULE, LIQUID FILLED ORAL at 21:08

## 2019-11-26 RX ADMIN — POLYETHYLENE GLYCOL 3350 SCH GM: 17 POWDER, FOR SOLUTION ORAL at 12:00

## 2019-11-26 RX ADMIN — OSELTAMIVIR PHOSPHATE SCH MG: 75 CAPSULE ORAL at 21:08

## 2019-11-26 RX ADMIN — APIXABAN SCH MG: 5 TABLET, FILM COATED ORAL at 12:48

## 2019-11-26 RX ADMIN — AMIODARONE HYDROCHLORIDE SCH MG: 200 TABLET ORAL at 12:48

## 2019-11-26 RX ADMIN — DOCUSATE SODIUM SCH MG: 100 CAPSULE, LIQUID FILLED ORAL at 12:49

## 2019-11-26 NOTE — PDOC2
NEUROLOGY CONSULT


Date of Admission


Date of Admission


DATE: 11/26/19 


TIME: 14:09





Reason for Consult


Reason for Consult:


weakness





Referring Physician


Referring Physician:


Dr. Martinez


PCP:  Dr. Thrasher





Source


Source:  Caregiver (wife), Chart review, Patient





History of Present Illness


History of Present Illness


The patient is an 84-year-old right-handed male who started feeling weak as long

ago as last week. He and his wife like to walk around [facility]. He has used a 

push cart to keep himself steady for a long time. He could barely take steps. 

Yesterday he went to his primary care physician with a fever of 103. Temperature

was normal at the office, but the patient was still sent 10. He was found to 

have evidence of pneumonia, sepsis, and lactic acidosis. There is no history of 

stroke, seizure, head injury, spine injury, neck pain, or back pain





Past Medical History


Cardiovascular:  AFIB, CAD, HTN, MI


GI:  GERD


Musculoskeletal:  Osteoarthritis


ENT:  Other (bilateral hearing aids)


Renal/:  Prostate Ca.


Endocrine:  Diabetes





Past Surgical History


Past Surgical History:  Pacemaker (/defibrillator), Cataract Removal, Other 

(coronary stent, right leg fracture)





Family History


Family History:  CAD





Social History


Social History


, ex-smoker, no alcohol, retired





Current Medications


Current Medications





Current Medications


Sodium Chloride 1,000 ml @  1,000 mls/hr 1X  STAT IV  Last administered on 

11/25/19at 17:46;  Start 11/25/19 at 17:22;  Stop 11/25/19 at 18:21;  Status DC


Ceftriaxone Sodium (Rocephin) 1 gm 1X  STAT IVP  Last administered on 11/25/19at

19:30;  Start 11/25/19 at 18:43;  Stop 11/25/19 at 18:45;  Status DC


Sodium Chloride 1,000 ml @  1,000 mls/hr 1X  STAT IV  Last administered on 

11/25/19at 19:31;  Start 11/25/19 at 19:05;  Stop 11/25/19 at 20:04;  Status DC


Oseltamivir Phosphate (Tamiflu) 30 mg BID PO  Last administered on 11/26/19at 

08:20;  Start 11/25/19 at 20:00;  Stop 11/26/19 at 12:56;  Status DC


Magnesium Sulfate/ Dextrose 100 ml @  100 mls/hr 1X  STAT IV  Last administered 

on 11/25/19at 20:28;  Start 11/25/19 at 19:07;  Stop 11/25/19 at 20:06;  Status 

DC


Ondansetron HCl (Zofran) 4 mg PRN Q8HRS  PRN IV NAUSEA/VOMITING;  Start 11/25/19

at 19:15;  Stop 11/26/19 at 19:14


Fentanyl Citrate (Fentanyl 2ml Vial) 50 mcg PRN Q1HR  PRN IV PAIN;  Start 

11/25/19 at 19:15;  Stop 11/26/19 at 19:14


Acetaminophen (Tylenol) 650 mg PRN Q4HRS  PRN PO FEVER Last administered on 

11/26/19at 00:42;  Start 11/25/19 at 19:15;  Stop 11/26/19 at 19:14


Aspirin (Ecotrin) 325 mg DAILY16 PO ;  Start 11/26/19 at 16:00


Atorvastatin Calcium (Lipitor) 20 mg DAILY16 PO ;  Start 11/26/19 at 16:00


Magnesium Oxide (Magnesium Oxide) 400 mg BID PO  Last administered on 11/26/19at

08:20;  Start 11/26/19 at 09:00


Tamsulosin HCl (Flomax) 0.4 mg QHS PO  Last administered on 11/25/19at 22:40;  

Start 11/25/19 at 22:00


Doxycycline Hyclate 100 mg/ Dextrose 100 ml @  50 mls/hr 1X  ONCE IV  Last 

administered on 11/25/19at 23:35;  Start 11/25/19 at 23:45;  Stop 11/26/19 at 

01:44;  Status DC


Amiodarone HCl (Cordarone) 400 mg BID PO  Last administered on 11/26/19at 12:48;

 Start 11/26/19 at 12:00


Apixaban (Eliquis) 5 mg BID PO  Last administered on 11/26/19at 12:48;  Start 

11/26/19 at 12:00


Atorvastatin Calcium (Lipitor) 40 mg QHS PO ;  Start 11/26/19 at 21:00


Clopidogrel Bisulfate (Plavix) 75 mg DAILY PO  Last administered on 11/26/19at 

12:47;  Start 11/26/19 at 12:00


Docusate Sodium (Colace) 100 mg BID PO  Last administered on 11/26/19at 12:49;  

Start 11/26/19 at 12:00


Ferrous Sulfate (Feosol) 325 mg DAILY PO ;  Start 11/27/19 at 09:00


Metoprolol Succinate (Toprol Xl) 25 mg DAILY PO  Last administered on 11/26/19at

12:49;  Start 11/26/19 at 12:00


Polyethylene Glycol (miraLAX PACKET) 17 gm DAILY PO ;  Start 11/26/19 at 12:00


Fluticasone Propionate (Flonase) 2 spray DAILY NS ;  Start 11/27/19 at 09:00


Multivitamins (Thera M Plus) 1 tab DAILY PO ;  Start 11/27/19 at 09:00


Non-Formulary Medication (Loratadine (Claritin)) 1 cap DAILY PO ;  Start 

11/27/19 at 09:00;  Status UNV


Non-Formulary Medication (Multivits-Min/ Fa/Lycopene/Lut (Centrum Silver 

Tablet)) 1 tab DAILY .ROUTE ;  Start 11/27/19 at 09:00;  Status UNV


Famotidine (Pepcid) 20 mg BID PO  Last administered on 11/26/19at 12:48;  Start 

11/26/19 at 12:00


Non-Formulary Medication ([melatonin] ) 10 mg QHS PO ;  Start 11/26/19 at 21:00;

  Status UNV


Ceftriaxone Sodium (Rocephin) 1 gm Q24H IVP ;  Start 11/26/19 at 18:00


Doxycycline Hyclate (Vibra-Tab) 100 mg BID PO ;  Start 11/26/19 at 13:30


Oseltamivir Phosphate (Tamiflu) 75 mg BID PO ;  Start 11/26/19 at 14:00;  Stop 

11/30/19 at 13:59





Active Scripts


Active


Amiodarone Hcl 200 Mg Tablet 400 Mg PO BID 30 Days


Flomax (Tamsulosin Hcl) 0.4 Mg Cap.er.24h 0.4 Mg PO QHS 30 Days


Mag-Oxide (Magnesium Oxide) 400 Mg Tablet 400 Mg PO BID 30 Days


Reported


Eliquis (Apixaban) 5 Mg Tablet 5 Mg PO BID


Claritin (Loratadine) 10 Mg Capsule 1 Cap PO DAILY 30 Days


Miralax (Polyethylene Glycol 3350) 17 Gm Powd.pack 1 Packet PO DAILY 2 Days


     dissolve in water


Colace (Docusate Sodium) 100 Mg Capsule 1 Cap PO BID 30 Days


Flonase Allergy Relief (Fluticasone Propionate) 9.9 Ml Spray.susp 2 Sprays NS 

DAILY


Clopidogrel (Clopidogrel Bisulfate) 75 Mg Tablet 1 Tab PO DAILY


Ranitidine Hcl 150 Mg Capsule 1 Cap PO BID


Atorvastatin Calcium 40 Mg Tablet 1 Tab PO DAILY


Ferrous Sulfate 325 Mg Tablet 1 Tab PO DAILY


Centrum Silver Chewable Tablet (Folic Acid/Multivits-Min/Lut) 1 Each Tab.chew 1 

Each PO ONCE


Metoprolol Succinate ( Xl ) (Metoprolol Succinate) 25 Mg Tab.er.24h 1 Tab PO 

DAILY


[melatonin]   10 Mg PO QHS


Centrum Silver Tablet (Multivits-Min/Fa/Lycopene/Lut) 1 Each Tablet 1 Tab  DAILY


Metformin Hcl 500 Mg Tablet 1 Tab  BIDBFRMEAL





Allergies


Allergies:  


Coded Allergies:  


     Milk Containing Products (Verified  Allergy, Intermediate, 11/25/19)


     Sulfa (Sulfonamide Antibiotics) (Verified  Allergy, Mild, 11/26/19)





ROS


Review of System


Negative for fever, chills, weight loss, shortness of breath, chest pain, 

indigestion, hematochezia, melena, and dysuria.  Full 14-point review of systems

 is negative.





Physical Exam


Physical Examination


General:


Well-developed, well-nourished white male in no acute distress


HEENT:


Normocephalic andatraumatic. Temporal arteriespulsatile and nontender.


Neck:


Supple without bruit, no meningismus


Musculoskeletal:


Stability:see neurologic. Gait exam:see neurologic. Tone:see 

neurologic.Strength:see neurologic.


Neurological:


Mental Status:intact, orientation, memory, attention span/concentration, 

language, fund of knowledge normal. Cranial Nerves:Pupils equal and reactive to

 light, extraocular movements areintact, visual fields are full to 

confrontation. Facial sensation is normal. There is no facial asymmetry. 

Vestibulo-ocular reflex is intact. Palate elevates and tongue protrudes in 

midline. All other cranial related problems are negative except as mentioned 

before.Reflexes:1+ and symmetric with flexor plantar responses. Motor:5/5 

strength with normal tone and bulk. Coordination:Finger-nose finger and 

heel-to-shin testing are normal. Rapid alternating movements and fine finger 

movements are intact. Gait:Does well with walker. Sensory:Stocking loss





Vitals


VITALS





Vital Signs








  Date Time  Temp Pulse Resp B/P (MAP) Pulse Ox O2 Delivery O2 Flow Rate FiO2


 


11/26/19 12:49  93  140/56    


 


11/26/19 11:00 97.7  18  94 Room Air  





 97.7       











Labs


Labs





Laboratory Tests








Test


 11/25/19


17:12 11/25/19


17:17 11/25/19


18:08 11/25/19


20:28


 


Influenza Type A Antigen


 Negative


(NEGATIVE) 


 


 





 


Influenza Type B Antigen


 Negative


(NEGATIVE) 


 


 





 


White Blood Count


 


 9.2 x10^3/uL


(4.0-11.0) 


 





 


Red Blood Count


 


 4.41 x10^6/uL


(4.30-5.70) 


 





 


Hemoglobin


 


 12.0 g/dL


(13.0-17.5) 


 





 


Hematocrit


 


 37.1 %


(39.0-53.0) 


 





 


Mean Corpuscular Volume  84 fL ()   


 


Mean Corpuscular Hemoglobin  27 pg (25-35)   


 


Mean Corpuscular Hemoglobin


Concent 


 32 g/dL


(31-37) 


 





 


Red Cell Distribution Width


 


 16.3 %


(11.5-14.5) 


 





 


Platelet Count


 


 141 x10^3/uL


(140-400) 


 





 


Neutrophils (%) (Auto)  87 % (31-73)   


 


Lymphocytes (%) (Auto)  4 % (24-48)   


 


Monocytes (%) (Auto)  9 % (0-9)   


 


Eosinophils (%) (Auto)  0 % (0-3)   


 


Basophils (%) (Auto)  0 % (0-3)   


 


Neutrophils # (Auto)


 


 8.0 x10^3/uL


(1.8-7.7) 


 





 


Lymphocytes # (Auto)


 


 0.4 x10^3/uL


(1.0-4.8) 


 





 


Monocytes # (Auto)


 


 0.8 x10^3/uL


(0.0-1.1) 


 





 


Eosinophils # (Auto)


 


 0.0 x10^3/uL


(0.0-0.7) 


 





 


Basophils # (Auto)


 


 0.0 x10^3/uL


(0.0-0.2) 


 





 


Segmented Neutrophils %  66 % (35-66)   


 


Band Neutrophils %  19 % (0-9)   


 


Lymphocytes %  6 % (24-48)   


 


Atypical Lymphocytes %


(Manual) 


 2 % (0-0) 


 


 





 


Monocytes %  7 % (0-10)   


 


Platelet Estimate


 


 Adequate


(ADEQUATE) 


 





 


Anisocytosis  Slight   


 


Prothrombin Time


 


 19.5 SEC


(11.7-14.0) 


 





 


Prothromb Time International


Ratio 


 1.7 (0.8-1.1) 


 


 





 


Activated Partial


Thromboplast Time 


 39 SEC (24-38) 


 


 





 


Sodium Level


 


 134 mmol/L


(136-145) 


 





 


Potassium Level


 


 4.3 mmol/L


(3.5-5.1) 


 





 


Chloride Level


 


 97 mmol/L


() 


 





 


Carbon Dioxide Level


 


 25 mmol/L


(21-32) 


 





 


Anion Gap  12 (6-14)   


 


Blood Urea Nitrogen


 


 24 mg/dL


(8-26) 


 





 


Creatinine


 


 1.5 mg/dL


(0.7-1.3) 


 





 


Estimated GFR


(Cockcroft-Gault) 


 44.8 


 


 





 


BUN/Creatinine Ratio  16 (6-20)   


 


Glucose Level


 


 156 mg/dL


(70-99) 


 





 


Lactic Acid Level


 


 2.7 mmol/L


(0.4-2.0) 


 1.2 mmol/L


(0.4-2.0)


 


Calcium Level


 


 9.0 mg/dL


(8.5-10.1) 


 





 


Magnesium Level


 


 1.7 mg/dL


(1.8-2.4) 


 





 


Total Bilirubin


 


 0.7 mg/dL


(0.2-1.0) 


 





 


Aspartate Amino Transf


(AST/SGOT) 


 40 U/L (15-37) 


 


 





 


Alanine Aminotransferase


(ALT/SGPT) 


 35 U/L (16-63) 


 


 





 


Alkaline Phosphatase


 


 65 U/L


() 


 





 


Troponin I Quantitative


 


 < 0.017 ng/mL


(0.000-0.055) 


 





 


Total Protein


 


 7.6 g/dL


(6.4-8.2) 


 





 


Albumin


 


 3.5 g/dL


(3.4-5.0) 


 





 


Albumin/Globulin Ratio  0.9 (1.0-1.7)   


 


Procalcitonin


 


 < 0.10 ng/mL


(0.00-0.10) 


 





 


Urine Collection Type   Unknown  


 


Urine Color   Yellow  


 


Urine Clarity   Clear  


 


Urine pH   5.5  


 


Urine Specific Gravity   1.020  


 


Urine Protein


 


 


 30 mg/dL


(NEG-TRACE) 





 


Urine Glucose (UA)


 


 


 Negative mg/dL


(NEG) 





 


Urine Ketones (Stick)


 


 


 Negative mg/dL


(NEG) 





 


Urine Blood   Small (NEG)  


 


Urine Nitrite   Negative (NEG)  


 


Urine Bilirubin   Negative (NEG)  


 


Urine Urobilinogen Dipstick


 


 


 0.2 mg/dL (0.2


mg/dL) 





 


Urine Leukocyte Esterase   Negative (NEG)  


 


Urine RBC   1-2 /HPF (0-2)  


 


Urine WBC   0 /HPF (0-4)  


 


Urine Squamous Epithelial


Cells 


 


 Few /LPF 


 





 


Urine Bacteria   0 /HPF (0-FEW)  


 


Urine Mucus   Mod /LPF  


 


Test


 11/25/19


21:28 11/26/19


03:15 11/26/19


07:54 11/26/19


11:42


 


Glucose (Fingerstick)


 149 mg/dL


(70-99) 


 122 mg/dL


(70-99) 113 mg/dL


(70-99)


 


Sodium Level


 


 137 mmol/L


(136-145) 


 





 


Potassium Level


 


 3.6 mmol/L


(3.5-5.1) 


 





 


Chloride Level


 


 103 mmol/L


() 


 





 


Carbon Dioxide Level


 


 24 mmol/L


(21-32) 


 





 


Anion Gap  10 (6-14)   


 


Blood Urea Nitrogen


 


 17 mg/dL


(8-26) 


 





 


Creatinine


 


 1.2 mg/dL


(0.7-1.3) 


 





 


Estimated GFR


(Cockcroft-Gault) 


 58.0 


 


 





 


Glucose Level


 


 123 mg/dL


(70-99) 


 





 


Calcium Level


 


 8.5 mg/dL


(8.5-10.1) 


 











Laboratory Tests








Test


 11/25/19


17:12 11/25/19


17:17 11/25/19


18:08 11/25/19


20:28


 


Influenza Type A Antigen


 Negative


(NEGATIVE) 


 


 





 


Influenza Type B Antigen


 Negative


(NEGATIVE) 


 


 





 


White Blood Count


 


 9.2 x10^3/uL


(4.0-11.0) 


 





 


Red Blood Count


 


 4.41 x10^6/uL


(4.30-5.70) 


 





 


Hemoglobin


 


 12.0 g/dL


(13.0-17.5) 


 





 


Hematocrit


 


 37.1 %


(39.0-53.0) 


 





 


Mean Corpuscular Volume  84 fL ()   


 


Mean Corpuscular Hemoglobin  27 pg (25-35)   


 


Mean Corpuscular Hemoglobin


Concent 


 32 g/dL


(31-37) 


 





 


Red Cell Distribution Width


 


 16.3 %


(11.5-14.5) 


 





 


Platelet Count


 


 141 x10^3/uL


(140-400) 


 





 


Neutrophils (%) (Auto)  87 % (31-73)   


 


Lymphocytes (%) (Auto)  4 % (24-48)   


 


Monocytes (%) (Auto)  9 % (0-9)   


 


Eosinophils (%) (Auto)  0 % (0-3)   


 


Basophils (%) (Auto)  0 % (0-3)   


 


Neutrophils # (Auto)


 


 8.0 x10^3/uL


(1.8-7.7) 


 





 


Lymphocytes # (Auto)


 


 0.4 x10^3/uL


(1.0-4.8) 


 





 


Monocytes # (Auto)


 


 0.8 x10^3/uL


(0.0-1.1) 


 





 


Eosinophils # (Auto)


 


 0.0 x10^3/uL


(0.0-0.7) 


 





 


Basophils # (Auto)


 


 0.0 x10^3/uL


(0.0-0.2) 


 





 


Segmented Neutrophils %  66 % (35-66)   


 


Band Neutrophils %  19 % (0-9)   


 


Lymphocytes %  6 % (24-48)   


 


Atypical Lymphocytes %


(Manual) 


 2 % (0-0) 


 


 





 


Monocytes %  7 % (0-10)   


 


Platelet Estimate


 


 Adequate


(ADEQUATE) 


 





 


Anisocytosis  Slight   


 


Prothrombin Time


 


 19.5 SEC


(11.7-14.0) 


 





 


Prothromb Time International


Ratio 


 1.7 (0.8-1.1) 


 


 





 


Activated Partial


Thromboplast Time 


 39 SEC (24-38) 


 


 





 


Sodium Level


 


 134 mmol/L


(136-145) 


 





 


Potassium Level


 


 4.3 mmol/L


(3.5-5.1) 


 





 


Chloride Level


 


 97 mmol/L


() 


 





 


Carbon Dioxide Level


 


 25 mmol/L


(21-32) 


 





 


Anion Gap  12 (6-14)   


 


Blood Urea Nitrogen


 


 24 mg/dL


(8-26) 


 





 


Creatinine


 


 1.5 mg/dL


(0.7-1.3) 


 





 


Estimated GFR


(Cockcroft-Gault) 


 44.8 


 


 





 


BUN/Creatinine Ratio  16 (6-20)   


 


Glucose Level


 


 156 mg/dL


(70-99) 


 





 


Lactic Acid Level


 


 2.7 mmol/L


(0.4-2.0) 


 1.2 mmol/L


(0.4-2.0)


 


Calcium Level


 


 9.0 mg/dL


(8.5-10.1) 


 





 


Magnesium Level


 


 1.7 mg/dL


(1.8-2.4) 


 





 


Total Bilirubin


 


 0.7 mg/dL


(0.2-1.0) 


 





 


Aspartate Amino Transf


(AST/SGOT) 


 40 U/L (15-37) 


 


 





 


Alanine Aminotransferase


(ALT/SGPT) 


 35 U/L (16-63) 


 


 





 


Alkaline Phosphatase


 


 65 U/L


() 


 





 


Troponin I Quantitative


 


 < 0.017 ng/mL


(0.000-0.055) 


 





 


Total Protein


 


 7.6 g/dL


(6.4-8.2) 


 





 


Albumin


 


 3.5 g/dL


(3.4-5.0) 


 





 


Albumin/Globulin Ratio  0.9 (1.0-1.7)   


 


Procalcitonin


 


 < 0.10 ng/mL


(0.00-0.10) 


 





 


Urine Collection Type   Unknown  


 


Urine Color   Yellow  


 


Urine Clarity   Clear  


 


Urine pH   5.5  


 


Urine Specific Gravity   1.020  


 


Urine Protein


 


 


 30 mg/dL


(NEG-TRACE) 





 


Urine Glucose (UA)


 


 


 Negative mg/dL


(NEG) 





 


Urine Ketones (Stick)


 


 


 Negative mg/dL


(NEG) 





 


Urine Blood   Small (NEG)  


 


Urine Nitrite   Negative (NEG)  


 


Urine Bilirubin   Negative (NEG)  


 


Urine Urobilinogen Dipstick


 


 


 0.2 mg/dL (0.2


mg/dL) 





 


Urine Leukocyte Esterase   Negative (NEG)  


 


Urine RBC   1-2 /HPF (0-2)  


 


Urine WBC   0 /HPF (0-4)  


 


Urine Squamous Epithelial


Cells 


 


 Few /LPF 


 





 


Urine Bacteria   0 /HPF (0-FEW)  


 


Urine Mucus   Mod /LPF  


 


Test


 11/25/19


21:28 11/26/19


03:15 11/26/19


07:54 11/26/19


11:42


 


Glucose (Fingerstick)


 149 mg/dL


(70-99) 


 122 mg/dL


(70-99) 113 mg/dL


(70-99)


 


Sodium Level


 


 137 mmol/L


(136-145) 


 





 


Potassium Level


 


 3.6 mmol/L


(3.5-5.1) 


 





 


Chloride Level


 


 103 mmol/L


() 


 





 


Carbon Dioxide Level


 


 24 mmol/L


(21-32) 


 





 


Anion Gap  10 (6-14)   


 


Blood Urea Nitrogen


 


 17 mg/dL


(8-26) 


 





 


Creatinine


 


 1.2 mg/dL


(0.7-1.3) 


 





 


Estimated GFR


(Cockcroft-Gault) 


 58.0 


 


 





 


Glucose Level


 


 123 mg/dL


(70-99) 


 





 


Calcium Level


 


 8.5 mg/dL


(8.5-10.1) 


 














Images


Images


CT head


 


INDICATION: Altered mental status


 


TECHNIQUE: Sequential axial images through the head were obtained without 


the administration of IV contrast.


 


Comparisons: None


 


FINDINGS:


No focal parenchymal lesion or hemorrhage is identified. There is no 


midline shift or sulcal effacement.


 


Patchy hypodensity noted within the periventricular and subcortical white 


matter. No acute vascular territory infarction is identified. Gray-white 


distinction is preserved.


 


The ventricular system is within normal limits without compression 


hydrocephalus. The basal cisterns are well maintained.


 


The visualized portions of the paranasal sinuses and mastoid air cells are


well-pneumatized. No acute fractures.


 


IMPRESSION:


Patchy hypodensities representing small vessel ischemic change, 


technically age indeterminate without prior imaging. If there are concerns


for acute ischemia MRI is recommended.





Assessment/Plan


Assessment/Plan


Impression:


Gait disorder, resolved, probably related to early sepsis and pneumonia, no 

evidence of stroke, myelopathy, radiculopathy, or other central nervous system 

issue.


There is evidence of some neuropathy, most likely from diabetes, rule out other 

causes.


Hypomagnesemia, anemia, acute kidney injury, and chronic anticoagulation for 

atrial fibrillation, patient also on aspirin 





Recommendations:


Lab studies for other causes of neuropathy


Rehabilitation modalities


Treatment of the sepsis and pneumonia, note plans to discharge if he remains 

afebrile another day or 2. I agree with that.


Fully discussed with patient and his wife.





Thank you for letting me help with the patient's care.











SIMON SALAZAR MD           Nov 26, 2019 14:18

## 2019-11-26 NOTE — CONS
DATE OF CONSULTATION:  11/26/2019



REQUESTING PHYSICIAN:  Dr. Martinez.



REASON FOR CONSULTATION:  Pneumonia.



HISTORY OF PRESENT ILLNESS:  This is an 84-year-old  gentleman who came

in with cough, shortness of breath and fever started about Friday, he says.  The

patient went to the urgent care and was started on doxycycline.  He may have

taken one or two doses and he ended up in the hospital.  The patient has had

103.6 temperature.  CT scan of the chest, abdomen and pelvis showed left lung

base pneumonia.  The patient denies any nausea, vomiting, diarrhea.  Denies any

chest pain, shortness of breath, abdominal pain, urinary symptoms or bowel

symptoms.



PAST MEDICAL HISTORY:  Positive for coronary artery disease, coronary artery

bypass surgery, pacemaker in place, diabetes, hypertension, prostate cancer, eye

cancer with surgery done on the eye, prostatectomy.  He does have defibrillator

in place.



SOCIAL HISTORY:  Negative for smoking, alcohol or illicit drug use.  The patient

has a grandkid at home, who had been sick recently and his mother was also sick

recently.



CURRENT MEDICATIONS:  Reviewed.



REVIEW OF SYSTEMS:  As per HPI, all other systems reviewed are negative.



PHYSICAL EXAMINATION:

GENERAL:  Alert, oriented gentleman, not in distress.

VITAL SIGNS:  Stable with a T-max 103.6, pulse 96, respirations 18, blood

pressure 127/64.

HEENT:  Both pupils are round and reacting.  No conjunctival lesion, no lesion

in the mouth.

NECK:  Supple, no JVP, no lymphadenopathy.

LUNGS:  Clear.

HEART:  S1, S2 regular.

ABDOMEN:  Benign.

EXTREMITIES:  No edema, cyanosis.

SKIN:  Unremarkable.

NEUROLOGIC:  The patient is neurologically alert, awake and appropriate.  No

focal neurologic deficit.



LABORATORY DATA:  White count is 9.2, hemoglobin 12.0, platelets of 141,000. 

BUN and creatinine are 24 and 1.5, which has improved to 1.2.  Lactic acid was

2.7, which is improved to 1.2.  Urinalysis unremarkable.  Influenza screen is

negative.  Chest x-ray was unremarkable.  CT showed pneumonia.  Head CT is

unremarkable.



IMPRESSION:

1.  Community-acquired pneumonia.

2.  Fever.

3.  Acute kidney injury, which improved.

4.  Coronary artery disease.

5.  Hypertension.



RECOMMENDATIONS:  Would use Rocephin, doxycycline, and Tamiflu.

check cultures

supportive care

d/w wife



Thank you very much, Dr. Martinez, for giving me the opportunity to participate in

this patient's care.

 



______________________________

ROSENDO NUNN MD



DR:  ELLI/brenda  JOB#:  [id number] / [id number]

DD:  11/26/2019 12:53  DT:  11/26/2019 13:12

CALVIN

## 2019-11-26 NOTE — NUR
Pt. arrived on unit at 2110 11/25 by bed from ER. Pt. is A&Ox4, on RA and VSS. Pt. does not 
complain of pain but would like to sleep. Admission questions were answered by pt.'s wife, 
Alisha Paul. Assessment was done on pt. Call light was given and bed was placed in lowest 
position with alarm on. Will continue to monitor.

## 2019-11-26 NOTE — HP
ADMIT DATE:  11/25/2019



CHIEF COMPLAINT:  Fevers and weakness.



HISTORY OF PRESENT ILLNESS:  The patient is a pleasant 84-year-old male, who

used to work as an .  He is now retired.  Basically, he went to the

doctor's office yesterday, got some doxycycline and was sent home.  He just did

not do well.  He developed a fever.  He is weak, so his wife, who works here as

a volunteer, called the ambulance.  They brought him into the ER.  While in the

ER, he is noted to be septic with a high lactic acid of 2.7, his fever was

103.6, his respiratory rate was 22 and his pulse was high above 90.  Basically,

he has been admitted with sepsis.  Of incidental note, he was also

hypomagnesemic and he had some kidney failure with a creatinine of 1.5.  He is

also anemic with hemoglobin of 12.  His INR is 1.7, but he is on blood thinners.

 CT of the chest is showing a possible left-sided pneumonia.  Overall, the

patient is now on the medical floor where he is being examined with his wife

present.  She is good support for him.



PAST MEDICAL HISTORY:  Diabetes, hypertension, coronary artery disease, eye

cancer, prostate cancer, angioplasty, coronary artery bypass grafting, AICD

defibrillator, prostatectomy, left eye surgery, cardiac stents.  He has also had

to have his defibrillator replaced actually.  Previous pneumonia, previous UTIs.



ALLERGIES:  MILK, SULFA, and DAIRY PRODUCTS.



FAMILY HISTORY:  Coronary artery disease in the family.



SOCIAL HISTORY:  He is retired.  His wife works here as a volunteer.  He does

not drink, smoke or take drugs.  He was an .



MEDICATIONS:  He is on 17 home medications including Claritin, Flomax, iron,

Eliquis, Plavix, amiodarone, atorvastatin, metoprolol, fluticasone, magnesium

oxide, Colace, MiraLax, ranitidine, metformin, vitamins and melatonin.



REVIEW OF SYSTEMS:  

GENERAL:  He complains of weakness.

SKIN:  No bruising, hair changes or rashes.

EYES:  No blurred, double or loss of vision.

NOSE AND THROAT:  No history of nosebleeds, hoarseness or sore throat.

HEART:  No history of palpitations, chest pain or shortness of breath on

exertion.

LUNGS:  Denies cough, hemoptysis, wheezing or shortness of breath.

GASTROINTESTINAL:  Denies changes in appetite, nausea, vomiting, diarrhea or

constipation.

GENITOURINARY:  No history of frequency, urgency, hesitancy or nocturia.

NEUROLOGIC:  Denies history of numbness, tingling, tremor or weakness.

PSYCHIATRIC:  No history of panic, anxiety or depression.

ENDOCRINE:  No history of heat or cold intolerance, polyuria or polydipsia.

EXTREMITIES:  Denies muscle weakness, joint pain, pain on walking or stiffness.



PHYSICAL EXAMINATION:

VITAL SIGNS:  Currently, his temperature is back down to normal at 97.6, but

again, he was as high as 103.6, pulse currently at 88, but his highest pulse was

120, respirations currently at 18, but he was as high as 21, blood pressure

currently at 127/64, his highest was 160/90.

GENERAL:  No apparent distress.  Alert and oriented.

HEENT:  Normal cephalic atraumatic, external auditory canals are patent

EYES:  Extraocular muscles are intact, pupils are equally round and reactive to

light and accommodation

MUSKULOSKELETAL:  Well developed, well nourished, good range of motion

ENDOCRINE:  No thyromegaly was palpated

LYMPHATICS:  No cervical chain or axillary nodes were noted

HEMATOPOIETIC:  No bruising

NECK:  Supple, no JVD, no thyromegaly was noted.

LUNGS:  Clear to auscultation in all lung fields without rhonchi or wheezing.

HEART:  RRR, S1, S2 present.  Peripheral pulses intact, no obvious murmurs were

noted.

ABDOMEN:  Soft, nontender.  Positive bowel sounds no organomegaly, normal bowel

sounds.

EXTREMITIES:  Without any cyanosis, clubbing.  Pedal pulses intact, Homans sign

is negative.  Trace edema.

NEUROLOGIC:  He is alert and oriented, but very weak.

PSYCHIATRIC:  Normal affect, normal mood.  Stable.

SKIN:  No ulcerations or rashes, good skin turgor, no jaundice.

VASCULAR:  Good capillary refill, neurovascular bundle appears to be intact.



IMAGING DATA:  CT of the chest shows a possible pneumonia in the left base.  I

will be consulting Dr. Birch for a second opinion regarding that.  CT of the head

was surprisingly normal other than some small vessel disease.  Chest x-ray did

not show any acute changes.



LABORATORY DATA:  White count 9, hemoglobin 12, and platelets 141. 

Electrolytes:  Sodium 137, potassium 3.6, chloride 103, bicarb 24, BUN 17,

creatinine 1.2.  Previous BUN was as high as 24 and his creatinine was 1.5 last

night.  Glucose is 122.



ASSESSMENT AND PLAN:  Fevers, severe sepsis, hypomagnesemia, anemia, acute

kidney injury, and chronic anticoagulation in an elderly male with the above

noted comorbidities.  For now, I am going to get his home meds going.  We gave

him IV fluids, IV antibiotics.  Consult Infectious Disease, consult Pulmonary

Medicine.  Deep venous thrombosis prophylaxis.  He is DO NOT RESUSCITATE.  We

will consider breathing treatments and oxygen if Pulmonary agrees.  Clear liquid

diet, p.r.n. Zofran, p.r.n. fentanyl.

 



______________________________

ERASTO NOLAN DO



DR:  PAULIE/brenda  JOB#:  [id number] / [id number]

DD:  11/26/2019 10:39  DT:  11/26/2019 11:00



DOLORES Rodríguez MD

## 2019-11-26 NOTE — EKG
[facility]

              [address] [place] [address]

Test Date:    2019               Test Time:    19:11:15

Pat Name:     DORIAN WILSON           Department:   

Patient ID:   [id number]           Room:         412 1

Gender:       M                        Technician:   

:          [id number]               Requested By: DOLORES BELL

Order Number: [id number]           Reading MD:   Ryne Olivares MD

                                 Measurements

Intervals                              Axis          

Rate:         91                       P:            

LA:                                    QRS:          138

QRSD:         104                      T:            -26

QT:           406                                    

QTc:          501                                    

                           Interpretive Statements

ATRIAL FIBRILLATION

VENTRICULAR PREMATURE COMPLEX(ES)

RBBB

RAD





Electronically Signed On 2019 15:06:43 CST by Ryne Olivares MD

## 2019-11-26 NOTE — CONS
DATE OF CONSULTATION:  11/26/2019



PULMONARY CONSULTATION



ATTENDING PHYSICIAN:  Dr. Martinez.



REASON FOR CONSULTATION:  Pneumonia.



HISTORY OF PRESENT ILLNESS:  The patient is an 84-year-old who has no

significant tobacco history.  He is a retired .  He presented to his

PCP with fever of 103 and received some doxycycline.  The patient was feeling

weak.  He went to [facility] and was very weak.  The wife called the ambulance.  He

was brought into the hospital as he had a fever of 103.6 and his respirations

were 22 with a pulse of 90.  His lactic acid was 2.7.  The patient underwent

imaging study and I had reviewed his CT chest, abdomen and pelvis report.  I

have personally reviewed the CT chest.  He has a left lower lobe infiltrate,

which is a new finding compared to his CT from 2018.  There was no other source

of infection identified in the abdomen or pelvis.  The patient's fever pattern

is improving.  His T-max is 100.1 yesterday and now he is afebrile.  The patient

received doxycycline and Rocephin.  Infectious Disease has also been consulted. 

He denies any headaches, no nausea, no vomiting, no diarrhea.  No dysuria, no

focal weakness.  No skin rash.



PAST MEDICAL HISTORY:  Significant for history of diabetes, hypertension,

coronary artery disease, eye cancers, prostate cancer, angioplasty, coronary

artery bypass grafting, AICD defibrillator, prostatectomy, cardiac stents.



PAST SURGICAL HISTORY:  As discussed above.



ALLERGIES:  MILK, SULFA AND DAIRY PRODUCTS.



FAMILY HISTORY:  Coronary artery disease.



SOCIAL HISTORY:  Retired and wife works as a volunteer.  He does not smoke.



MEDICATIONS:  Reviewed as listed in the MRAD.



REVIEW OF SYSTEMS:  A 12-point system review obtained.  Pertinent positives

discussed in my history of present illness, otherwise noncontributory.  All

systems that were negative were reviewed as well.



PHYSICAL EXAMINATION:

VITAL SIGNS:  Reviewed.  T-max of 100.1 last night and on admission was 103.6. 

His blood pressure is stable.  Pulse ox 96% on room air.

NECK:  Supple, no JVD.

LUNGS:  With crackles, left base.

CARDIOVASCULAR:  With a regular rate.

ABDOMEN:  Soft, nontender.

EXTREMITIES:  With no pitting edema.



LABORATORY DATA:  Reviewed.  His Influenza screen negative.  White cell count

9.2, hemoglobin 12.0, platelets are 141.  BUN 17, creatinine 1.2.



IMPRESSION:

1.  Dyspnea secondary to early sepsis.  His lactic acid was 2.7 on admission.

2.  High-grade fever with left lower lobe infiltrate seen on the CT chest and

crackles on examination.  The findings are consistent with pneumonia, could be

either viral versus bacterial.  As the wife states that he did have a cough with

some yellow-green sputum.

3.  No significant tobacco history.



RECOMMENDATIONS:

1.  Continue with present antibiotics.  Infectious Disease has been consulted

and we will follow their recommendation.

2.  The patient was also started on Tamiflu and that is okay from a pulmonary

standpoint.

3.  P.r.n. bronchodilators.

4.  The patient is on chronic Eliquis.

5.  Follow the response to treatment and fever pattern will be monitored

closely.

6.  If patient remains afebrile for the next 24 hours, he could be discharged in

the next 24-48 hours.

7.  Discussed with Dr. Mingo Jackson.  Discussed with the patient's wife and will

follow along with you.

 



______________________________

MICHELE ARREGUIN MD DR:  JAVI/brenda  JOB#:  [id number] / [id number]

DD:  11/26/2019 12:42  DT:  11/26/2019 13:00

## 2019-11-27 VITALS — DIASTOLIC BLOOD PRESSURE: 82 MMHG | SYSTOLIC BLOOD PRESSURE: 109 MMHG

## 2019-11-27 VITALS — SYSTOLIC BLOOD PRESSURE: 105 MMHG | DIASTOLIC BLOOD PRESSURE: 56 MMHG

## 2019-11-27 VITALS — SYSTOLIC BLOOD PRESSURE: 132 MMHG | DIASTOLIC BLOOD PRESSURE: 49 MMHG

## 2019-11-27 LAB
ANION GAP SERPL CALC-SCNC: 9 MMOL/L (ref 6–14)
BASOPHILS # BLD AUTO: 0 X10^3/UL (ref 0–0.2)
BASOPHILS NFR BLD: 1 % (ref 0–3)
BUN SERPL-MCNC: 18 MG/DL (ref 8–26)
CALCIUM SERPL-MCNC: 8.7 MG/DL (ref 8.5–10.1)
CHLORIDE SERPL-SCNC: 106 MMOL/L (ref 98–107)
CO2 SERPL-SCNC: 27 MMOL/L (ref 21–32)
CREAT SERPL-MCNC: 1.3 MG/DL (ref 0.7–1.3)
EOSINOPHIL NFR BLD: 0.1 X10^3/UL (ref 0–0.7)
EOSINOPHIL NFR BLD: 3 % (ref 0–3)
ERYTHROCYTE [DISTWIDTH] IN BLOOD BY AUTOMATED COUNT: 16 % (ref 11.5–14.5)
GFR SERPLBLD BASED ON 1.73 SQ M-ARVRAT: 52.9 ML/MIN
GLUCOSE SERPL-MCNC: 101 MG/DL (ref 70–99)
HBA1C MFR BLD: 5.7 % (ref 4.8–5.6)
HCT VFR BLD CALC: 33.5 % (ref 39–53)
HGB BLD-MCNC: 10.9 G/DL (ref 13–17.5)
LYMPHOCYTES # BLD: 0.5 X10^3/UL (ref 1–4.8)
LYMPHOCYTES NFR BLD AUTO: 11 % (ref 24–48)
MCH RBC QN AUTO: 28 PG (ref 25–35)
MCHC RBC AUTO-ENTMCNC: 33 G/DL (ref 31–37)
MCV RBC AUTO: 84 FL (ref 79–100)
MONO #: 0.5 X10^3/UL (ref 0–1.1)
MONOCYTES NFR BLD: 11 % (ref 0–9)
NEUT #: 3.4 X10^3/UL (ref 1.8–7.7)
NEUTROPHILS NFR BLD AUTO: 75 % (ref 31–73)
PLATELET # BLD AUTO: 106 X10^3/UL (ref 140–400)
POTASSIUM SERPL-SCNC: 3.7 MMOL/L (ref 3.5–5.1)
RBC # BLD AUTO: 3.97 X10^6/UL (ref 4.3–5.7)
SODIUM SERPL-SCNC: 142 MMOL/L (ref 136–145)
WBC # BLD AUTO: 4.6 X10^3/UL (ref 4–11)

## 2019-11-27 RX ADMIN — AMIODARONE HYDROCHLORIDE SCH MG: 200 TABLET ORAL at 08:21

## 2019-11-27 RX ADMIN — POLYETHYLENE GLYCOL 3350 SCH GM: 17 POWDER, FOR SOLUTION ORAL at 08:21

## 2019-11-27 RX ADMIN — APIXABAN SCH MG: 5 TABLET, FILM COATED ORAL at 08:19

## 2019-11-27 RX ADMIN — MAGNESIUM OXIDE TAB 400 MG (241.3 MG ELEMENTAL MG) SCH MG: 400 (241.3 MG) TAB at 08:19

## 2019-11-27 RX ADMIN — DOXYCYCLINE HYCLATE SCH MG: 100 TABLET, COATED ORAL at 08:19

## 2019-11-27 RX ADMIN — METOPROLOL SUCCINATE SCH MG: 25 TABLET, EXTENDED RELEASE ORAL at 08:20

## 2019-11-27 RX ADMIN — OSELTAMIVIR PHOSPHATE SCH MG: 75 CAPSULE ORAL at 08:20

## 2019-11-27 RX ADMIN — CLOPIDOGREL BISULFATE SCH MG: 75 TABLET ORAL at 08:19

## 2019-11-27 RX ADMIN — FAMOTIDINE SCH MG: 20 TABLET ORAL at 08:19

## 2019-11-27 RX ADMIN — DOCUSATE SODIUM SCH MG: 100 CAPSULE, LIQUID FILLED ORAL at 08:20

## 2019-11-27 NOTE — NUR
SW following. Chart reviewed, discussed with RN, pt is wanting to leave, gets around fine 
per RN. RN advised no SW needs at this time. SW will continue to follow should any discharge 
needs arise.

## 2019-12-02 LAB
ALBUM: 2.8 G/DL (ref 2.9–4.4)
ALPHA1 GLOB SERPL ELPH-MCNC: 0.3 G/DL (ref 0–0.4)
ALPHA2 GLOB SERPL ELPH-MCNC: 0.9 G/DL (ref 0.4–1)
B-GLOBULIN SERPL ELPH-MCNC: 0.9 G/DL (ref 0.7–1.3)
GAMMA GLOB SERPL ELPH-MCNC: 0.4 G/DL (ref 0.4–1.8)
PROT SERPL-MCNC: 5.3 G/DL (ref 6–8.5)
SPEP AG RATIO: 1.1 (ref 0.7–1.7)

## 2019-12-02 NOTE — DS
DATE OF DISCHARGE:  11/27/2019



ADMISSION DIAGNOSES:  Sepsis, hypomagnesemia and acute kidney injury.



DISCHARGE DIAGNOSIS:  Resolving sepsis.



HOSPITAL COURSE:  The patient is a pleasant 84-year-old male who presented with

hypomagnesemia, acute kidney injury with a bump in his creatinine and sepsis. 

He was admitted.  We started him on IV antibiotics and breathing treatments

because his CAT scan showed possible lower lobe pneumonia.  I also consulted the

Pulmonary Medicine.  We replaced his electrolytes.  We trended his creatinine

and over the next 48 hours, he returned to his baseline, we discharged to home

with close outpatient followup.



DISPOSITION:  Home.



ACTIVITY:  As tolerated.



DIET:  Low sodium.



MEDICATIONS:  Please see MRAD.



TOTAL TIME:  34 minutes.

 



______________________________

ERASTO NOLAN DO



DR:  Chelsea  JOB#:  [id number] / [id number]

DD:  12/02/2019 09:05  DT:  12/02/2019 09:13

## 2020-01-17 ENCOUNTER — HOSPITAL ENCOUNTER (EMERGENCY)
Dept: HOSPITAL 61 - ER | Age: 85
Discharge: HOME | End: 2020-01-17
Payer: MEDICARE

## 2020-01-17 VITALS — DIASTOLIC BLOOD PRESSURE: 72 MMHG | SYSTOLIC BLOOD PRESSURE: 149 MMHG

## 2020-01-17 DIAGNOSIS — Z91.011: ICD-10-CM

## 2020-01-17 DIAGNOSIS — Z95.5: ICD-10-CM

## 2020-01-17 DIAGNOSIS — I11.9: ICD-10-CM

## 2020-01-17 DIAGNOSIS — R31.9: ICD-10-CM

## 2020-01-17 DIAGNOSIS — Z95.0: ICD-10-CM

## 2020-01-17 DIAGNOSIS — E11.9: ICD-10-CM

## 2020-01-17 DIAGNOSIS — Z90.79: ICD-10-CM

## 2020-01-17 DIAGNOSIS — N39.0: Primary | ICD-10-CM

## 2020-01-17 DIAGNOSIS — Z95.1: ICD-10-CM

## 2020-01-17 DIAGNOSIS — Z88.2: ICD-10-CM

## 2020-01-17 DIAGNOSIS — D64.9: ICD-10-CM

## 2020-01-17 LAB
ALBUMIN SERPL-MCNC: 3.4 G/DL (ref 3.4–5)
ALBUMIN/GLOB SERPL: 1.1 {RATIO} (ref 1–1.7)
ALP SERPL-CCNC: 66 U/L (ref 46–116)
ALT SERPL-CCNC: 43 U/L (ref 16–63)
ANION GAP SERPL CALC-SCNC: 9 MMOL/L (ref 6–14)
APTT BLD: 21 SEC (ref 24–38)
APTT PPP: (no result) S
AST SERPL-CCNC: 46 U/L (ref 15–37)
BACTERIA #/AREA URNS HPF: (no result) /HPF
BASOPHILS # BLD AUTO: 0 X10^3/UL (ref 0–0.2)
BASOPHILS NFR BLD: 1 % (ref 0–3)
BILIRUB SERPL-MCNC: 0.6 MG/DL (ref 0.2–1)
BILIRUB UR QL STRIP: NEGATIVE
BUN SERPL-MCNC: 17 MG/DL (ref 8–26)
BUN/CREAT SERPL: 15 (ref 6–20)
CALCIUM SERPL-MCNC: 8.9 MG/DL (ref 8.5–10.1)
CHLORIDE SERPL-SCNC: 103 MMOL/L (ref 98–107)
CO2 SERPL-SCNC: 28 MMOL/L (ref 21–32)
CREAT SERPL-MCNC: 1.1 MG/DL (ref 0.7–1.3)
EOSINOPHIL NFR BLD: 0.1 X10^3/UL (ref 0–0.7)
EOSINOPHIL NFR BLD: 2 % (ref 0–3)
ERYTHROCYTE [DISTWIDTH] IN BLOOD BY AUTOMATED COUNT: 18.3 % (ref 11.5–14.5)
FIBRINOGEN PPP-MCNC: (no result) MG/DL
GFR SERPLBLD BASED ON 1.73 SQ M-ARVRAT: 64.1 ML/MIN
GLOBULIN SER-MCNC: 3.1 G/DL (ref 2.2–3.8)
GLUCOSE SERPL-MCNC: 113 MG/DL (ref 70–99)
HCT VFR BLD CALC: 31.9 % (ref 39–53)
HGB BLD-MCNC: 10.4 G/DL (ref 13–17.5)
LYMPHOCYTES # BLD: 0.6 X10^3/UL (ref 1–4.8)
LYMPHOCYTES NFR BLD AUTO: 12 % (ref 24–48)
MCH RBC QN AUTO: 29 PG (ref 25–35)
MCHC RBC AUTO-ENTMCNC: 33 G/DL (ref 31–37)
MCV RBC AUTO: 87 FL (ref 79–100)
MONO #: 0.4 X10^3/UL (ref 0–1.1)
MONOCYTES NFR BLD: 9 % (ref 0–9)
NEUT #: 3.8 X10^3/UL (ref 1.8–7.7)
NEUTROPHILS NFR BLD AUTO: 77 % (ref 31–73)
NITRITE UR QL STRIP: NEGATIVE
PH UR STRIP: 7.5 [PH]
PLATELET # BLD AUTO: 132 X10^3/UL (ref 140–400)
POTASSIUM SERPL-SCNC: 4.6 MMOL/L (ref 3.5–5.1)
PROT SERPL-MCNC: 6.5 G/DL (ref 6.4–8.2)
PROT UR STRIP-MCNC: 100 MG/DL
PROTHROMBIN TIME: 15.8 SEC (ref 11.7–14)
RBC # BLD AUTO: 3.64 X10^6/UL (ref 4.3–5.7)
RBC #/AREA URNS HPF: (no result) /HPF (ref 0–2)
SODIUM SERPL-SCNC: 140 MMOL/L (ref 136–145)
SQUAMOUS #/AREA URNS LPF: (no result) /LPF
UROBILINOGEN UR-MCNC: 1 MG/DL
WBC # BLD AUTO: 5 X10^3/UL (ref 4–11)

## 2020-01-17 PROCEDURE — 83605 ASSAY OF LACTIC ACID: CPT

## 2020-01-17 PROCEDURE — 99285 EMERGENCY DEPT VISIT HI MDM: CPT

## 2020-01-17 PROCEDURE — 85025 COMPLETE CBC W/AUTO DIFF WBC: CPT

## 2020-01-17 PROCEDURE — 96374 THER/PROPH/DIAG INJ IV PUSH: CPT

## 2020-01-17 PROCEDURE — 36415 COLL VENOUS BLD VENIPUNCTURE: CPT

## 2020-01-17 PROCEDURE — 85730 THROMBOPLASTIN TIME PARTIAL: CPT

## 2020-01-17 PROCEDURE — 85610 PROTHROMBIN TIME: CPT

## 2020-01-17 PROCEDURE — 81001 URINALYSIS AUTO W/SCOPE: CPT

## 2020-01-17 PROCEDURE — 87086 URINE CULTURE/COLONY COUNT: CPT

## 2020-01-17 PROCEDURE — 80053 COMPREHEN METABOLIC PANEL: CPT

## 2020-01-17 NOTE — PHYS DOC
Past Medical History


Past Medical History:  Diabetes-Type II, Heart Disease, Hypertension


Additional Past Medical Histor:  Eye Cancer, Prostate Cancer


Past Surgical History:  Angioplasty, Coronary Bypass Surgery, Pacemaker


Additional Past Surgical Histo:  Prostatectomy, Left Eye Surgery, Defib 

Placement, CARDIAC STENTS


Alcohol Use:  Occasionally


Drug Use:  None





Adult General


Chief Complaint


Chief Complaint:  BLOOD IN URINE





\[facility] [place] [facility]\""


HPI





Patient is a 84  year old male with history of hypertension, cardiac disease and

pacemaker placement and atrial fibrillation on Plavix and Eliquis, diabetes 

mellitus and prostate cancer who presents with complaint of bloody urine. 

Patient complaining of hematuria since this morning with mild lower abdominal 

discomfort without dizziness, fever and chills, nausea and vomiting, other 

bleeding. Patient states he had dysuria 3 days ago and his primary care 

physician ordered cephalexin and he started the first dose tonight.





Review of Systems


Review of Systems





Constitutional: Denies fever or chills []


Eyes: Denies change in visual acuity, redness, or eye pain []


HENT: Denies nasal congestion or sore throat []


Respiratory: Denies cough or shortness of breath []


Cardiovascular: No additional information not addressed in HPI []


GI: Denies abdominal pain, nausea, vomiting, bloody stools or diarrhea []


: Post dysuria and hematuria


Musculoskeletal: Denies back pain or joint pain []


Integument: Denies rash or skin lesions []


Neurologic: Denies headache, focal weakness or sensory changes []


Endocrine: Denies polyuria or polydipsia []





All other systems were reviewed and found to be within normal limits, except as 

documented in this note.





Allergies


Allergies





Allergies








Coded Allergies Type Severity Reaction Last Updated Verified


 


  Milk Containing Products Allergy Intermediate  11/25/19 Yes


 


  Sulfa (Sulfonamide Antibiotics) Allergy Mild  11/26/19 Yes











Physical Exam


Physical Exam








Constitutional: Well developed, well nourished, mild distress, non-toxic mayg

earance. []


HENT: Normocephalic, atraumatic.


Eyes: PERRLA, EOMI, conjunctiva normal, no discharge. [] 


Neck: Normal range of motion, no tenderness, supple, no stridor. [] 


Cardiovascular:Heart rate regular rhythm, no murmur []


Lungs & Thorax:  Bilateral breath sounds clear to auscultation []


Abdomen: Bowel sounds normal, soft, no tenderness, no masses, no pulsatile 

masses. [] 


Skin: Warm, dry, no erythema, no rash, mild ecchymosis in right groin and medial

 side of right thigh against his recent right femoral procedure for ablation of 

atrial fibrillation. [] 


Back: No tenderness, no CVA tenderness. [] 


Extremities: No tenderness, no cyanosis, no clubbing, ROM intact, no edema. [] 


Neurologic: Alert and oriented X 3, no focal deficits noted. []


Psychologic: Affect normal, judgement normal, mood normal. []





Current Patient Data


Vital Signs





                                   Vital Signs








  Date Time  Temp Pulse Resp B/P (MAP) Pulse Ox O2 Delivery O2 Flow Rate FiO2


 


1/17/20 18:52 97.6 55 12 149/68 (95) 98 Room Air  





 97.6       








Lab Values





                                Laboratory Tests








Test


 1/17/20


19:04 1/17/20


19:08 1/17/20


19:30


 


Urine Collection Type Void    


 


Urine Color Red    


 


Urine Clarity Cloudy    


 


Urine pH 7.5    


 


Urine Specific Gravity 1.010    


 


Urine Protein


 100 mg/dL


(NEG-TRACE) 


 





 


Urine Glucose (UA)


 Negative mg/dL


(NEG) 


 





 


Urine Ketones (Stick)


 Trace mg/dL


(NEG) 


 





 


Urine Blood Large (NEG)    


 


Urine Nitrite


 Negative (NEG)


 


 





 


Urine Bilirubin


 Negative (NEG)


 


 





 


Urine Urobilinogen Dipstick


 1.0 mg/dL (0.2


mg/dL) 


 





 


Urine Leukocyte Esterase Small (NEG)    


 


Urine RBC


 Field obscured


/HPF (0-2) 


 





 


Urine WBC


 11-20 /HPF


(0-4) 


 





 


Urine Squamous Epithelial


Cells Occ /LPF  


 


 





 


Urine Bacteria


 Few /HPF


(0-FEW) 


 





 


Urine Mucus Slight /LPF    


 


Prothrombin Time


 


 15.8 SEC


(11.7-14.0)  H 





 


Prothrombin Time INR


 


 1.3 (0.8-1.1)


H 





 


Activated Partial


Thromboplast Time 


 21 SEC (24-38)


L 





 


White Blood Count


 


 


 5.0 x10^3/uL


(4.0-11.0)


 


Red Blood Count


 


 


 3.64 x10^6/uL


(4.30-5.70)  L


 


Hemoglobin


 


 


 10.4 g/dL


(13.0-17.5)  L


 


Hematocrit


 


 


 31.9 %


(39.0-53.0)  L


 


Mean Corpuscular Volume


 


 


 87 fL ()





 


Mean Corpuscular Hemoglobin   29 pg (25-35)  


 


Mean Corpuscular Hemoglobin


Concent 


 


 33 g/dL


(31-37)


 


Red Cell Distribution Width


 


 


 18.3 %


(11.5-14.5)  H


 


Platelet Count


 


 


 132 x10^3/uL


(140-400)  L


 


Neutrophils (%) (Auto)   77 % (31-73)  H


 


Lymphocytes (%) (Auto)   12 % (24-48)  L


 


Monocytes (%) (Auto)   9 % (0-9)  


 


Eosinophils (%) (Auto)   2 % (0-3)  


 


Basophils (%) (Auto)   1 % (0-3)  


 


Neutrophils # (Auto)


 


 


 3.8 x10^3/uL


(1.8-7.7)


 


Lymphocytes # (Auto)


 


 


 0.6 x10^3/uL


(1.0-4.8)  L


 


Monocytes # (Auto)


 


 


 0.4 x10^3/uL


(0.0-1.1)


 


Eosinophils # (Auto)


 


 


 0.1 x10^3/uL


(0.0-0.7)


 


Basophils # (Auto)


 


 


 0.0 x10^3/uL


(0.0-0.2)


 


Sodium Level


 


 


 140 mmol/L


(136-145)


 


Potassium Level


 


 


 4.6 mmol/L


(3.5-5.1)


 


Chloride Level


 


 


 103 mmol/L


()


 


Carbon Dioxide Level


 


 


 28 mmol/L


(21-32)


 


Anion Gap   9 (6-14)  


 


Blood Urea Nitrogen


 


 


 17 mg/dL


(8-26)


 


Creatinine


 


 


 1.1 mg/dL


(0.7-1.3)


 


Estimated GFR


(Cockcroft-Gault) 


 


 64.1  





 


BUN/Creatinine Ratio   15 (6-20)  


 


Glucose Level


 


 


 113 mg/dL


(70-99)  H


 


Lactic Acid Level


 


 


 2.0 mmol/L


(0.4-2.0)


 


Calcium Level


 


 


 8.9 mg/dL


(8.5-10.1)


 


Total Bilirubin


 


 


 0.6 mg/dL


(0.2-1.0)


 


Aspartate Amino Transferase


(AST) 


 


 46 U/L (15-37)


H


 


Alanine Aminotransferase (ALT)


 


 


 43 U/L (16-63)





 


Alkaline Phosphatase


 


 


 66 U/L


()


 


Total Protein


 


 


 6.5 g/dL


(6.4-8.2)


 


Albumin


 


 


 3.4 g/dL


(3.4-5.0)


 


Albumin/Globulin Ratio   1.1 (1.0-1.7)  





                                Laboratory Tests


1/17/20 19:30








                                Laboratory Tests


1/17/20 19:30











EKG


EKG


[]





Radiology/Procedures


Radiology/Procedures


[]





Course & Med Decision Making


Course & Med Decision Making


Pertinent Labs  reviewed. (See chart for details)





Evaluation of patient in ER showed 84-year-old male patient on call clinician 

medication with complaining of hematuria since this morning and history of 

recent UTI. Patient had unremarkable physical exam. Patient had 38 ML of urine 

in bladder scan after urination. Patient had hemoglobin of 10.5 with previous 

hgb of 10.9 in December. Patient did not have elevation of lactic acid or 

leukocytosis. Patient treated with Rocephin in ER and was advised to continue 

cephalexin ordered by primary care physician. Patient was advised to follow-up 

with his primary care physician in 2 days and increase fluid intake. He was 

advised to continue and to call clinician medication and follow up with primary 

care physician for possible stopping the medication





I've spoken with the patient and/or caregivers. I've explained the patient's 

condition, diagnosis and treatment plan based on information available to me at 

this time. I've answered the patient's and/or caregivers questions and addressed

 any concerns. The patient and/or caregivers have a good understanding the 

patient's diagnosis, condition and treatment plan as can be expected at this 

point. Vital signs have been stabilized. The patient's condition is stable for 

discharge from the emergency department.





The patient will pursue further outpatient evaluation with her primary care 

provider or other designated consulting physician as outlined in the discharge 

instructions. Patient and/or caregivers are agreeable to this plan of care and 

follow-up instructions have been explained in detail. The patient and/or 

caregivers have received these instructions in written format and expressed u

nderstanding of these discharge instructions. The patient and her caregivers are

 aware that if any significant change in condition or worsening of symptoms 

should prompt him to immediately return to this of the closest emergency 

department.  If an emergent department is not readily available I would 

encourage him to call 911.





Gabrielaon Disclaimer


Dragon Disclaimer


This electronic medical record was generated, in whole or in part, using a voice

 recognition dictation system.





Departure


Departure


Impression:  


   Primary Impression:  


   Hematuria


   Additional Impressions:  


   Urinary tract infection


   Chronic anemia


Disposition:  01 HOME, SELF-CARE (at 2033)


Condition:  STABLE


Referrals:  


DOLORES CALLEJAS MD (PCP)


Patient Instructions:  Hematuria, Adult, Urinary Tract Infection





Additional Instructions:  


Drink plenty of liquids


Follow-up with your primary care physician in 2 days


Return to ER if not getting better


Continue home cephalexin





Thank you for visiting [facility]. We appreciate you trusting us 

with your care. If any additional problems come up don't hesitate to return to 

visit us. Please follow up with your primary care provider so they can plan 

additional care if needed and know about the problem that you had. If symptoms 

worsen come back to the Emergency Department. Any concerning symptoms that start

 such as chest pain, shortness of air, weakness or numbness on one side of the 

body, running high fevers or any other concerning symptoms return to the ER.





Problem Qualifiers








   Primary Impression:  


   Hematuria


   Hematuria type:  unspecified type  Qualified Codes:  R31.9 - Hematuria, 

   unspecified


   Additional Impressions:  


   Urinary tract infection


   Urinary tract infection type:  site unspecified  Hematuria presence:  with 

   hematuria  Qualified Codes:  N39.0 - Urinary tract infection, site not 

   specified; R31.9 - Hematuria, unspecified








QI DAWN MD             Jan 17, 2020 19:40

## 2021-05-05 ENCOUNTER — HOSPITAL ENCOUNTER (OUTPATIENT)
Dept: HOSPITAL 63 - US | Age: 86
End: 2021-05-05
Payer: MEDICARE

## 2021-05-05 DIAGNOSIS — I70.203: Primary | ICD-10-CM

## 2021-05-05 DIAGNOSIS — Z87.891: ICD-10-CM

## 2021-05-05 DIAGNOSIS — I10: ICD-10-CM

## 2021-05-05 PROCEDURE — 93925 LOWER EXTREMITY STUDY: CPT

## 2021-05-05 NOTE — RAD
US DPLX ARTR EXTREM LOWER BILAT



Indication: Reason: COLD FEET, HTN, HX OF SMOKING, DIABETIC / Spl. Instructions:  / History:  



Comparison: None.



Procedure: Real-time grayscale, color flow Doppler, and Doppler spectral waveform analysis of the art
erial system of the lower extremity is performed.



Findings: 

Right lower extremity: Extensive atheromatous plaque. Elevated velocity within the right peroneal art
betsy measures 166 cm/s. Monophasic waveform within the dorsalis pedis artery. No occlusion.



Left lower extremity: Triphasic or biphasic waveforms throughout the left lower extremity. Mildly emy
vated velocity within the left common femoral artery measures 157 cm/s. Extensive atheromatous plaque
. No occlusion.



IMPRESSION:

1.  Extensive bilateral atheromatous plaque.

2.  Mildly elevated velocity within the right peroneal artery and left common femoral artery, may ind
icate 30-49 percent stenosis.



Electronically signed by: Johan Holloway DO (5/5/2021 5:00 PM) [id number]